# Patient Record
Sex: FEMALE | Race: WHITE | NOT HISPANIC OR LATINO | Employment: UNEMPLOYED | URBAN - METROPOLITAN AREA
[De-identification: names, ages, dates, MRNs, and addresses within clinical notes are randomized per-mention and may not be internally consistent; named-entity substitution may affect disease eponyms.]

---

## 2017-03-06 ENCOUNTER — TELEPHONE (OUTPATIENT)
Dept: NEUROLOGY | Facility: CLINIC | Age: 52
End: 2017-03-06

## 2017-03-06 DIAGNOSIS — R56.9 UNSPECIFIED CONVULSIONS (H): ICD-10-CM

## 2017-03-06 DIAGNOSIS — G40.109 LOCALIZATION-RELATED FOCAL EPILEPSY WITH SIMPLE PARTIAL SEIZURES (H): ICD-10-CM

## 2017-03-06 RX ORDER — LORAZEPAM 1 MG/1
1 TABLET ORAL 2 TIMES DAILY
Qty: 60 TABLET | Refills: 5 | Status: SHIPPED | OUTPATIENT
Start: 2017-03-06 | End: 2017-09-07

## 2017-03-06 RX ORDER — CARBAMAZEPINE 200 MG/1
CAPSULE, EXTENDED RELEASE ORAL
Qty: 240 CAPSULE | Refills: 5 | Status: SHIPPED | OUTPATIENT
Start: 2017-03-06 | End: 2017-06-07

## 2017-03-06 NOTE — TELEPHONE ENCOUNTER
Needs refills on   CARBATROL 200 MG 12 hr capsule 240 capsule 5 11/7/2016 Yes   Sig: Take 2 tabs (400mg) by mouth in the AM, 3 tabs (600mg) in the afternoon, and 3 tabs (600mg) in the PM.   Class: E-Prescribe     Disp Refills Start End PEYTON   LORazepam (ATIVAN) 1 MG tablet 60 tablet 5 11/7/2016 No   Sig: Take 1 tablet (1 mg) by mouth 2 times daily   Class: Local Print   Route: Oral   Order: [id number]     ALERT  Pt has u care insurance but [employer] drug coverage   Please send to the [facility] mail order  To see if covered   Pt is crying on the phone as she was told by the [employer] mail order she was not covered.    Could you help her with that?  Thanks.

## 2017-04-21 ENCOUNTER — OFFICE VISIT (OUTPATIENT)
Dept: NEUROLOGY | Facility: CLINIC | Age: 52
End: 2017-04-21

## 2017-04-21 VITALS
DIASTOLIC BLOOD PRESSURE: 98 MMHG | OXYGEN SATURATION: 98 % | RESPIRATION RATE: 20 BRPM | SYSTOLIC BLOOD PRESSURE: 154 MMHG | BODY MASS INDEX: 23.81 KG/M2 | HEART RATE: 96 BPM | WEIGHT: 142.9 LBS | HEIGHT: 65 IN

## 2017-04-21 DIAGNOSIS — G40.219 PARTIAL SYMPTOMATIC EPILEPSY WITH COMPLEX PARTIAL SEIZURES, INTRACTABLE, WITHOUT STATUS EPILEPTICUS (H): Primary | ICD-10-CM

## 2017-04-21 RX ORDER — DIPHENOXYLATE HYDROCHLORIDE AND ATROPINE SULFATE 2.5; .025 MG/1; MG/1
1 TABLET ORAL EVERY EVENING
COMMUNITY
Start: 2013-05-06 | End: 2017-06-30 | Stop reason: ALTCHOICE

## 2017-04-21 ASSESSMENT — PAIN SCALES - GENERAL: PAINLEVEL: NO PAIN (0)

## 2017-04-21 NOTE — MR AVS SNAPSHOT
After Visit Summary   4/21/2017    Mili Mane    MRN: [id number]           Patient Information     Date Of Birth          1965        Visit Information        Provider Department      4/21/2017 9:30 AM Barney Kendall MD [facility] Neurology        Today's Diagnoses     Partial symptomatic epilepsy with complex partial seizures, intractable, without status epilepticus (H)    -  1       Follow-ups after your visit        Additional Services     NEUROSURGERY REFERRAL       Your provider has referred you to: dr. SIERRA TO SE RE VNS BATTERY CHANGE  Please be aware that coverage of these services is subject to the terms and limitations of your health insurance plan.  Call member services at your health plan with any benefit or coverage questions.      Please bring the following with you to your appointment:    (1) Any X-Rays, CTs or MRIs which have been performed.  Contact the facility where they were done to arrange for  prior to your scheduled appointment.   (2) List of current medications  (3) This referral request   (4) Any documents/labs given to you for this referral                  Follow-up notes from your care team     Return in about 3 months (around 7/21/2017).      Who to contact     Please call your clinic at [phone] to:    Ask questions about your health    Make or cancel appointments    Discuss your medicines    Learn about your test results    Speak to your doctor   If you have compliments or concerns about an experience at your clinic, or if you wish to file a complaint, please contact [facility] Patient Relations at [phone] or email us at [email or web address]         Additional Information About Your Visit        MyChart Information     [employer] is an electronic gateway that provides easy, online access to your medical records. With [employer], you can request a clinic appointment, read your test results, renew a prescription or  "communicate with your care team.     To sign up for [employer] visit the website at [email or web address]   You will be asked to enter the access code listed below, as well as some personal information. Please follow the directions to create your username and password.     Your access code is: [email or web address]  Expires: 2017 10:38 AM     Your access code will  in 90 days. If you need help or a new code, please contact your [facility] or call [phone] for assistance.        Care EveryWhere ID     This is your Care EveryWhere ID. This could be used by other organizations to access your [facility] medical records  [id number]        Your Vitals Were     Pulse Respirations Height Pulse Oximetry Breastfeeding? BMI (Body Mass Index)    96 20 1.651 m (5' 5\") 98% No 23.78 kg/m2       Blood Pressure from Last 3 Encounters:   17 (!) 154/98   10/01/15 160/86   03/05/15 152/88    Weight from Last 3 Encounters:   17 64.8 kg (142 lb 14.4 oz)   10/01/15 66.2 kg (146 lb)   03/05/15 65.8 kg (145 lb)              We Performed the Following     NEUROSURGERY REFERRAL        Primary Care Provider Office Phone # Fax #    Prakash Rider [phone]       [facility] [address] [facility] [address]        Thank you!     Thank you for choosing [facility] NEUROLOGY  for your care. Our goal is always to provide you with excellent care. Hearing back from our patients is one way we can continue to improve our services. Please take a few minutes to complete the written survey that you may receive in the mail after your visit with us. Thank you!             Your Updated Medication List - Protect others around you: Learn how to safely use, store and throw away your medicines at www.disposemymeds.org.          This list is accurate as of: 17 10:13 AM.  Always use your most recent med list.                   Brand Name Dispense Instructions for use    " atenolol 25 MG tablet    TENORMIN    30 tablet    Take 0.5 tablets (12.5 mg) by mouth daily       CARBATROL 200 MG 12 hr capsule   Generic drug:  carBAMazepine     240 capsule    Take 2 tabs (400mg) by mouth in the AM, 3 tabs (600mg) in the afternoon, and 3 tabs (600mg) in the PM.       IBUPROFEN PO      Take 1-2 tablets by mouth as needed for moderate pain       LORazepam 1 MG tablet    ATIVAN    60 tablet    Take 1 tablet (1 mg) by mouth 2 times daily       MULTI-VITAMINS Tabs      Take 1 tablet by mouth daily

## 2017-04-21 NOTE — LETTER
2017       RE: Mili Mane  [address]  [facility] [address]     Dear Colleague,    Thank you for referring your patient, Mili Mane, to the [facility] NEUROLOGY at [facility]. Please see a copy of my visit note below.    Impedance  Ohms    2017       Prakash Rider MD    [address] [place] [address]       RE:    Mili Mane   MRN:  [id number]   :  1965      Dear Dr. Rider:      Ms. Mane is 51.  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  She has been on monotherapy with carbamazepine.  Recently you did a level for this and this was 11.  This has been similar before.  She is on the Carbatrol form at a schedule of 400 mg in the morning, 600 mg in the afternoon and 600 mg in the evening.        She is accompanied by her sister today as her mother has recently and suddenly passed away quickly from cancer.  She has been a little bit stressed out.  She has also noticed that she has had a little stronger seizures with some falling and she has had several per month.  In the past we tried multiple medications and this has been unsuccessful and she has developed a lot of side effects.  She has been seen at [facility].  She may have been on Topamax between those records.        Her sodiums have been borderline for a while, running 129 to 130, and this is undoubtedly related to the Carbatrol.  Her blood pressure is slightly elevated and we cautioned her to monitor that carefully with you.      On exam today, her pressures are 154/98, pulse is 96, respirations are 20 per minute.  Her eye movements do not show any nystagmus.  She is not dizzy.        In summary, the VNS battery is running out and is at very low levels.  She uses the magnet a fair amount of the time.  We would need to change her battery. It has been in only for 7 years, which is a little bit  unusual.  The usage of the magnet will be monitored in the future.      Her output current has been on the low side.  She has been able to tolerate more than 1.25 and she is cycling every 5 minutes, which should not burn the battery.      We need to rereview her drug, although she is not eager to do that and things may get fixed just with the VNS.  We will get [facility] records and see if she has had Topamax before.        We will follow her closely and ask her to see Neurosurgery for a battery change.      Sincerely,      Barney Kendall MD      D: 2017 10:39   T: 2017 11:12   MT: DAVIS      Name:     KELY JAMES   MRN:      [id number]        Account:      [id number]   :      1965           Service Date: 2017      Document: [id number]

## 2017-04-23 NOTE — PROGRESS NOTES
2017             Prakash Rider MD    [facility]   [address] [place] [address]       RE:    Mili Mane   MRN:  [id number]   :  1965      Dear Dr. Rider:      Ms. Mane is 51.  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  She has been on monotherapy with carbamazepine.  Recently you did a level for this and this was 11.  This has been similar before.  She is on the Carbatrol form at a schedule of 400 mg in the morning, 600 mg in the afternoon and 600 mg in the evening.        She is accompanied by her sister today as her mother has recently and suddenly passed away quickly from cancer.  She has been a little bit stressed out.  She has also noticed that she has had a little stronger seizures with some falling and she has had several per month.  In the past we tried multiple medications and this has been unsuccessful and she has developed a lot of side effects.  She has been seen at [facility].  She may have been on Topamax between those records.        Her sodiums have been borderline for a while, running 129 to 130, and this is undoubtedly related to the Carbatrol.  Her blood pressure is slightly elevated and we cautioned her to monitor that carefully with you.      On exam today, her pressures are 154/98, pulse is 96, respirations are 20 per minute.  Her eye movements do not show any nystagmus.  She is not dizzy.        In summary, the VNS battery is running out and is at very low levels.  She uses the magnet a fair amount of the time.  We would need to change her battery. It has been in only for 7 years, which is a little bit unusual.  The usage of the magnet will be monitored in the future.      Her output current has been on the low side.  She has been able to tolerate more than 1.25 and she is cycling every 5 minutes, which should not burn the battery.      We need to rereview her drug, although she is not eager to do that  and things may get fixed just with the VNS.  We will get [facility] records and see if she has had Topamax before.        We will follow her closely and ask her to see Neurosurgery for a battery change.      Sincerely,         MD ALDEN Carrera MD             D: 2017 10:39   T: 2017 11:12   MT: DAVIS      Name:     KELY JAMES   MRN:      [id number]        Account:      [id number]   :      1965           Service Date: 2017      Document: [id number]

## 2017-05-03 ENCOUNTER — TELEPHONE (OUTPATIENT)
Dept: NEUROLOGY | Facility: CLINIC | Age: 52
End: 2017-05-03

## 2017-05-03 NOTE — TELEPHONE ENCOUNTER
Spoke with patient who reports she is experiencing some mild chest and throat pain when it cycles through, does not experience pain every time is cycles but just this is occurring about once per day. The pain her throat began last night. Patient is also experiencing some nausea but unsure if this is correlated with her VNS cycling through. Per Dr. Kendall's last office visit:      In summary, the VNS battery is running out and is at very low levels. She uses the magnet a fair amount of the time. We would need to change her battery. It has been in only for 7 years, which is a little bit unusual. The usage of the magnet will be monitored in the future.       Her output current has been on the low side. She has been able to tolerate more than 1.25 and she is cycling every 5 minutes, which should not burn the battery.       We need to rereview her drug, although she is not eager to do that and things may get fixed just with the VNS. We will get [facility] records and see if she has had Topamax before.       We will follow her closely and ask her to see Neurosurgery for a battery change.       Plan:   RN explained how to turn generator off by holding magnet over it or taping it on her chest if the pain becomes hard to tolerate or worsen.   RN gave number to schedule appointment with Neurosurg to evaluate for battery change  RN explained that low battery could be cause for occasional pain, if pain worsens to call and schedule visit with RN to adjust settings or again encouraged to turn generator for periods of time if needed.     Patient verbalized agreement and understanding    No further needs at this time.

## 2017-05-03 NOTE — TELEPHONE ENCOUNTER
----- Message from Kajal Joshi LPN sent at 5/3/2017  3:59 PM CDT -----  Regarding: questions  Caller name: patient     Treating provider/specialty: Enoch    Nurse:    Best time to return call:    Message left? symptom - Vagus nerve stimulator is causing chest and throat pain 4/10, nausea since last week. Please address.    Description of issue/question:  Vagus nerve stimulator battery needs replacing-when is surgery date?

## 2017-05-12 DIAGNOSIS — Z96.89 STATUS POST VNS (VAGUS NERVE STIMULATOR) PLACEMENT: ICD-10-CM

## 2017-05-12 DIAGNOSIS — G40.219 PARTIAL SYMPTOMATIC EPILEPSY WITH COMPLEX PARTIAL SEIZURES, INTRACTABLE, WITHOUT STATUS EPILEPTICUS (H): Primary | ICD-10-CM

## 2017-05-16 ENCOUNTER — OFFICE VISIT (OUTPATIENT)
Dept: NEUROSURGERY | Facility: CLINIC | Age: 52
End: 2017-05-16

## 2017-05-16 VITALS
BODY MASS INDEX: 23.69 KG/M2 | WEIGHT: 142.2 LBS | HEIGHT: 65 IN | SYSTOLIC BLOOD PRESSURE: 162 MMHG | HEART RATE: 102 BPM | DIASTOLIC BLOOD PRESSURE: 86 MMHG

## 2017-05-16 DIAGNOSIS — Z87.898 H/O SHORTNESS OF BREATH: Primary | ICD-10-CM

## 2017-05-16 DIAGNOSIS — G40.211 PARTIAL SYMPTOMATIC EPILEPSY WITH COMPLEX PARTIAL SEIZURES, INTRACTABLE, WITH STATUS EPILEPTICUS (H): ICD-10-CM

## 2017-05-16 ASSESSMENT — PAIN SCALES - GENERAL: PAINLEVEL: NO PAIN (0)

## 2017-05-16 NOTE — LETTER
2017       RE: Mili James  [address]  [place] [address]     Dear Colleague,    Thank you for referring your patient, Mili James, to the [facility] NEUROSURGERY at [facility]. Please see a copy of my visit note below.    May 16, 2017      Barney Kendall M.D.      RE:  Mili Alhaji      Dear Barney:      I had the opportunity to see Ms. James today in my clinic.  As you know, she is a 52-year-old woman with intractable epilepsy.  She has had a VNS placed by Dr. Hdz in the early s.  She has the battery replaced by Dr. Retana here at the [place] more recently.  Since then she has been doing fairly well.  However, on interrogation, her battery is running low.  According to you, she does use her magnet a lot.      On examination, she has anterior axillary incision that appears well-healed.  She has a neck incision which is also well-healed.  The device is easily palpable and is extending anteriorly from her incision.        I spent approximately 30 minutes talking to her about the procedure.  She is quite versed on this given that she has had Dr. Retana operate on her before.  However, she does complain of some shortness of breath following her second operation.  For that reason, I think she will need to have good preoperative evaluation.  Once this is done, we will be glad to replace her VNS.  We will attempt to put in the newest battery with the most longevity for her.         IGOR SIERRA MD             D: 2017 15:17   T: 2017 07:36   MT: CARLO      Name:     MILI JAMES   MRN:      [id number]        Account:      [id number]   :      1965           Service Date: 2017      Document: [id number]

## 2017-05-16 NOTE — NURSING NOTE
Chief Complaint   Patient presents with     Consult     [facility] NEW - VNS Battery change     Marilynn Mckinley MA

## 2017-05-16 NOTE — MR AVS SNAPSHOT
After Visit Summary   5/16/2017    Mili Mane    MRN: [id number]           Patient Information     Date Of Birth          1965        Visit Information        Provider Department      5/16/2017 2:45 PM Eliseo Nolen MD [facility] Neurosurgery        Today's Diagnoses     H/O shortness of breath    -  1    Partial symptomatic epilepsy with complex partial seizures, intractable, with status epilepticus (H)           Follow-ups after your visit        Additional Services     PAC Visit Referral (For [facility] Only)       Does this visit require an Anesthesia consult?  Yes - Evaluate for medical necessity related to one of the following conditions:  H/o SOB postoperatively    H&P done by:  N/A-PAC to complete      Please be aware that coverage of these services is subject to the terms and limitations of your health insurance plan.  Call member services at your health plan with any benefit or coverage questions.      Please bring the following to your appointment:  >>   Any x-rays, CTs or MRIs which have been performed.  Contact the facility where they were done to arrange for  prior to your scheduled appointment.  Any new CT, MRI or other procedures ordered by your specialist must be performed at a [facility] facility or coordinated by your clinic's referral office.    >>   List of current medications  >>   This referral request   >>   Any documents/labs given to you for this referral                  Your next 10 appointments already scheduled     Jun 06, 2017 10:00 AM CDT   (Arrive by 9:45 AM)   PAC EVALUATION with Cinda Pac Jenny 7   [facility] ([facility] [place])    [address] [place] [address]  4th Floor  [facility] [address]   [phone]            Jun 06, 2017 11:00 AM CDT   (Arrive by 10:45 AM)   PAC RN ASSESSMENT with Cinda Pac Rn   [facility] ([facility] [place])    [address] [place] [address]  [facility] [address]   [phone]            2017 11:20 AM CDT   (Arrive by 11:05 AM)   PAC Anesthesia Consult with  Pac Anesthesiologist   [facility] ([facility])    [address] [place] [address]  4th [facility] [address] [phone]            2017   Procedure with Eliseo Nolen MD   [facility] [place] Same Day Surgery (--)    [address] [place] [address]   [phone]            2017 11:30 AM CDT   (Arrive by 11:15 AM)   Return Seizure with Barney Kendall MD   [facility] Neurology ([facility])    [address] [place] [address]  3rd [facility] [id number] [phone]              Who to contact     Please call your clinic at [phone] to:    Ask questions about your health    Make or cancel appointments    Discuss your medicines    Learn about your test results    Speak to your doctor   If you have compliments or concerns about an experience at your clinic, or if you wish to file a complaint, please contact [facility] Patient Relations at [phone] or email us at [email or web address]         Additional Information About Your Visit        [employer] Information     [employer] is an electronic gateway that provides easy, online access to your medical records. With [employer], you can request a clinic appointment, read your test results, renew a prescription or communicate with your care team.     To sign up for [employer] visit the website at [email or web address]   You will be asked to enter the access code listed below, as well as some personal information. Please follow the directions to create your username and password.     Your access code is: [email or web address]  Expires: 2017 10:38 AM     Your access code will  in 90 days. If you need help or a new code, please contact your [facility] or call  "[phone] for assistance.        Care EveryWhere ID     This is your Care EveryWhere ID. This could be used by other organizations to access your [facility] medical records  [id number]        Your Vitals Were     Pulse Height BMI (Body Mass Index)             102 1.651 m (5' 5\") 23.66 kg/m2          Blood Pressure from Last 3 Encounters:   05/16/17 162/86   04/21/17 (!) 154/98   10/01/15 160/86    Weight from Last 3 Encounters:   05/16/17 64.5 kg (142 lb 3.2 oz)   04/21/17 64.8 kg (142 lb 14.4 oz)   10/01/15 66.2 kg (146 lb)              We Performed the Following     PAC Visit Referral (For [facility] Only)     Lila-Operative Worksheet        Primary Care Provider Office Phone # Fax #    Prakash Rider [phone]       [address] [facility] [address]        Thank you!     Thank you for choosing [facility] NEUROSURGERY  for your care. Our goal is always to provide you with excellent care. Hearing back from our patients is one way we can continue to improve our services. Please take a few minutes to complete the written survey that you may receive in the mail after your visit with us. Thank you!             Your Updated Medication List - Protect others around you: Learn how to safely use, store and throw away your medicines at www.disposemymeds.org.          This list is accurate as of: 5/16/17 11:59 PM.  Always use your most recent med list.                   Brand Name Dispense Instructions for use    atenolol 25 MG tablet    TENORMIN    30 tablet    Take 0.5 tablets (12.5 mg) by mouth daily       CARBATROL 200 MG 12 hr capsule   Generic drug:  carBAMazepine     240 capsule    Take 2 tabs (400mg) by mouth in the AM, 3 tabs (600mg) in the afternoon, and 3 tabs (600mg) in the PM.       IBUPROFEN PO      Take 1-2 tablets by mouth as needed for moderate pain       LORazepam 1 MG tablet    ATIVAN    60 tablet    Take 1 tablet (1 mg) by mouth 2 times daily       " MULTI-VITAMINS Tabs      Take 1 tablet by mouth daily

## 2017-05-17 NOTE — PROGRESS NOTES
May 16, 2017      Barney Kendall M.D.      RE:  Mili James      Dear Barney:      I had the opportunity to see Ms. James today in my clinic.  As you know, she is a 52-year-old woman with intractable epilepsy.  She has had a VNS placed by Dr. Hdz in the early s.  She has the battery replaced by Dr. Retana here at the [facility] more recently.  Since then she has been doing fairly well.  However, on interrogation, her battery is running low.  According to you, she does use her magnet a lot.      On examination, she has anterior axillary incision that appears well-healed.  She has a neck incision which is also well-healed.  The device is easily palpable and is extending anteriorly from her incision.        I spent approximately 30 minutes talking to her about the procedure.  She is quite versed on this given that she has had Dr. Retana operate on her before.  However, she does complain of some shortness of breath following her second operation.  For that reason, I think she will need to have good preoperative evaluation.  Once this is done, we will be glad to replace her VNS.  We will attempt to put in the newest battery with the most longevity for her.         IGOR SIERRA MD             D: 2017 15:17   T: 2017 07:36   MT: CARLO      Name:     MILI JAMES   MRN:      [id number]        Account:      [id number]   :      1965           Service Date: 2017      Document: [id number]

## 2017-05-18 NOTE — NURSING NOTE
Pre-op Teaching                Pre-op folder with specific written instructions was provided to Mili.     Discussed pre-op routine and requirements to include:  surgical procedure, post-op recovery and expectations, need for H&P, NPO prior to OR, pre-op antibacterial showers, pain control and importance of follow-up visits.  Surgery scheduling will coordinate OR time/date and update patient as appropriate.  Pre-op will call with more instructions 24-48 hour pre-op.   Ample time was provided for questions and in-depth discussion of topics of heightened interest.  A four ounce bottle of antibacterial soap solution was given to patient as well as specific instructions for use. Mili and  verbalized understanding of instructions.

## 2017-06-06 ENCOUNTER — ALLIED HEALTH/NURSE VISIT (OUTPATIENT)
Dept: SURGERY | Facility: CLINIC | Age: 52
End: 2017-06-06

## 2017-06-06 ENCOUNTER — ANESTHESIA EVENT (OUTPATIENT)
Dept: SURGERY | Facility: CLINIC | Age: 52
End: 2017-06-06
Payer: COMMERCIAL

## 2017-06-06 ENCOUNTER — OFFICE VISIT (OUTPATIENT)
Dept: SURGERY | Facility: CLINIC | Age: 52
End: 2017-06-06

## 2017-06-06 VITALS
OXYGEN SATURATION: 100 % | DIASTOLIC BLOOD PRESSURE: 108 MMHG | RESPIRATION RATE: 16 BRPM | TEMPERATURE: 97.5 F | HEIGHT: 65 IN | SYSTOLIC BLOOD PRESSURE: 160 MMHG | BODY MASS INDEX: 23.09 KG/M2 | HEART RATE: 86 BPM | WEIGHT: 138.6 LBS

## 2017-06-06 DIAGNOSIS — Z01.818 PREOP GENERAL PHYSICAL EXAM: ICD-10-CM

## 2017-06-06 DIAGNOSIS — Z01.818 PREOP GENERAL PHYSICAL EXAM: Primary | ICD-10-CM

## 2017-06-06 LAB
ANION GAP SERPL CALCULATED.3IONS-SCNC: 6 MMOL/L (ref 3–14)
BUN SERPL-MCNC: 10 MG/DL (ref 7–30)
CALCIUM SERPL-MCNC: 8.7 MG/DL (ref 8.5–10.1)
CHLORIDE SERPL-SCNC: 99 MMOL/L (ref 94–109)
CO2 SERPL-SCNC: 27 MMOL/L (ref 20–32)
CREAT SERPL-MCNC: 0.51 MG/DL (ref 0.52–1.04)
ERYTHROCYTE [DISTWIDTH] IN BLOOD BY AUTOMATED COUNT: 12.6 % (ref 10–15)
GFR SERPL CREATININE-BSD FRML MDRD: ABNORMAL ML/MIN/1.7M2
GLUCOSE SERPL-MCNC: 99 MG/DL (ref 70–99)
HCT VFR BLD AUTO: 37.5 % (ref 35–47)
HGB BLD-MCNC: 12.5 G/DL (ref 11.7–15.7)
MCH RBC QN AUTO: 30 PG (ref 26.5–33)
MCHC RBC AUTO-ENTMCNC: 33.3 G/DL (ref 31.5–36.5)
MCV RBC AUTO: 90 FL (ref 78–100)
PLATELET # BLD AUTO: 329 10E9/L (ref 150–450)
POTASSIUM SERPL-SCNC: 4.7 MMOL/L (ref 3.4–5.3)
RBC # BLD AUTO: 4.17 10E12/L (ref 3.8–5.2)
SODIUM SERPL-SCNC: 133 MMOL/L (ref 133–144)
WBC # BLD AUTO: 4.7 10E9/L (ref 4–11)

## 2017-06-06 ASSESSMENT — ENCOUNTER SYMPTOMS: SEIZURES: 1

## 2017-06-06 NOTE — PATIENT INSTRUCTIONS
Preparing for Your Surgery      Name:  Mili Mane   MRN:  [id number]   :  1965   Today's Date:  2017     Arriving for surgery:  Surgery date:  17  Surgery time:  8:15 am  Arrival time:  6:15 am  Please come to:       [facility] [address]  [address] [place]  [address]    -   parking is available in front of the hospital from 5:15 am to 8:00 pm    -  Stop at the Information Desk in the lobby    -   Inform the information person that you are here for surgery. An escort to 3c will be provided. If you would not like an escort, please proceed to 3C on the 3rd floor. [phone]     What can I eat or drink?  -  You may have solid food or milk products until 8 hours prior to your surgery. (12 midnight)  -  You may have water, apple juice or 7up/Sprite until 2 hours prior to your surgery. (6:15 am)    Which medicines can I take?  -  Do NOT take vitamins for one week before surgery.  Hold Ibuprofen for 2-3 days before surgery.  -  Please take these medications the day of surgery: Atenolol, Carbatrol  -  OK to take Ativan if needed    How do I prepare myself?  -  Take two showers: one the night before surgery; and one the morning of surgery.         Use Scrubcare or Hibiclens to wash from neck down.  You may use your own shampoo and conditioner. No other hair products.   -  Do NOT use lotion, powder, deodorant, or antiperspirant the day of your surgery.  -  Do NOT wear any makeup, fingernail polish or jewelry.  -  Do not bring your own medications to the hospital, except for inhalers and eye drops.  -  Bring your ID and insurance card.    Questions or Concerns:  If you have questions or concerns, please call the  Preoperative Assessment Center, Monday-Friday 7AM-7PM:  [phone]            AFTER YOUR SURGERY  Breathing exercises   Breathing exercises help you recover faster. Take deep breaths and let the air out slowly. This will:     Help you wake  up after surgery.    Help prevent complications like pneumonia.  Preventing complications will help you go home sooner.   We may give you a breathing device (incentive spirometer) to encourage you to breathe deeply.   Nausea and vomiting   You may feel sick to your stomach after surgery; if so, let your nurse know.    Pain control:  After surgery, you may have pain. Our goal is to help you manage your pain. Pain medicine will help you feel comfortable enough to do activities that will help you heal.  These activities may include breathing exercises, walking and physical therapy.   To help your health care team treat your pain we will ask: 1) If you have pain  2) where it is located 3) describe your pain in your words  Methods of pain control include medications given by mouth, vein or by nerve block for some surgeries.  We may give you a pain control pump that will:  1) Deliver the medicine through a tube placed in your vein  2) Control the amount of medicine you receive  3) Allow you to push a button to deliver a dose of pain medicine  Sequential Compression Device (SCD) or Pneumo Boots:  You may need to wear SCD S on your legs or feet. These are wraps connected to a machine that pumps in air and releases it. The repeated pumping helps prevent blood clots from forming.

## 2017-06-06 NOTE — MR AVS SNAPSHOT
After Visit Summary   2017    Mili Mane    MRN: [id number]           Patient Information     Date Of Birth          1965        Visit Information        Provider Department      2017 11:00 AM Rn, [facility]        Care Instructions    Preparing for Your Surgery      Name:  Mili Mane   MRN:  [id number]   :  1965   Today's Date:  2017     Arriving for surgery:  Surgery date:  17  Surgery time:  8:15 am  Arrival time:  6:15 am  Please come to:       [facility] [address]  [address] [place]  [address]    -   parking is available in front of the hospital from 5:15 am to 8:00 pm    -  Stop at the Information Desk in the lobby    -   Inform the information person that you are here for surgery. An escort to 3c will be provided. If you would not like an escort, please proceed to 3C on the 3rd floor. [phone]     What can I eat or drink?  -  You may have solid food or milk products until 8 hours prior to your surgery. (12 midnight)  -  You may have water, apple juice or 7up/Sprite until 2 hours prior to your surgery. (6:15 am)    Which medicines can I take?  -  Do NOT take vitamins for one week before surgery.  Hold Ibuprofen for 2-3 days before surgery.  -  Please take these medications the day of surgery: Atenolol, Carbatrol  -  OK to take Ativan if needed    How do I prepare myself?  -  Take two showers: one the night before surgery; and one the morning of surgery.         Use Scrubcare or Hibiclens to wash from neck down.  You may use your own shampoo and conditioner. No other hair products.   -  Do NOT use lotion, powder, deodorant, or antiperspirant the day of your surgery.  -  Do NOT wear any makeup, fingernail polish or jewelry.  -  Do not bring your own medications to the hospital, except for inhalers and eye drops.  -  Bring your ID and insurance card.    Questions  or Concerns:  If you have questions or concerns, please call the  Preoperative Assessment Center, Monday-Friday 7AM-7PM:  [phone]            AFTER YOUR SURGERY  Breathing exercises   Breathing exercises help you recover faster. Take deep breaths and let the air out slowly. This will:     Help you wake up after surgery.    Help prevent complications like pneumonia.  Preventing complications will help you go home sooner.   We may give you a breathing device (incentive spirometer) to encourage you to breathe deeply.   Nausea and vomiting   You may feel sick to your stomach after surgery; if so, let your nurse know.    Pain control:  After surgery, you may have pain. Our goal is to help you manage your pain. Pain medicine will help you feel comfortable enough to do activities that will help you heal.  These activities may include breathing exercises, walking and physical therapy.   To help your health care team treat your pain we will ask: 1) If you have pain  2) where it is located 3) describe your pain in your words  Methods of pain control include medications given by mouth, vein or by nerve block for some surgeries.  We may give you a pain control pump that will:  1) Deliver the medicine through a tube placed in your vein  2) Control the amount of medicine you receive  3) Allow you to push a button to deliver a dose of pain medicine  Sequential Compression Device (SCD) or Pneumo Boots:  You may need to wear SCD S on your legs or feet. These are wraps connected to a machine that pumps in air and releases it. The repeated pumping helps prevent blood clots from forming.           Follow-ups after your visit        Your next 10 appointments already scheduled     Jun 06, 2017 11:00 AM CDT   (Arrive by 10:45 AM)   PAC RN ASSESSMENT with Cinda Pac Rn   [facility] ([facility])    [address]  [address] [facility] [address] [phone]            Jun  06, 2017 11:20 AM CDT   (Arrive by 11:05 AM)   PAC Anesthesia Consult with  Pac Anesthesiologist   [facility] Preoperative [facility] ([facility])    [address]  [address] [facility] [id number] [phone]            Jun 06, 2017 11:30 AM CDT   LAB with  LAB   [facility] Lab ([facility])    [address] [place] [address]  [address] [facility] [id number] [phone]           Patient must bring picture ID.  Patient should be prepared to give a urine specimen  Please do not eat 10-12 hours before your appointment if you are coming in fasting for labs on lipids, cholesterol, or glucose (sugar).  Pregnant women should follow their Care Team instructions. Water with medications is okay. Do not drink coffee or other fluids.   If you have concerns about taking  your medications, please ask at office or if scheduling via [employer], send a message by clicking on Secure Messaging, Message Your Care Team.            Jun 20, 2017   Procedure with Eliseo Nolen MD   [facility] [place], Same Day Surgery (--)    [address] [id number] [phone]            Jul 07, 2017 11:30 AM CDT   (Arrive by 11:15 AM)   Return Seizure with Barney Kendall MD   [facility] Neurology ([facility])    [address] [place] [address]  [address] [facility] [id number] [phone]              Who to contact     Please call your clinic at [phone] to:    Ask questions about your health    Make or cancel appointments    Discuss your medicines    Learn about your test results    Speak to your doctor   If you have compliments or concerns about an experience at your clinic, or if you wish to file a complaint, please contact [facility] Patient Relations at [phone] or email us at [email or web address]         Additional Information About Your Visit        nxtControlhart Information     [employer] is an electronic  gateway that provides easy, online access to your medical records. With [employer], you can request a clinic appointment, read your test results, renew a prescription or communicate with your care team.     To sign up for [employer] visit the website at [email or web address]   You will be asked to enter the access code listed below, as well as some personal information. Please follow the directions to create your username and password.     Your access code is: [email or web address]  Expires: 2017 10:38 AM     Your access code will  in 90 days. If you need help or a new code, please contact your [facility] or call [phone] for assistance.        Care EveryWhere ID     This is your Care EveryWhere ID. This could be used by other organizations to access your [facility] medical records  [id number]         Blood Pressure from Last 3 Encounters:   17 (!) 160/108   17 162/86   17 (!) 154/98    Weight from Last 3 Encounters:   17 62.9 kg (138 lb 9.6 oz)   17 64.5 kg (142 lb 3.2 oz)   17 64.8 kg (142 lb 14.4 oz)              Today, you had the following     No orders found for display         Today's Medication Changes          These changes are accurate as of: 17 10:26 AM.  If you have any questions, ask your nurse or doctor.               These medicines have changed or have updated prescriptions.        Dose/Directions    atenolol 25 MG tablet   Commonly known as:  TENORMIN   This may have changed:  when to take this   Used for:  Essential hypertension, benign, Partial symptomatic epilepsy with complex partial seizures, not intractable, without status epilepticus (H)        Dose:  12.5 mg   Take 0.5 tablets (12.5 mg) by mouth daily   Quantity:  30 tablet   Refills:  0                Primary Care Provider Office Phone # Fax #    Prakash Rider [phone]       [facility] [address] [address]         Thank you!     Thank you for choosing [facility]  for your care. Our goal is always to provide you with excellent care. Hearing back from our patients is one way we can continue to improve our services. Please take a few minutes to complete the written survey that you may receive in the mail after your visit with us. Thank you!             Your Updated Medication List - Protect others around you: Learn how to safely use, store and throw away your medicines at www.disposemymeds.org.          This list is accurate as of: 6/6/17 10:26 AM.  Always use your most recent med list.                   Brand Name Dispense Instructions for use    atenolol 25 MG tablet    TENORMIN    30 tablet    Take 0.5 tablets (12.5 mg) by mouth daily       CARBATROL 200 MG 12 hr capsule   Generic drug:  carBAMazepine     240 capsule    Take 2 tabs (400mg) by mouth in the AM, 3 tabs (600mg) in the afternoon, and 3 tabs (600mg) in the PM.       IBUPROFEN PO      Take 1-2 tablets by mouth as needed for moderate pain       LORazepam 1 MG tablet    ATIVAN    60 tablet    Take 1 tablet (1 mg) by mouth 2 times daily       MULTI-VITAMINS Tabs      Take 1 tablet by mouth every evening

## 2017-06-06 NOTE — H&P
Pre-Operative H & P     CC:  Preoperative exam to assess for increased cardiopulmonary risk while undergoing surgery and anesthesia.    Date of Encounter: 6/6/2017  Primary Care Physician:  Prakash Rider TONYA Mane is a 52 year old female who presents for pre-operative H & P in preparation for Vagus Nerve Stimulator Replacement with Dr. Nolen on 6/20/2017 at [facility].     History is obtained from the patient.     Past Medical History  Past Medical History:   Diagnosis Date     Adjustment disorder with depressed mood      Epileptic seizure (H)      HTN (hypertension)        Past Surgical History  Past Surgical History:   Procedure Laterality Date     vnp         Hx of Blood transfusions/reactions: none    Hx of abnormal bleeding or anti-platelet use: none    Menstrual history: Patient's last menstrual period was 05/06/2017 (exact date).:     Steroid use in the last year: none    Personal or FH with difficulty with Anesthesia:  None        Prior to Admission Medications  Current Outpatient Prescriptions   Medication Sig Dispense Refill     Multiple Vitamin (MULTI-VITAMINS) TABS Take 1 tablet by mouth every evening        LORazepam (ATIVAN) 1 MG tablet Take 1 tablet (1 mg) by mouth 2 times daily 60 tablet 5     atenolol (TENORMIN) 25 MG tablet Take 1 tablet (25 mg) by mouth every morning       CARBATROL 200 MG 12 hr capsule Take 2 tabs (400mg) by mouth in the AM, 3 tabs (600mg) in the afternoon, and 3 tabs (600mg) in the PM. 240 capsule 1     IBUPROFEN PO Take 1-2 tablets by mouth as needed for moderate pain         Allergies  Allergies   Allergen Reactions     Fish Allergy      Nuts      Other [Seasonal Allergies]      Seizure medication     Penicillins      Pollen Extract/Tree Extract        Social History  Social History     Social History     Marital status: Single     Spouse name: N/A     Number of children: N/A     Years of education: N/A      Occupational History     Not on file.     Social History Main Topics     Smoking status: Never Smoker     Smokeless tobacco: Never Used     Alcohol use No     Drug use: No     Sexual activity: Not on file     Other Topics Concern      Service No     Blood Transfusions No     Caffeine Concern Yes     Occupational Exposure No     Hobby Hazards No     Weight Concern No     Special Diet No     Back Care No     Exercise Yes     inconsistent.  Walks her dog occasionally     Seat Belt Yes     Self-Exams No     Social History Narrative    Lives with her parents.        Family History  Family History   Problem Relation Age of Onset     Hypertension Mother      Lipids Mother      Cardiovascular Maternal Uncle      Cardiovascular Maternal Uncle      Cardiovascular Paternal Grandfather      CANCER Maternal Grandmother      gastric     Asthma Sister              Anesthesia Evaluation     . Pt has had prior anesthetic. Type: General and MAC           ROS/MED HX    ENT/Pulmonary:  - neg pulmonary ROS     Neurologic:     (+)seizures last seizure: 6/1/2017 features: weekly seizures, mostly absence seizures,     Cardiovascular:     (+) hypertension----. : . . . :. . Previous cardiac testing Echodate:results:date: results:ECG reviewed date:6/6/2017 results: date: results:          METS/Exercise Tolerance:  3 - Able to walk 1-2 blocks without stopping   Hematologic:  - neg hematologic  ROS       Musculoskeletal:  - neg musculoskeletal ROS       GI/Hepatic:  - neg GI/hepatic ROS       Renal/Genitourinary:  - ROS Renal section negative       Endo:  - neg endo ROS       Psychiatric:     (+) psychiatric history anxiety      Infectious Disease:  - neg infectious disease ROS       Malignancy:      - no malignancy   Other:    (+) No chance of pregnancy C-spine cleared: N/A, no H/O Chronic Pain,no other significant disability   - neg other ROS           Physical Exam  Normal systems: cardiovascular, pulmonary and dental    Airway  "  Mallampati: II  Neck ROM: full    Dental   Normal    Cardiovascular   Rhythm and rate: regular and normal  Positive murmur    Pulmonary    breath sounds clear to auscultation            The complete review of systems is negative other than noted in the HPI or here.                        138 lbs 9.6 oz  5' 5\"   Body mass index is 23.06 kg/(m^2).       Physical Exam  Constitutional: Awake, alert, cooperative, no apparent distress, and appears stated age.  Eyes: Pupils equal, round and reactive to light, extra ocular muscles intact, sclera clear, conjunctiva normal.  HENT: Normocephalic, oral pharynx with moist mucus membranes, good dentition. No goiter appreciated.   Respiratory: Clear to auscultation bilaterally, no crackles or wheezing.  Cardiovascular: Regular rate and rhythm, normal S1 and S2, and no murmur noted.  Carotids +2, no bruits. No edema. Palpable pulses to radial  DP and PT arteries.   GI: Normal bowel sounds, soft, non-distended, non-tender, no masses palpated, no hepatosplenomegaly.    Lymph/Hematologic: No cervical lymphadenopathy and no supraclavicular lymphadenopathy.  Genitourinary:  deferred  Skin: Warm and dry.  No rashes at anticipated surgical site.   Musculoskeletal: Full ROM of neck. There is no redness, warmth, or swelling of the joints. Gross motor strength is normal.    Neurologic: Awake, alert, oriented to name, place and time. Cranial nerves II-XII are grossly intact. Gait is normal.   Neuropsychiatric: Calm, cooperative. Normal affect.     Labs: (personally reviewed)  Lab Results   Component Value Date    WBC 4.7 06/06/2017     Lab Results   Component Value Date    RBC 4.17 06/06/2017     Lab Results   Component Value Date    HGB 12.5 06/06/2017     Lab Results   Component Value Date    HCT 37.5 06/06/2017     No components found for: MCT  Lab Results   Component Value Date    MCV 90 06/06/2017     Lab Results   Component Value Date    MCH 30.0 06/06/2017     Lab Results   Component " Value Date    MCHC 33.3 2017     Lab Results   Component Value Date    RDW 12.6 2017     Lab Results   Component Value Date     2017       Last Basic Metabolic Panel:  Lab Results   Component Value Date     2017      Lab Results   Component Value Date    POTASSIUM 4.7 2017     Lab Results   Component Value Date    CHLORIDE 99 2017     Lab Results   Component Value Date    RANDY 8.7 2017     Lab Results   Component Value Date    CO2 27 2017     Lab Results   Component Value Date    BUN 10 2017     Lab Results   Component Value Date    CR 0.51 2017     Lab Results   Component Value Date    GLC 99 2017       EKG: Personally reviewed but formal cardiology read pendin2017 NSR, possible left atrial enlargement, left axis deviation and LBBB      Cardiac echo: 2014  Final Conclusion   Normal left ventricular size.Left ventricular ejection fraction is estimated at 40-45%.   Mildly hypokinetic anteroseptum and septum.   Normal left ventricular wall thickness.   Aortic valve sclerosis.Mild aortic regurgitation.   Mitral annular calcification.Mild-to-moderate mitral regurgitation.   Mild tricuspid regurgitation.   Estimated EF: 40-45%     FINDINGS   Left Ventricle Normal left ventricular size. Left ventricular ejection fraction is estimated   at 40-45%. Mildly hypokinetic   anteroseptum and septum. Normal left ventricular wall thickness.   LV Diastology Normal left ventricular diastolic filling pattern for age.   Right Ventricle The right ventricle is normal in size and function.   Left Atrium The left atrium is normal in size.   Right Atrium The right atrium is normal in size.   IA Septum: No left to right shunt was detected by limited color flow Doppler interrogation of   the interatrial septum.   IVC Normal inferior vena cava (IVC) size.   Aortic Valve Aortic valve sclerosis. Mild aortic regurgitation.   Mitral Valve Mitral annular  calcification. Mild-to-moderate mitral regurgitation.   Tricuspid Valve Mild tricuspid regurgitation.mild tricuspid regurgitation. Cannot estimate PA   pressures on this study.   Pulmonic Valve Structurally normal pulmonic valve without significant stenosis.  There is no   significant pulmonic regurgitation.   Aorta The sinuses of Valsalva, sinotubular junction, and ascending aorta appear normal.   Pericardium Normal pericardium without effusion.    Echocardiogram 6/12/2017    Interpretation Summary  Left ventricular size is normal.  The Ejection Fraction is estimated at 30-35%.  Right ventricular function, chamber size, wall motion, and thickness are  normal.  The inferior vena cava is normal.  No pericardial effusion is present.  _____________________________________________________________________________  __        Left Ventricle  Left ventricular size is normal. Left ventricular wall thickness is normal.  The Ejection Fraction is estimated at 30-35%. Normal left ventricular filling  for age. Septal wall akinesis is present.     Right Ventricle  Right ventricular function, chamber size, wall motion, and thickness are  normal.     Atria  Both atria appear normal. The atrial septum is intact as assessed by color  Doppler .     Mitral Valve  The mitral valve is normal. Mild mitral insufficiency is present.        Aortic Valve  The aortic valve is tricuspid. Trace aortic insufficiency is present.     Tricuspid Valve  The tricuspid valve is normal. Trace to mild tricuspid insufficiency is  present. The right ventricular systolic pressure is approximated at 22.0 mmHg  plus the right atrial pressure.     Pulmonic Valve  The pulmonic valve is normal. Trace pulmonic insufficiency is present.     Vessels  The aorta root is normal. The pulmonary artery is normal. The inferior vena  cava is normal.     Pericardium  No pericardial effusion is  present.     _____________________________________________________________________________  __  MMode/2D Measurements & Calculations  IVSd: 0.81 cm  LVIDd: 4.9 cm  LVIDs: 3.8 cm  LVPWd: 0.79 cm  FS: 23.2 %  EDV(Teich): 114.1 ml  ESV(Teich): 61.3 ml     LV mass(C)d: 131.9 grams  LV mass(C)dI: 78.1 grams/m2  Ao root diam: 2.6 cm  asc Aorta Diam: 2.6 cm  LVOT diam: 1.9 cm  LVOT area: 2.9 cm2  LA Volume (BP): 53.7 ml  LA Volume Index (BP): 31.8 ml/m2        Doppler Measurements & Calculations  MV E max helen: 103.3 cm/sec  MV A max helen: 55.7 cm/sec  MV E/A: 1.9  MV dec time: 0.17 sec     Ao V2 max: 132.9 cm/sec  Ao max P.0 mmHg  TR max helen: 234.6 cm/sec  TR max P.0 mmHg  Lateral E/e': 11.4  Medial E/e': 12.8        ASSESSMENT and PLAN  Mili Mane is a 52 year old female scheduled to undergo Vagus Nerve Stimulator Replacement. She has the following specific operative considerations:   - RCRI : Unknown cause for low EF, currently being worked up by cardiology  - Anesthesia considerations:  Refer to PAC assessment in anesthesia records  - VTE risk: low risk  - YOON # of risks  = low risk  - Post-op delirium risk: low risk  - Risk of PONV score = 2.  If > 2, anti-emetic intervention recommended.      Previous anesthesia without complications.  1) Cardiac: Echo done in  at [facility] shows EF 40-45% and mild hypokinesis, no follow-up. EKG today NSR, possible left atrial enlargement, left axis deviation and LBBB. Pt referred to cardiology and is scheduled 6/10/2017. HTN, on atenolol at 12.5 mg with elevated B/Ps. Encouraged to F/U with PCP for better control.  2) Pulmonary: Never smoked. Notes intermittent shortness of breath.  3) Neuro: seizure disorder diagnosed at 18 months. Current regimen of carbatrol and VNS. Interventions have helped, but pt still experiences seizures about once a week.      Independent with all activity, but limited to home due to seizure frequency    PT SEEN BY CARDIOLOGY, due to  "reduced EF and cardiomyopathy. Discussed with Dr. Nolen and surgery pending clearance by Dr. Calvert.    Patient was discussed with Dr Germain.    MALCOLM Diggs CNS  Preoperative Assessment Center  [facility]  [facility]  Phone: [phone]  Fax: [phone]    Mili Mane is a 52 year old female patient born on 1965.  1. Preop general physical exam      Past Medical History:   Diagnosis Date     Adjustment disorder with depressed mood      Epileptic seizure (H)      HTN (hypertension)      Allergies   Allergen Reactions     Fish Allergy      Nuts      Other [Seasonal Allergies]      Seizure medication     Penicillins      Pollen Extract/Tree Extract      Active Problems:    * No active hospital problems. *    Blood pressure (!) 160/108, pulse 86, temperature 97.5  F (36.4  C), temperature source Oral, resp. rate 16, height 1.651 m (5' 5\"), weight 62.9 kg (138 lb 9.6 oz), last menstrual period 05/06/2017, SpO2 100 %, not currently breastfeeding.    NICU Admission  Maternal Medical History  Assessment & Plan    Uriel Barron  6/19/2017    "

## 2017-06-06 NOTE — ANESTHESIA PREPROCEDURE EVALUATION
Anesthesia Evaluation     . Pt has had prior anesthetic. Type: General and MAC           ROS/MED HX    ENT/Pulmonary:  - neg pulmonary ROS     Neurologic:     (+)seizures last seizure: 6/1/2017 features: weekly seizures, mostly absence seizures,     Cardiovascular:     (+) hypertension----. : . CHF . PRINCE, . :. . Previous cardiac testing Echodate:results:EF 30-35%, MR, ARdate: results:ECG reviewed date:6/6/2017 results:NSR, LBBB, left axis deviation date: results:          METS/Exercise Tolerance:  3 - Able to walk 1-2 blocks without stopping   Hematologic:  - neg hematologic  ROS       Musculoskeletal:  - neg musculoskeletal ROS       GI/Hepatic:  - neg GI/hepatic ROS       Renal/Genitourinary:  - ROS Renal section negative       Endo:  - neg endo ROS       Psychiatric:     (+) psychiatric history anxiety      Infectious Disease:  - neg infectious disease ROS       Malignancy:      - no malignancy   Other:    (+) No chance of pregnancy C-spine cleared: N/A, no H/O Chronic Pain,no other significant disability   - neg other ROS                 Physical Exam  Normal systems: cardiovascular, pulmonary and dental    Airway   Mallampati: II  Neck ROM: full    Dental     Cardiovascular   Rhythm and rate: regular and normal  (+) murmur       Pulmonary    breath sounds clear to auscultation    Other findings:   Lab Results      Component                Value               Date                      WBC                      4.7                 06/06/2017            Lab Results      Component                Value               Date                      RBC                      4.17                06/06/2017            Lab Results      Component                Value               Date                      HGB                      12.5                06/06/2017            Lab Results      Component                Value               Date                      HCT                      37.5                06/06/2017            No  components found for: MCT  Lab Results      Component                Value               Date                      MCV                      90                  06/06/2017            Lab Results      Component                Value               Date                      MCH                      30.0                06/06/2017            Lab Results      Component                Value               Date                      MCHC                     33.3                06/06/2017            Lab Results      Component                Value               Date                      RDW                      12.6                06/06/2017            Lab Results      Component                Value               Date                      PLT                      329                 06/06/2017              Last Basic Metabolic Panel:  Lab Results      Component                Value               Date                      NA                       133                 06/06/2017             Lab Results      Component                Value               Date                      POTASSIUM                4.7                 06/06/2017            Lab Results      Component                Value               Date                      CHLORIDE                 99                  06/06/2017            Lab Results      Component                Value               Date                      RANDY                      8.7                 06/06/2017            Lab Results      Component                Value               Date                      CO2                      27                  06/06/2017            Lab Results      Component                Value               Date                      BUN                      10                  06/06/2017            Lab Results      Component                Value               Date                      CR                       0.51                06/06/2017            Lab Results      Component                 Value               Date                      GLC                      99                  06/06/2017              Interpretation Summary  Left ventricular size is normal.  The Ejection Fraction is estimated at 30-35%.  Right ventricular function, chamber size, wall motion, and thickness are  normal.  The inferior vena cava is normal.  No pericardial effusion is present.  _____________________________________________________________________________  __        Left Ventricle  Left ventricular size is normal. Left ventricular wall thickness is normal.  The Ejection Fraction is estimated at 30-35%. Normal left ventricular filling  for age. Septal wall akinesis is present.     Right Ventricle  Right ventricular function, chamber size, wall motion, and thickness are  normal.     Atria  Both atria appear normal. The atrial septum is intact as assessed by color  Doppler .     Mitral Valve  The mitral valve is normal. Mild mitral insufficiency is present.        Aortic Valve  The aortic valve is tricuspid. Trace aortic insufficiency is present.     Tricuspid Valve  The tricuspid valve is normal. Trace to mild tricuspid insufficiency is  present. The right ventricular systolic pressure is approximated at 22.0 mmHg  plus the right atrial pressure.     Pulmonic Valve  The pulmonic valve is normal. Trace pulmonic insufficiency is present.     Vessels  The aorta root is normal. The pulmonary artery is normal. The inferior vena  cava is normal.     Pericardium  No pericardial effusion is present.     _____________________________________________________________________________  __  MMode/2D Measurements & Calculations  IVSd: 0.81 cm  LVIDd: 4.9 cm  LVIDs: 3.8 cm  LVPWd: 0.79 cm  FS: 23.2 %  EDV(Teich): 114.1 ml  ESV(Teich): 61.3 ml     LV mass(C)d: 131.9 grams  LV mass(C)dI: 78.1 grams/m2  Ao root diam: 2.6 cm  asc Aorta Diam: 2.6 cm  LVOT diam: 1.9 cm  LVOT area: 2.9 cm2  LA Volume (BP): 53.7 ml  LA Volume Index (BP):  31.8 ml/m2        Doppler Measurements & Calculations  MV E max helen: 103.3 cm/sec  MV A max helen: 55.7 cm/sec  MV E/A: 1.9  MV dec time: 0.17 sec     Ao V2 max: 132.9 cm/sec  Ao max P.0 mmHg  TR max helen: 234.6 cm/sec  TR max P.0 mmHg  Lateral E/e': 11.4  Medial E/e': 12.8           PAC Discussion and Assessment    ASA Classification: 3  Case is suitable for: [place]  Anesthetic techniques and relevant risks discussed: GA  Invasive monitoring and risk discussed: Yes  Types:   Possibility and Risk of blood transfusion discussed: Yes  NPO instructions given:   Additional anesthetic preparation and risks discussed:   Needs early admission to pre-op area:   Other:     PAC Resident/NP Anesthesia Assessment:  Mili Mane is a 51 yo female scheduled for Vagus Nerve Stimulator Replacement on 2017 by Dr. Nolen. PAC referral by Dr. Nolen for assessment and optimization of anesthesia. Previous anesthesia without complications.    1) Cardiac: Echo done in  at [facility] shows EF 40-45% and mild hypokinesis, no follow-up. EKG today NSR, possible left atrial enlargement, left axis deviation and LBBB. Pt referred to cardiology and is scheduled 6/10/2017. HTN, on atenolol at 12.5 mg with elevated B/Ps. Encouraged to F/U with PCP for better control. Pt sent to cardiology due to low EF and no follow-up  2) Pulmonary: Never smoked. Notes intermittent shortness of breath.  3) Neuro: seizure disorder diagnosed at 18 months. Current regimen of carbatrol and VNS. Interventions have helped, but pt still experiences seizures about once a week.      Independent with all activity, but limited to home due to seizure frequency.     PT SEEN IN CARDIOLOGY, EF 30-35%, cath or CT angio recommended. Discussed with Dr. Nolen and surgery will be postponed until cardiac status can be clarified     Pt also evaluated by Dr. Germain. Please see recommendations below. Labs drawn today.  I spent 20 minutes face to face with pt, assessing,  examining, and educating        Reviewed and Signed by PAC Mid-Level Provider/Resident  Mid-Level Provider/Resident: Ruth Barron, MALCOLM  Date: 6/6/2017  Time: 1300    Attending Anesthesiologist Anesthesia Assessment:  Pt is a 51yo female with PMH significant for epilepsy and poorly controlled HTN presenting for preoperative assessment prior to vagal nerve stimulator replacement. Has had several procedures before related to the vagal nerve stimulator currently in place including a battery replacement in 2010 at the . Still having breakthrough seizures.    # HTN: BPs in the 160s-170s systolic and >100 diastolic with repeat BP check. On atenolol 12.5mg daily, no other medications. Noted to have a significant systolic murmur on exam and note an ECHO from 2014 on [facility] that showed EF of 40-45% with mild aortic regurg and mild-mod mitral valve regurgitation without further cardiology workup. ECG today with now significant LBBB not previously noted. Describes vasovagal type events such as dizziness while showering in hot water and subsequent bronchoconstriction-like breathing. Currently undertaking minimal exertion with activity and thus difficult to completely assess METs.  - Cardiology follow up prior to placement for optimization with concern for increased mitral valve involvement.  # Seizures: States last seizure was on Friday, most significant seizure for a while with inability to move head but aware of dog in the room and able to move eyes. Pt says she get about a 1min warning for onset of seizure.    Agree with MAREK assessment. Final anesthetic plan per staff anesthesiologist on the day of surgery.    Naveed Mcgovern MD  06/06/17    I have reviewed the chart and examined th patient.  I have discussed the case with the MAREK and Dr. Mcgovern and concur with their assessment.  The patient is scheduled for vagal nerve stimulator replacement for seizure disorder.  The patient describes worsening PRINCE and  SOB which are of unclear etiology.  There is also some component of orthopnea, though this is complicated by neck pain.  She has minimal activity, less than 2 METS.  She has had an echocardiogram in the past which was abnormal - EF 40%, moderate MR, mild AR.  Her EKG today shows LBBB.  Her BP is grossly elevated on beta blocker only.    She has no known pulmonary or renal disease.    PE:  Thin, pale female.  MPC 2, Lungs clear, CVS  RRR with 2-3/6 systolic murmur transmitted to apex.  Lab:  Na 129 (stable), Cr 0.62, GFR >60    I believe the patient needs to be reevaluated by cardiology given her abnormal studies and worsening symptoms.  A cardiology consultation has been scheduled for 6/10/2017        Reviewed and Signed by PAC Anesthesiologist  Anesthesiologist: Quinn Germain MD  Date: 06/06/2017  Time:   Pass/Fail:   Disposition:     PAC Pharmacist Assessment:        Pharmacist:   Date:   Time:      Anesthesia Plan      History & Physical Review      ASA Status:  2 .    NPO Status:  > 6 hours    Plan for General and LMA with Intravenous induction. Maintenance will be Other.    PONV prophylaxis:  Ondansetron (or other 5HT-3)       Postoperative Care  Postoperative pain management:  IV analgesics.      Consents  Anesthetic plan, risks, benefits and alternatives discussed with:  Patient..                          .

## 2017-06-07 DIAGNOSIS — R56.9 UNSPECIFIED CONVULSIONS (H): ICD-10-CM

## 2017-06-07 LAB — INTERPRETATION ECG - MUSE: NORMAL

## 2017-06-08 RX ORDER — CARBAMAZEPINE 200 MG/1
CAPSULE, EXTENDED RELEASE ORAL
Qty: 240 CAPSULE | Refills: 1 | Status: SHIPPED | OUTPATIENT
Start: 2017-06-08 | End: 2017-07-07

## 2017-06-09 ENCOUNTER — PRE VISIT (OUTPATIENT)
Dept: CARDIOLOGY | Facility: CLINIC | Age: 52
End: 2017-06-09

## 2017-06-09 DIAGNOSIS — G40.909 EPILEPSY (H): Primary | ICD-10-CM

## 2017-06-09 NOTE — TELEPHONE ENCOUNTER
Previsit nursing summary    HPI:  52 year old referred by Uriel Barron APRN CNS for pre op cardiac evaluation for replacement of VNS battery which is running out and is at very low levels.  She uses the magnet a fair amount of the time.  We would need to change her battery. It has been in only for 7 years, which is a little bit unusual.  The usage of the magnet will be monitored in the future.    Ms. Mane is 51.  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  She has been on monotherapy with carbamazepine.  Recently you did a level for this and this was 11.  This has been similar before.  She is on the Carbatrol form at a schedule of 400 mg in the morning, 600 mg in the afternoon and 600 mg in the evening.     Procedures:

## 2017-06-10 ENCOUNTER — OFFICE VISIT (OUTPATIENT)
Dept: CARDIOLOGY | Facility: CLINIC | Age: 52
End: 2017-06-10
Attending: INTERNAL MEDICINE
Payer: COMMERCIAL

## 2017-06-10 VITALS
OXYGEN SATURATION: 98 % | WEIGHT: 136.3 LBS | DIASTOLIC BLOOD PRESSURE: 84 MMHG | BODY MASS INDEX: 22.71 KG/M2 | HEIGHT: 65 IN | HEART RATE: 83 BPM | SYSTOLIC BLOOD PRESSURE: 145 MMHG

## 2017-06-10 DIAGNOSIS — I25.5 ISCHEMIC CARDIOMYOPATHY: ICD-10-CM

## 2017-06-10 DIAGNOSIS — I10 ESSENTIAL HYPERTENSION, BENIGN: ICD-10-CM

## 2017-06-10 DIAGNOSIS — I42.9 CARDIOMYOPATHY, UNSPECIFIED (H): Primary | ICD-10-CM

## 2017-06-10 DIAGNOSIS — G40.209 PARTIAL SYMPTOMATIC EPILEPSY WITH COMPLEX PARTIAL SEIZURES, NOT INTRACTABLE, WITHOUT STATUS EPILEPTICUS (H): ICD-10-CM

## 2017-06-10 PROCEDURE — 99204 OFFICE O/P NEW MOD 45 MIN: CPT | Mod: ZP | Performed by: INTERNAL MEDICINE

## 2017-06-10 PROCEDURE — 99213 OFFICE O/P EST LOW 20 MIN: CPT | Mod: ZF

## 2017-06-10 RX ORDER — ATENOLOL 25 MG/1
50 TABLET ORAL DAILY
COMMUNITY
Start: 2017-06-10 | End: 2019-10-10

## 2017-06-10 ASSESSMENT — PAIN SCALES - GENERAL: PAINLEVEL: NO PAIN (0)

## 2017-06-10 NOTE — MR AVS SNAPSHOT
After Visit Summary   6/10/2017    Mili Mane    MRN: [id number]           Patient Information     Date Of Birth          1965        Visit Information        Provider Department      6/10/2017 9:30 AM Dimitry Calvert MD [facility]        Today's Diagnoses     Cardiomyopathy, unspecified (H)    -  1    Essential hypertension, benign        Partial symptomatic epilepsy with complex partial seizures, not intractable, without status epilepticus (H)          Care Instructions      It was a pleasure to see you in the cardiology clinic today.    If you have any questions, you can reach my nurse, Zaira Alston, at [phone].  Press Option #1 for the [facility], and then press Option #3 for nursing.    Note the new medications:  I will await the results of your ECHO before starting an ACE-I or ARB.  ECHO is scheduled for 8:15 on Monday, June 12th    Stop the following medications: None    The results from today include: none    I would like you to follow up with Dr. Calvert in 1 week for results.    Sincerely,      Dimitry Calvert MD       Living with Cardiomyopathy  Your doctor will outline a treatment plan to help you live better with cardiomyopathy and stop it from getting worse. Be sure to follow your doctor s instructions. You can also make some lifestyle changes that will help your heart.     Weigh yourself daily and write down your results.   Follow your treatment plan  Be sure to visit your doctor regularly. Mention any problems you are having with your treatment plan. Be honest if you are not doing something your doctor has suggested. He or she may be able to make some changes to help your plan work better for you.  Balance activity and rest  Having cardiomyopathy may mean you get tired more quickly. But this shouldn t keep you from being active. In fact, being active may help you feel better. Talk with your doctor about how much activity is  right for you.  Take steps to help your heart    Stop smoking. Smoking damages your heart muscle and blood vessels. It reduces the oxygen in your blood. It makes your heart beat faster and work harder. And it can make a heart attack, also known as acute myocardial infarction, or AMI, more likely.    Lose any excess weight. The more extra weight you have, the harder your heart has to work to pump blood through your body.    Avoid alcohol. Drinking alcohol may make your cardiomyopathy worse.    Eat less salt. Salt is the main source of sodium in our diet. Too much sodium can make the symptoms of cardiomyopathy worse. Your doctor may tell you to limit your sodium intake to less than 1,500 mg a day. That s about half a teaspoon of salt.  Keep track of your weight  Rapid weight gain may mean that you are retaining fluid, which is one of the signs of heart failure. Keeping track of your weight helps you detect this weight gain early and prevent further damage to your heart. To keep track of your weight:    Weigh yourself at the same time each day, after you urinate. Wear the same thing each time. Write down your weight each day.    Don t stop weighing yourself. If you forget one day, weigh again the next morning.    Call your doctor if you gain more than 2 pounds in 1 day, more than 5 pounds in 1 week, or whatever weight gain you were told to report by your doctor.  Call your doctor  Contact your doctor if you have:     Faint or have dizzy spells.    Notice new symptoms from your medication.    Have a new onset of coughing.    Have trouble breathing, especially if it occurs while at rest or lying down.    Get tired faster.    Begin urinating less often.    Find that your feet or ankles swell more than usual, if you have swelling in your legs or abdomen, or if the veins in your neck stick out more than usual.     Have tightness or pain in your chest.    6724-4776 [employer]. [address] [place] [address],  [place] [address]. All rights reserved. This information is not intended as a substitute for professional medical care. Always follow your healthcare professional's instructions.                Follow-ups after your visit        Your next 10 appointments already scheduled     Jun 12, 2017  8:30 AM CDT   Ech Complete with TOR   [facility] Echo ([facility] [place])    [address] [place] [id number] [phone]           1.  Please bring or wear a comfortable two-piece outfit. 2.  You may eat, drink and take your normal medicines. 3.  For any questions that cannot be answered, please contact the ordering physician            Jun 20, 2017   Procedure with Eliseo Nolen MD   [facility] [place], Same Day Surgery (--)    [address]  [facility] [place] [id number] [phone]            Jul 07, 2017 11:30 AM CDT   (Arrive by 11:15 AM)   Return Seizure with Barney Kendall MD   [facility] Neurology ([facility])    [address] [place] [id number] [phone]              Future tests that were ordered for you today     Open Future Orders        Priority Expected Expires Ordered    Echocardiogram Complete Routine  6/10/2018 6/10/2017            Who to contact     If you have questions or need follow up information about today's clinic visit or your schedule please contact [facility] directly at [phone].  Normal or non-critical lab and imaging results will be communicated to you by MyChart, letter or phone within 4 business days after the clinic has received the results. If you do not hear from us within 7 days, please contact the clinic through MyChart or phone. If you have a critical or abnormal lab result, we will notify you by phone as soon as possible.  Submit refill requests through [employer] or call your pharmacy and they will forward the refill request to us. Please allow 3 business days for your refill to be  "completed.          Additional Information About Your Visit        [email or web address] Information     [employer] lets you send messages to your doctor, view your test results, renew your prescriptions, schedule appointments and more. To sign up, go to [email or web address] . Click on \"Log in\" on the left side of the screen, which will take you to the Welcome page. Then click on \"Sign up Now\" on the right side of the page.     You will be asked to enter the access code listed below, as well as some personal information. Please follow the directions to create your username and password.     Your access code is: [email or web address]  Expires: 2017 10:38 AM     Your access code will  in 90 days. If you need help or a new code, please call your [place] clinic or [phone].        Care EveryWhere ID     This is your [facility] EveryWhere ID. This could be used by other organizations to access your [place] medical records  [id number]        Your Vitals Were     Pulse Height Last Period Pulse Oximetry BMI (Body Mass Index)       83 1.651 m (5' 5\") 2017 (Exact Date) 98% 22.68 kg/m2        Blood Pressure from Last 3 Encounters:   06/10/17 145/84   17 (!) 160/108   17 162/86    Weight from Last 3 Encounters:   06/10/17 61.8 kg (136 lb 4.8 oz)   17 62.9 kg (138 lb 9.6 oz)   17 64.5 kg (142 lb 3.2 oz)               Primary Care Provider Office Phone # Fax #    Prakash Rider [phone]       [facility] [address] [facility] [address]        Thank you!     Thank you for choosing [facility]  for your care. Our goal is always to provide you with excellent care. Hearing back from our patients is one way we can continue to improve our services. Please take a few minutes to complete the written survey that you may receive in the mail after your visit with us. Thank you!             Your Updated Medication List - Protect others around you: Learn how to safely use, " store and throw away your medicines at www.disposemymeds.org.          This list is accurate as of: 6/10/17 10:28 AM.  Always use your most recent med list.                   Brand Name Dispense Instructions for use    atenolol 25 MG tablet    TENORMIN     Take 1 tablet (25 mg) by mouth every morning       CARBATROL 200 MG 12 hr capsule   Generic drug:  carBAMazepine     240 capsule    Take 2 tabs (400mg) by mouth in the AM, 3 tabs (600mg) in the afternoon, and 3 tabs (600mg) in the PM.       IBUPROFEN PO      Take 1-2 tablets by mouth as needed for moderate pain       LORazepam 1 MG tablet    ATIVAN    60 tablet    Take 1 tablet (1 mg) by mouth 2 times daily       MULTI-VITAMINS Tabs      Take 1 tablet by mouth every evening

## 2017-06-10 NOTE — PROGRESS NOTES
CARDIOLOGY CONSULTATION    Referring Provider:  Uriel Barron  Primary Care Provider:   Prakash Rider  Indication for Consultation:  Heart murmur, Pre Op    HPI: Mili Mane is a 52 year old female being seen today for cardiovascular evaluation prior to planned generator replacement for vagal nerve stimulator (uncontrolled seizures).   The patient's risk factor profile is: (+) HTN, (-) DM, (-) hypercholesterolemia, (-) tobacco use, (-) fam Hx premature CAD.  The patient has no history of documented CAD.  She had LBBB documented in 2014.  She had ECHO in Feb 2014 that showed LVEF 40-45%, mildly hypokinetic anteroseptum and septum, aortic valve sclerosis, mild-moderate MR, mild TR.  She believes the ECHO was obtained for evaluation of PRINCE.  The patient believes she is on Atenolol for HTN.  Her BPs have been elevated and her Atenolol was recently increased from 12.5 mg qd to 25 mg qd.  She has not been started on ACE-I or ARB, unclear reasons.  The patient has no Hx of PAD or cerebrovascular disease.   The patient has not undergone prior stress study, cardiac catheterization, or EP study.   The patient denies any recent history of chest discomfort.  She notes PRINCE with ascending 3 flights.  She can walk 40 minutes on a level surface.  She paces herself at a slow rate.  She denies PND, orthopnea, pedal edema.  She denies palpitations, lightheadedness, and syncope.    She has no history of rheumatic fever when she was younger.    PAST MEDICAL HISTORY:  Past Medical History:   Diagnosis Date     Adjustment disorder with depressed mood      Epileptic seizure (H)      HTN (hypertension)        CURRENT MEDICATIONS:  Current Outpatient Prescriptions   Medication Sig Dispense Refill     CARBATROL 200 MG 12 hr capsule Take 2 tabs (400mg) by mouth in the AM, 3 tabs (600mg) in the afternoon, and 3 tabs (600mg) in the PM. 240 capsule 1     LORazepam (ATIVAN) 1 MG tablet Take 1 tablet (1 mg) by mouth 2 times  daily 60 tablet 5     atenolol (TENORMIN) 25 MG tablet Take 0.5 tablets (12.5 mg) by mouth daily (Patient taking differently: Take 25 mg by mouth every morning ) 30 tablet 0     Multiple Vitamin (MULTI-VITAMINS) TABS Take 1 tablet by mouth every evening        IBUPROFEN PO Take 1-2 tablets by mouth as needed for moderate pain         PAST SURGICAL HISTORY:  Past Surgical History:   Procedure Laterality Date     vnp         ALLERGIES  Fish allergy; Nuts; Other [seasonal allergies]; Penicillins; and Pollen extract/tree extract    FAMILY HX:  Family History   Problem Relation Age of Onset     Hypertension Mother      Lipids Mother      Cardiovascular Maternal Uncle      Cardiovascular Maternal Uncle      Cardiovascular Paternal Grandfather      CANCER Maternal Grandmother      gastric     Asthma Sister        SOCIAL HX:  Social History     Social History     Marital status: Single     Spouse name: N/A     Number of children: N/A     Years of education: N/A     Social History Main Topics     Smoking status: Never Smoker     Smokeless tobacco: Never Used     Alcohol use No     Drug use: No     Sexual activity: Not Asked     Other Topics Concern      Service No     Blood Transfusions No     Caffeine Concern Yes     Occupational Exposure No     Hobby Hazards No     Weight Concern No     Special Diet No     Back Care No     Exercise Yes     inconsistent.  Walks her dog occasionally     Seat Belt Yes     Self-Exams No     Social History Narrative    Lives with her parents.        ROS:  Constitutional: No fever, chills, or sweats. No weight gain/loss.   HEENT: No visual disturbance, ear ache, epistaxis, sore throat.   Allergies/Immunologic: Negative.   Respiratory: No cough, hemoptysis.   Cardiovascular: As per HPI.   GI: No nausea, vomiting, hematemesis, melena, or hematochezia.   : No urinary frequency, dysuria, or hematuria.   Integument: No rash.   Psychiatric: No anxiety / depression.   Neuro: No speech  "disturbance, focal sensory or motor deficit.   Endocrinology: No polyuria / polyphagia.   Musculoskeletal: No myalgia.    VITAL SIGNS:  /84 (BP Location: Left arm, Patient Position: Chair, Cuff Size: Adult Regular)  Pulse 83  Ht 1.651 m (5' 5\")  Wt 61.8 kg (136 lb 4.8 oz)  LMP 2017 (Exact Date)  SpO2 98%  BMI 22.68 kg/m2  Body mass index is 22.68 kg/(m^2).  Wt Readings from Last 2 Encounters:   06/10/17 61.8 kg (136 lb 4.8 oz)   17 62.9 kg (138 lb 9.6 oz)       PHYSICAL EXAM  Mili Mane is a 52 year old female.in no acute distress.  HEENT: Eyes Nonicteric.  Neck: JVP normal.  Carotids +3/3 bilaterally without bruits.  Lungs: CTA.  Cor: RRR. Normal S1 and paradoxical splitting (wide) of S2.  No murmur, rub, or gallop.  PMI in Lf 5th ICS.  Abd: Soft, nontender, nondistended.  NABS.  No pulsatile mass.  Extremities: No C/C/E.  Pulses +3/3 symmetric in upper and lower extremities.  Neuro: Grossly intact.  Psych: A&O x 3.  Skin: No rash.    LABS  Recent Labs   Lab Test  17   1134  14   1425   WBC  4.7  6.5   HGB  12.5  12.3   HCT  37.5  36.4   PLT  329  319     Recent Labs   Lab Test  17   1134  14   1108   13   1239   NA  133  129*   < >  127*   POTASSIUM  4.7  4.2   < >  3.9   CHLORIDE  99  96   < >  92*   CO2  27  23   < >  23   GLC  99   --    --   104*   BUN  10   --    --   8   CR  0.51*   --    --   0.49*   RANDY  8.7   --    --   9.1    < > = values in this interval not displayed.       EK17  NSR.  LBBB (identified in 2014)    ECHO: 14  Left Ventricle Normal left ventricular size. Left ventricular ejection fraction is estimated at 40-45%.   Mildly hypokinetic anteroseptum and septum.   Normal left ventricular wall thickness.  LV Diastology Normal left ventricular diastolic filling pattern for age.  Right Ventricle The right ventricle is normal in size and function.  Left Atrium The left atrium is normal in size.  Right Atrium The right " atrium is normal in size.  IA Septum: No left to right shunt was detected by limited color flow Doppler interrogation of the interatrial septum.  IVC Normal inferior vena cava (IVC) size.  Aortic Valve Aortic valve sclerosis. Mild aortic regurgitation.  Mitral Valve Mitral annular calcification. Mild-to-moderate mitral regurgitation.  Tricuspid Valve Mild tricuspid regurgitation.mild tricuspid regurgitation. Cannot estimate PA   pressures on this study.  Pulmonic Valve Structurally normal pulmonic valve without significant stenosis.  There is no   significant pulmonic regurgitation.  Aorta The sinuses of Valsalva, sinotubular junction, and ascending aorta appear normal.  Pericardium Normal pericardium without effusion.    STRESS TEST:  None    CARDIAC CATH: None    ASSESSMENT AND PLAN:   1. Cardiomyopathy.  Ms. Mane was diagnosed with cardiomyopathy nearly three years ago by outside ECHO but I do not see any documentation that would indicate she was ever specifically treated.  She describes mild PRINCE that is Class I in nature.  She may therefore have ACC/AHA Stage B cardiomyopathy.   Her symptoms are NYHA Class I and there is no evidence of volume overload on exam and there is no reason to prescribe a diuretic at this time.  I would like to first repeat the ECHO to ensure the LVEF is reduced and not worse.  I will hold off on starting an ACE-I until we identify the LVEF.  If the LVEF is low, I would recommence an evaluation for possible CAD.  I need to see if the generator for the vagal stimulator which is in the subpectoral muscle on the left side (similar to pacemaker generator) would preclude a coronary CTA.  If this is not possible, I would recommend coronary angiography.    2. Valvular heart disease.  Check ECHO to reassess aortic valve sclerosis and mitral valve regurgitation.    3. Cardiovascular Pre Op Risk.  The patient is scheduled for a low risk surgery and I do not believe the cardiomyopathy or  valvular heart disease pose a significant risk.  One would want to be cautious about volume overload.  I would like to have the ECHO performed in the next several days to ensure that information is available for anesthesiologist.    FOLLOW UP:  ECHO    ECHO (6/12/17):  Left ventricular size is normal.  The Ejection Fraction is estimated at 30-35%.  Right ventricular function, chamber size, wall motion, and thickness are normal.  The inferior vena cava is normal.  No pericardial effusion is present.    ADDENDUM (6/12/17): Plan on coronary CTA (assuming vagal nerve stimulator does not interfere with imaging).    Chest CT:  6/15/17  Impression:  1. Two 5 mm subpleural right lower lobe pulmonary nodules. No need to follow unless high risk, where follow up is then optional per Fleischner guidelines.    CORONARY CTA (6/15/17):  IMPRESSION:    1. Total Agatston score 0, placing the patient in the 0 percentile when compared to age and gender matched control group.    2. Essentially normal coronary arteries with minimal trivial scattered soft plaque in the mid and distal left anterior descending coronary artery without detectable stenosis    ADDENDUM (6/20/17): Coronary CTA showed minimal plaque.  Recommend ASA 81 mg qd and low dose statin, Lipitor 20 mg qd.   I would not recommend any further cardiac testing before anticipated surgery.  Her cardiomyopathy remains an issue and caution should be exercised to avoid overhydration and precipitation of CHF.    Dimitry Calvert MD    Divisions of Cardiology  [place], [place]    CC  Patient Care Team:  Prakash Rider MD as PCP - General (Family Practice)  Barney Kendall MD as MD (Neurology)  TOPHER JEFFERY

## 2017-06-10 NOTE — PATIENT INSTRUCTIONS
It was a pleasure to see you in the cardiology clinic today.    If you have any questions, you can reach my nurse, Zaira Alston, at [phone].  Press Option #1 for the [facility], and then press Option #3 for nursing.    Note the new medications:  I will await the results of your ECHO before starting an ACE-I or ARB.  ECHO is scheduled for 8:15 on Monday, June 12th    Stop the following medications: None    The results from today include: none    I would like you to follow up with Dr. Calvert in 1 week for results.    Sincerely,      Dimitry Calvert MD       Living with Cardiomyopathy  Your doctor will outline a treatment plan to help you live better with cardiomyopathy and stop it from getting worse. Be sure to follow your doctor s instructions. You can also make some lifestyle changes that will help your heart.     Weigh yourself daily and write down your results.   Follow your treatment plan  Be sure to visit your doctor regularly. Mention any problems you are having with your treatment plan. Be honest if you are not doing something your doctor has suggested. He or she may be able to make some changes to help your plan work better for you.  Balance activity and rest  Having cardiomyopathy may mean you get tired more quickly. But this shouldn t keep you from being active. In fact, being active may help you feel better. Talk with your doctor about how much activity is right for you.  Take steps to help your heart    Stop smoking. Smoking damages your heart muscle and blood vessels. It reduces the oxygen in your blood. It makes your heart beat faster and work harder. And it can make a heart attack, also known as acute myocardial infarction, or AMI, more likely.    Lose any excess weight. The more extra weight you have, the harder your heart has to work to pump blood through your body.    Avoid alcohol. Drinking alcohol may make your cardiomyopathy worse.    Eat less salt. Salt is  the main source of sodium in our diet. Too much sodium can make the symptoms of cardiomyopathy worse. Your doctor may tell you to limit your sodium intake to less than 1,500 mg a day. That s about half a teaspoon of salt.  Keep track of your weight  Rapid weight gain may mean that you are retaining fluid, which is one of the signs of heart failure. Keeping track of your weight helps you detect this weight gain early and prevent further damage to your heart. To keep track of your weight:    Weigh yourself at the same time each day, after you urinate. Wear the same thing each time. Write down your weight each day.    Don t stop weighing yourself. If you forget one day, weigh again the next morning.    Call your doctor if you gain more than 2 pounds in 1 day, more than 5 pounds in 1 week, or whatever weight gain you were told to report by your doctor.  Call your doctor  Contact your doctor if you have:     Faint or have dizzy spells.    Notice new symptoms from your medication.    Have a new onset of coughing.    Have trouble breathing, especially if it occurs while at rest or lying down.    Get tired faster.    Begin urinating less often.    Find that your feet or ankles swell more than usual, if you have swelling in your legs or abdomen, or if the veins in your neck stick out more than usual.     Have tightness or pain in your chest.    2143-3513 [employer]. [address] [place] [address], [place] [address]. All rights reserved. This information is not intended as a substitute for professional medical care. Always follow your healthcare professional's instructions.

## 2017-06-10 NOTE — LETTER
6/10/2017      RE: Mili Mane  [address]  [place] [address]       Dear Colleague,    Thank you for the opportunity to participate in the care of your patient, Mili Mane, at the [facility]. Please see a copy of my visit note below.    CARDIOLOGY CONSULTATION    Referring Provider:  Uriel Barron  Primary Care Provider:   Prakash Rider  Indication for Consultation:  Heart murmur, Pre Op    HPI: Mili Mane is a 52 year old female being seen today for cardiovascular evaluation prior to planned generator replacement for vagal nerve stimulator (uncontrolled seizures).   The patient's risk factor profile is: (+) HTN, (-) DM, (-) hypercholesterolemia, (-) tobacco use, (-) fam Hx premature CAD.  The patient has no history of documented CAD.  She had LBBB documented in 2014.  She had ECHO in Feb 2014 that showed LVEF 40-45%, mildly hypokinetic anteroseptum and septum, aortic valve sclerosis, mild-moderate MR, mild TR.  She believes the ECHO was obtained for evaluation of PRINCE.  The patient believes she is on Atenolol for HTN.  Her BPs have been elevated and her Atenolol was recently increased from 12.5 mg qd to 25 mg qd.  She has not been started on ACE-I or ARB, unclear reasons.  The patient has no Hx of PAD or cerebrovascular disease.   The patient has not undergone prior stress study, cardiac catheterization, or EP study.   The patient denies any recent history of chest discomfort.  She notes PRINCE with ascending 3 flights.  She can walk 40 minutes on a level surface.  She paces herself at a slow rate.  She denies PND, orthopnea, pedal edema.  She denies palpitations, lightheadedness, and syncope.    She has no history of rheumatic fever when she was younger.    PAST MEDICAL HISTORY:  Past Medical History:   Diagnosis Date     Adjustment disorder with depressed mood      Epileptic seizure (H)      HTN (hypertension)         CURRENT MEDICATIONS:  Current Outpatient Prescriptions   Medication Sig Dispense Refill     CARBATROL 200 MG 12 hr capsule Take 2 tabs (400mg) by mouth in the AM, 3 tabs (600mg) in the afternoon, and 3 tabs (600mg) in the PM. 240 capsule 1     LORazepam (ATIVAN) 1 MG tablet Take 1 tablet (1 mg) by mouth 2 times daily 60 tablet 5     atenolol (TENORMIN) 25 MG tablet Take 0.5 tablets (12.5 mg) by mouth daily (Patient taking differently: Take 25 mg by mouth every morning ) 30 tablet 0     Multiple Vitamin (MULTI-VITAMINS) TABS Take 1 tablet by mouth every evening        IBUPROFEN PO Take 1-2 tablets by mouth as needed for moderate pain         PAST SURGICAL HISTORY:  Past Surgical History:   Procedure Laterality Date     vnp         ALLERGIES  Fish allergy; Nuts; Other [seasonal allergies]; Penicillins; and Pollen extract/tree extract    FAMILY HX:  Family History   Problem Relation Age of Onset     Hypertension Mother      Lipids Mother      Cardiovascular Maternal Uncle      Cardiovascular Maternal Uncle      Cardiovascular Paternal Grandfather      CANCER Maternal Grandmother      gastric     Asthma Sister        SOCIAL HX:  Social History     Social History     Marital status: Single     Spouse name: N/A     Number of children: N/A     Years of education: N/A     Social History Main Topics     Smoking status: Never Smoker     Smokeless tobacco: Never Used     Alcohol use No     Drug use: No     Sexual activity: Not Asked     Other Topics Concern      Service No     Blood Transfusions No     Caffeine Concern Yes     Occupational Exposure No     Hobby Hazards No     Weight Concern No     Special Diet No     Back Care No     Exercise Yes     inconsistent.  Walks her dog occasionally     Seat Belt Yes     Self-Exams No     Social History Narrative    Lives with her parents.        ROS:  Constitutional: No fever, chills, or sweats. No weight gain/loss.   HEENT: No visual disturbance, ear ache, epistaxis,  "sore throat.   Allergies/Immunologic: Negative.   Respiratory: No cough, hemoptysis.   Cardiovascular: As per HPI.   GI: No nausea, vomiting, hematemesis, melena, or hematochezia.   : No urinary frequency, dysuria, or hematuria.   Integument: No rash.   Psychiatric: No anxiety / depression.   Neuro: No speech disturbance, focal sensory or motor deficit.   Endocrinology: No polyuria / polyphagia.   Musculoskeletal: No myalgia.    VITAL SIGNS:  /84 (BP Location: Left arm, Patient Position: Chair, Cuff Size: Adult Regular)  Pulse 83  Ht 1.651 m (5' 5\")  Wt 61.8 kg (136 lb 4.8 oz)  LMP 2017 (Exact Date)  SpO2 98%  BMI 22.68 kg/m2  Body mass index is 22.68 kg/(m^2).  Wt Readings from Last 2 Encounters:   06/10/17 61.8 kg (136 lb 4.8 oz)   17 62.9 kg (138 lb 9.6 oz)       PHYSICAL EXAM  Mili Mane is a 52 year old female.in no acute distress.  HEENT: Eyes Nonicteric.  Neck: JVP normal.  Carotids +3/3 bilaterally without bruits.  Lungs: CTA.  Cor: RRR. Normal S1 and paradoxical splitting (wide) of S2.  No murmur, rub, or gallop.  PMI in Lf 5th ICS.  Abd: Soft, nontender, nondistended.  NABS.  No pulsatile mass.  Extremities: No C/C/E.  Pulses +3/3 symmetric in upper and lower extremities.  Neuro: Grossly intact.  Psych: A&O x 3.  Skin: No rash.    LABS  Recent Labs   Lab Test  17   1134  14   1425   WBC  4.7  6.5   HGB  12.5  12.3   HCT  37.5  36.4   PLT  329  319     Recent Labs   Lab Test  17   1134  14   1108   13   1239   NA  133  129*   < >  127*   POTASSIUM  4.7  4.2   < >  3.9   CHLORIDE  99  96   < >  92*   CO2  27  23   < >  23   GLC  99   --    --   104*   BUN  10   --    --   8   CR  0.51*   --    --   0.49*   RANDY  8.7   --    --   9.1    < > = values in this interval not displayed.       EK17  NSR.  LBBB (identified in 2014)    ECHO: 14  Left Ventricle Normal left ventricular size. Left ventricular ejection fraction is estimated " at 40-45%.   Mildly hypokinetic anteroseptum and septum.   Normal left ventricular wall thickness.  LV Diastology Normal left ventricular diastolic filling pattern for age.  Right Ventricle The right ventricle is normal in size and function.  Left Atrium The left atrium is normal in size.  Right Atrium The right atrium is normal in size.  IA Septum: No left to right shunt was detected by limited color flow Doppler interrogation of the interatrial septum.  IVC Normal inferior vena cava (IVC) size.  Aortic Valve Aortic valve sclerosis. Mild aortic regurgitation.  Mitral Valve Mitral annular calcification. Mild-to-moderate mitral regurgitation.  Tricuspid Valve Mild tricuspid regurgitation.mild tricuspid regurgitation. Cannot estimate PA   pressures on this study.  Pulmonic Valve Structurally normal pulmonic valve without significant stenosis.  There is no   significant pulmonic regurgitation.  Aorta The sinuses of Valsalva, sinotubular junction, and ascending aorta appear normal.  Pericardium Normal pericardium without effusion.    STRESS TEST:  None    CARDIAC CATH: None    ASSESSMENT AND PLAN:   1. Cardiomyopathy.  Ms. Mane was diagnosed with cardiomyopathy nearly three years ago by outside ECHO but I do not see any documentation that would indicate she was ever specifically treated.  She describes mild PRINCE that is Class I in nature.  She may therefore have ACC/AHA Stage B cardiomyopathy.   Her symptoms are NYHA Class I and there is no evidence of volume overload on exam and there is no reason to prescribe a diuretic at this time.  I would like to first repeat the ECHO to ensure the LVEF is reduced and not worse.  I will hold off on starting an ACE-I until we identify the LVEF.  If the LVEF is low, I would recommence an evaluation for possible CAD.  I need to see if the generator for the vagal stimulator which is in the subpectoral muscle on the left side (similar to pacemaker generator) would preclude a  coronary CTA.  If this is not possible, I would recommend coronary angiography.    2. Valvular heart disease.  Check ECHO to reassess aortic valve sclerosis and mitral valve regurgitation.    3. Cardiovascular Pre Op Risk.  The patient is scheduled for a low risk surgery and I do not believe the cardiomyopathy or valvular heart disease pose a significant risk.  One would want to be cautious about volume overload.  I would like to have the ECHO performed in the next several days to ensure that information is available for anesthesiologist.    FOLLOW UP:  ECHO      Diimtry Calvert MD    Divisions of Cardiology  [place], [place]    CC  Patient Care Team:  Prakash Rider MD as PCP - General (Family Practice)  Barney Kendall MD as MD (Neurology)  TOPHER JEFFERY

## 2017-06-12 ENCOUNTER — RADIANT APPOINTMENT (OUTPATIENT)
Dept: CARDIOLOGY | Facility: CLINIC | Age: 52
End: 2017-06-12
Attending: INTERNAL MEDICINE

## 2017-06-12 DIAGNOSIS — I42.9 CARDIOMYOPATHY, UNSPECIFIED (H): ICD-10-CM

## 2017-06-13 DIAGNOSIS — I42.9 CARDIOMYOPATHY, UNSPECIFIED (H): Primary | ICD-10-CM

## 2017-06-13 NOTE — LETTER
June 13, 2017      TO: Mili Mane  [address]  [place] [address]         Dear Mili,    Enclosed are the results of the recent ECHO. The EF (ejection fraction) is slightly lower than it has been in the past. Dr. Calvert would like you to proceed with a coronary CTA to make sure you do not have any blockages in your coronary arteries.    It was a pleasure to see you at your last clinic visit. Please do not hesitate to call me if you have any questions or concerns.    Sincerely,    Zaira Alston RN  Nurse Coordinator for Dimitry Calvert MD  [phone]                              Procedure  Echocardiogram with two-dimensional, color and spectral Doppler performed.  _____________________________________________________________________________  __        Interpretation Summary  Left ventricular size is normal.  The Ejection Fraction is estimated at 30-35%.  Right ventricular function, chamber size, wall motion, and thickness are  normal.  The inferior vena cava is normal.  No pericardial effusion is present.  _____________________________________________________________________________  __        Left Ventricle  Left ventricular size is normal. Left ventricular wall thickness is normal.  The Ejection Fraction is estimated at 30-35%. Normal left ventricular filling  for age. Septal wall akinesis is present.     Right Ventricle  Right ventricular function, chamber size, wall motion, and thickness are  normal.     Atria  Both atria appear normal. The atrial septum is intact as assessed by color  Doppler .     Mitral Valve  The mitral valve is normal. Mild mitral insufficiency is present.        Aortic Valve  The aortic valve is tricuspid. Trace aortic insufficiency is present.     Tricuspid Valve  The tricuspid valve is normal. Trace to mild tricuspid insufficiency is  present. The right ventricular systolic pressure is approximated at 22.0 mmHg  plus the right atrial pressure.     Pulmonic  Valve  The pulmonic valve is normal. Trace pulmonic insufficiency is present.     Vessels  The aorta root is normal. The pulmonary artery is normal. The inferior vena  cava is normal.     Pericardium  No pericardial effusion is present.     _____________________________________________________________________________  __  MMode/2D Measurements & Calculations  IVSd: 0.81 cm  LVIDd: 4.9 cm  LVIDs: 3.8 cm  LVPWd: 0.79 cm  FS: 23.2 %  EDV(Teich): 114.1 ml  ESV(Teich): 61.3 ml     LV mass(C)d: 131.9 grams  LV mass(C)dI: 78.1 grams/m2  Ao root diam: 2.6 cm  asc Aorta Diam: 2.6 cm  LVOT diam: 1.9 cm  LVOT area: 2.9 cm2  LA Volume (BP): 53.7 ml  LA Volume Index (BP): 31.8 ml/m2        Doppler Measurements & Calculations  MV E max helen: 103.3 cm/sec  MV A max helen: 55.7 cm/sec  MV E/A: 1.9  MV dec time: 0.17 sec     Ao V2 max: 132.9 cm/sec  Ao max P.0 mmHg  TR max helen: 234.6 cm/sec  TR max P.0 mmHg  Lateral E/e': 11.4  Medial E/e': 12.8

## 2017-06-13 NOTE — NURSING NOTE
I have ordered a coronary CTA for you. If you call [phone] they can help you schedule a day and time.  Prep for the procedure includes:    Take two pills of atenolol 25 mg the day before the  procedure. (total of 50 mg)  Take two pills of atenolol 25 mg the day the morning of the procedure.      Scheduled for Brie 15, Thursday, arrive at 12:45 at the [facility]

## 2017-06-13 NOTE — PATIENT INSTRUCTIONS
I have ordered a coronary CTA for you.   Scheduled for Brie 15, Thursday, arrive at 12:45 at the [facility] Waiting room.  Prep for the procedure includes:    Take two pills of atenolol 25 mg the day before the  procedure. (total of 50 mg)  Take two pills of atenolol 25 mg the day the morning of the procedure.      Atenolol is a beta-blocker, it slows your heart rate down and can lower your blood pressure. Be aware that you may feel dizzy or lightheaded when taking this medication. A  is not required for this procedure but you may want to bring someone with you if you are experiencing the above symptoms.    Encourage oral hydration before and day of exam a total of four to eight,   8 ounce glasses of water.   No caffeine 12 hours prior to the procedure.  You can have fluids before the exam but no solid foods 3 hours prior to the procedure.    Zaiar Alston RN  Care Coordinator for Cardiology  [phone]    Patient understands and agrees to the procedure.

## 2017-06-15 ENCOUNTER — HOSPITAL ENCOUNTER (OUTPATIENT)
Dept: CT IMAGING | Facility: CLINIC | Age: 52
Discharge: HOME | End: 2017-06-15
Attending: INTERNAL MEDICINE | Admitting: INTERNAL MEDICINE
Payer: COMMERCIAL

## 2017-06-15 VITALS
HEART RATE: 83 BPM | OXYGEN SATURATION: 100 % | DIASTOLIC BLOOD PRESSURE: 94 MMHG | RESPIRATION RATE: 16 BRPM | SYSTOLIC BLOOD PRESSURE: 134 MMHG

## 2017-06-15 DIAGNOSIS — I42.9 CARDIOMYOPATHY, UNSPECIFIED (H): ICD-10-CM

## 2017-06-15 PROCEDURE — 75574 CT ANGIO HRT W/3D IMAGE: CPT

## 2017-06-15 PROCEDURE — 25000132 ZZH RX MED GY IP 250 OP 250 PS 637: Performed by: INTERNAL MEDICINE

## 2017-06-15 PROCEDURE — 75574 CT ANGIO HRT W/3D IMAGE: CPT | Mod: 26 | Performed by: INTERNAL MEDICINE

## 2017-06-15 PROCEDURE — 25000128 H RX IP 250 OP 636: Performed by: INTERNAL MEDICINE

## 2017-06-15 PROCEDURE — 25000125 ZZHC RX 250: Performed by: INTERNAL MEDICINE

## 2017-06-15 RX ORDER — NITROGLYCERIN 0.4 MG/1
0.4 TABLET SUBLINGUAL
Status: DISCONTINUED | OUTPATIENT
Start: 2017-06-15 | End: 2017-06-16 | Stop reason: HOSPADM

## 2017-06-15 RX ORDER — IOPAMIDOL 755 MG/ML
100 INJECTION, SOLUTION INTRAVASCULAR ONCE
Status: COMPLETED | OUTPATIENT
Start: 2017-06-15 | End: 2017-06-15

## 2017-06-15 RX ORDER — ACYCLOVIR 200 MG/1
0-1 CAPSULE ORAL
Status: DISCONTINUED | OUTPATIENT
Start: 2017-06-15 | End: 2017-06-16 | Stop reason: HOSPADM

## 2017-06-15 RX ORDER — METOPROLOL TARTRATE 50 MG
50-100 TABLET ORAL
Status: COMPLETED | OUTPATIENT
Start: 2017-06-15 | End: 2017-06-15

## 2017-06-15 RX ORDER — METOPROLOL TARTRATE 1 MG/ML
5-15 INJECTION, SOLUTION INTRAVENOUS
Status: DISCONTINUED | OUTPATIENT
Start: 2017-06-15 | End: 2017-06-16 | Stop reason: HOSPADM

## 2017-06-15 RX ADMIN — METOPROLOL TARTRATE 50 MG: 50 TABLET ORAL at 12:12

## 2017-06-15 RX ADMIN — METOPROLOL TARTRATE 40 MG: 5 INJECTION INTRAVENOUS at 12:59

## 2017-06-15 RX ADMIN — NITROGLYCERIN 0.4 MG: 0.4 TABLET SUBLINGUAL at 13:27

## 2017-06-15 RX ADMIN — IOPAMIDOL 100 ML: 755 INJECTION, SOLUTION INTRAVENOUS at 13:31

## 2017-06-20 ENCOUNTER — HOSPITAL ENCOUNTER (OUTPATIENT)
Facility: CLINIC | Age: 52
Discharge: HOME | End: 2017-06-20
Attending: NEUROLOGICAL SURGERY | Admitting: NEUROLOGICAL SURGERY
Payer: COMMERCIAL

## 2017-06-20 ENCOUNTER — ANESTHESIA (OUTPATIENT)
Dept: SURGERY | Facility: CLINIC | Age: 52
End: 2017-06-20
Payer: COMMERCIAL

## 2017-06-20 VITALS
BODY MASS INDEX: 22.66 KG/M2 | TEMPERATURE: 96.4 F | RESPIRATION RATE: 16 BRPM | DIASTOLIC BLOOD PRESSURE: 76 MMHG | WEIGHT: 136.02 LBS | SYSTOLIC BLOOD PRESSURE: 138 MMHG | OXYGEN SATURATION: 100 % | HEART RATE: 71 BPM | HEIGHT: 65 IN

## 2017-06-20 LAB
ABO + RH BLD: NORMAL
ABO + RH BLD: NORMAL
APTT PPP: 38 SEC (ref 22–37)
BLD GP AB SCN SERPL QL: NORMAL
BLOOD BANK CMNT PATIENT-IMP: NORMAL
HGB BLD-MCNC: 11.2 G/DL (ref 11.7–15.7)
INR PPP: 1.12 (ref 0.86–1.14)
POTASSIUM SERPL-SCNC: 3.5 MMOL/L (ref 3.4–5.3)
SPECIMEN EXP DATE BLD: NORMAL

## 2017-06-20 PROCEDURE — 84132 ASSAY OF SERUM POTASSIUM: CPT | Performed by: ANESTHESIOLOGY

## 2017-06-20 PROCEDURE — 71000027 ZZH RECOVERY PHASE 2 EACH 15 MINS: Performed by: NEUROLOGICAL SURGERY

## 2017-06-20 PROCEDURE — 86900 BLOOD TYPING SEROLOGIC ABO: CPT | Performed by: ANESTHESIOLOGY

## 2017-06-20 PROCEDURE — 88300 SURGICAL PATH GROSS: CPT | Performed by: NEUROLOGICAL SURGERY

## 2017-06-20 PROCEDURE — C1767 GENERATOR, NEURO NON-RECHARG: HCPCS | Performed by: NEUROLOGICAL SURGERY

## 2017-06-20 PROCEDURE — 25000566 ZZH SEVOFLURANE, EA 15 MIN: Performed by: NEUROLOGICAL SURGERY

## 2017-06-20 PROCEDURE — 36000059 ZZH SURGERY LEVEL 3 EA 15 ADDTL MIN UMMC: Performed by: NEUROLOGICAL SURGERY

## 2017-06-20 PROCEDURE — 36000057 ZZH SURGERY LEVEL 3 1ST 30 MIN - UMMC: Performed by: NEUROLOGICAL SURGERY

## 2017-06-20 PROCEDURE — 86850 RBC ANTIBODY SCREEN: CPT | Performed by: ANESTHESIOLOGY

## 2017-06-20 PROCEDURE — 85730 THROMBOPLASTIN TIME PARTIAL: CPT | Performed by: ANESTHESIOLOGY

## 2017-06-20 PROCEDURE — S0077 INJECTION, CLINDAMYCIN PHOSP: HCPCS | Performed by: NEUROLOGICAL SURGERY

## 2017-06-20 PROCEDURE — 71000014 ZZH RECOVERY PHASE 1 LEVEL 2 FIRST HR: Performed by: NEUROLOGICAL SURGERY

## 2017-06-20 PROCEDURE — 86850 RBC ANTIBODY SCREEN: CPT | Performed by: NEUROLOGICAL SURGERY

## 2017-06-20 PROCEDURE — 37000009 ZZH ANESTHESIA TECHNICAL FEE, EACH ADDTL 15 MIN: Performed by: NEUROLOGICAL SURGERY

## 2017-06-20 PROCEDURE — 27210995 ZZH RX 272: Performed by: NEUROLOGICAL SURGERY

## 2017-06-20 PROCEDURE — 25000125 ZZHC RX 250: Performed by: NEUROLOGICAL SURGERY

## 2017-06-20 PROCEDURE — 86901 BLOOD TYPING SEROLOGIC RH(D): CPT | Performed by: ANESTHESIOLOGY

## 2017-06-20 PROCEDURE — 36415 COLL VENOUS BLD VENIPUNCTURE: CPT | Performed by: ANESTHESIOLOGY

## 2017-06-20 PROCEDURE — 86901 BLOOD TYPING SEROLOGIC RH(D): CPT | Performed by: NEUROLOGICAL SURGERY

## 2017-06-20 PROCEDURE — 25000128 H RX IP 250 OP 636: Performed by: ANESTHESIOLOGY

## 2017-06-20 PROCEDURE — 86900 BLOOD TYPING SEROLOGIC ABO: CPT | Performed by: NEUROLOGICAL SURGERY

## 2017-06-20 PROCEDURE — 25000128 H RX IP 250 OP 636: Performed by: NEUROLOGICAL SURGERY

## 2017-06-20 PROCEDURE — 25000125 ZZHC RX 250: Performed by: ANESTHESIOLOGY

## 2017-06-20 PROCEDURE — 85018 HEMOGLOBIN: CPT | Performed by: ANESTHESIOLOGY

## 2017-06-20 PROCEDURE — 37000008 ZZH ANESTHESIA TECHNICAL FEE, 1ST 30 MIN: Performed by: NEUROLOGICAL SURGERY

## 2017-06-20 PROCEDURE — 27210794 ZZH OR GENERAL SUPPLY STERILE: Performed by: NEUROLOGICAL SURGERY

## 2017-06-20 PROCEDURE — 40000170 ZZH STATISTIC PRE-PROCEDURE ASSESSMENT II: Performed by: NEUROLOGICAL SURGERY

## 2017-06-20 PROCEDURE — 85610 PROTHROMBIN TIME: CPT | Performed by: ANESTHESIOLOGY

## 2017-06-20 DEVICE — IMPLANTABLE DEVICE: Type: IMPLANTABLE DEVICE | Site: CHEST | Status: FUNCTIONAL

## 2017-06-20 RX ORDER — PROPOFOL 10 MG/ML
INJECTION, EMULSION INTRAVENOUS CONTINUOUS PRN
Status: DISCONTINUED | OUTPATIENT
Start: 2017-06-20 | End: 2017-06-20

## 2017-06-20 RX ORDER — ONDANSETRON 2 MG/ML
4 INJECTION INTRAMUSCULAR; INTRAVENOUS EVERY 30 MIN PRN
Status: DISCONTINUED | OUTPATIENT
Start: 2017-06-20 | End: 2017-06-20 | Stop reason: HOSPADM

## 2017-06-20 RX ORDER — FENTANYL CITRATE 50 UG/ML
INJECTION, SOLUTION INTRAMUSCULAR; INTRAVENOUS PRN
Status: DISCONTINUED | OUTPATIENT
Start: 2017-06-20 | End: 2017-06-20

## 2017-06-20 RX ORDER — SODIUM CHLORIDE, SODIUM LACTATE, POTASSIUM CHLORIDE, CALCIUM CHLORIDE 600; 310; 30; 20 MG/100ML; MG/100ML; MG/100ML; MG/100ML
INJECTION, SOLUTION INTRAVENOUS CONTINUOUS PRN
Status: DISCONTINUED | OUTPATIENT
Start: 2017-06-20 | End: 2017-06-20

## 2017-06-20 RX ORDER — LIDOCAINE HYDROCHLORIDE AND EPINEPHRINE 10; 10 MG/ML; UG/ML
INJECTION, SOLUTION INFILTRATION; PERINEURAL PRN
Status: DISCONTINUED | OUTPATIENT
Start: 2017-06-20 | End: 2017-06-20 | Stop reason: HOSPADM

## 2017-06-20 RX ORDER — DEXAMETHASONE SODIUM PHOSPHATE 4 MG/ML
INJECTION, SOLUTION INTRA-ARTICULAR; INTRALESIONAL; INTRAMUSCULAR; INTRAVENOUS; SOFT TISSUE PRN
Status: DISCONTINUED | OUTPATIENT
Start: 2017-06-20 | End: 2017-06-20

## 2017-06-20 RX ORDER — FENTANYL CITRATE 50 UG/ML
25-50 INJECTION, SOLUTION INTRAMUSCULAR; INTRAVENOUS
Status: DISCONTINUED | OUTPATIENT
Start: 2017-06-20 | End: 2017-06-20 | Stop reason: HOSPADM

## 2017-06-20 RX ORDER — LIDOCAINE HYDROCHLORIDE 20 MG/ML
INJECTION, SOLUTION INFILTRATION; PERINEURAL PRN
Status: DISCONTINUED | OUTPATIENT
Start: 2017-06-20 | End: 2017-06-20

## 2017-06-20 RX ORDER — ONDANSETRON 4 MG/1
4 TABLET, ORALLY DISINTEGRATING ORAL EVERY 30 MIN PRN
Status: DISCONTINUED | OUTPATIENT
Start: 2017-06-20 | End: 2017-06-20 | Stop reason: HOSPADM

## 2017-06-20 RX ORDER — CLINDAMYCIN PHOSPHATE 600 MG/50ML
10 INJECTION, SOLUTION INTRAVENOUS SEE ADMIN INSTRUCTIONS
Status: DISCONTINUED | OUTPATIENT
Start: 2017-06-20 | End: 2017-06-20 | Stop reason: HOSPADM

## 2017-06-20 RX ORDER — ONDANSETRON 2 MG/ML
INJECTION INTRAMUSCULAR; INTRAVENOUS PRN
Status: DISCONTINUED | OUTPATIENT
Start: 2017-06-20 | End: 2017-06-20

## 2017-06-20 RX ORDER — LIDOCAINE 40 MG/G
CREAM TOPICAL
Status: DISCONTINUED | OUTPATIENT
Start: 2017-06-20 | End: 2017-06-20 | Stop reason: HOSPADM

## 2017-06-20 RX ORDER — SODIUM CHLORIDE, SODIUM LACTATE, POTASSIUM CHLORIDE, CALCIUM CHLORIDE 600; 310; 30; 20 MG/100ML; MG/100ML; MG/100ML; MG/100ML
INJECTION, SOLUTION INTRAVENOUS CONTINUOUS
Status: DISCONTINUED | OUTPATIENT
Start: 2017-06-20 | End: 2017-06-20 | Stop reason: HOSPADM

## 2017-06-20 RX ORDER — PROPOFOL 10 MG/ML
INJECTION, EMULSION INTRAVENOUS PRN
Status: DISCONTINUED | OUTPATIENT
Start: 2017-06-20 | End: 2017-06-20

## 2017-06-20 RX ORDER — HYDROMORPHONE HYDROCHLORIDE 1 MG/ML
.3-.5 INJECTION, SOLUTION INTRAMUSCULAR; INTRAVENOUS; SUBCUTANEOUS EVERY 5 MIN PRN
Status: DISCONTINUED | OUTPATIENT
Start: 2017-06-20 | End: 2017-06-20 | Stop reason: HOSPADM

## 2017-06-20 RX ORDER — HYDRALAZINE HYDROCHLORIDE 20 MG/ML
2.5-5 INJECTION INTRAMUSCULAR; INTRAVENOUS EVERY 10 MIN PRN
Status: DISCONTINUED | OUTPATIENT
Start: 2017-06-20 | End: 2017-06-20 | Stop reason: HOSPADM

## 2017-06-20 RX ORDER — CLINDAMYCIN PHOSPHATE 600 MG/50ML
10 INJECTION, SOLUTION INTRAVENOUS
Status: COMPLETED | OUTPATIENT
Start: 2017-06-20 | End: 2017-06-20

## 2017-06-20 RX ADMIN — SODIUM CHLORIDE, POTASSIUM CHLORIDE, SODIUM LACTATE AND CALCIUM CHLORIDE: 600; 310; 30; 20 INJECTION, SOLUTION INTRAVENOUS at 08:10

## 2017-06-20 RX ADMIN — PROPOFOL 100 MCG/KG/MIN: 10 INJECTION, EMULSION INTRAVENOUS at 08:27

## 2017-06-20 RX ADMIN — PROPOFOL 30 MG: 10 INJECTION, EMULSION INTRAVENOUS at 08:49

## 2017-06-20 RX ADMIN — FENTANYL CITRATE 100 MCG: 50 INJECTION, SOLUTION INTRAMUSCULAR; INTRAVENOUS at 08:24

## 2017-06-20 RX ADMIN — CLINDAMYCIN IN 5 PERCENT DEXTROSE 600 MG: 12 INJECTION, SOLUTION INTRAVENOUS at 08:45

## 2017-06-20 RX ADMIN — DEXAMETHASONE SODIUM PHOSPHATE 4 MG: 4 INJECTION, SOLUTION INTRA-ARTICULAR; INTRALESIONAL; INTRAMUSCULAR; INTRAVENOUS; SOFT TISSUE at 08:30

## 2017-06-20 RX ADMIN — MIDAZOLAM HYDROCHLORIDE 2 MG: 1 INJECTION, SOLUTION INTRAMUSCULAR; INTRAVENOUS at 08:10

## 2017-06-20 RX ADMIN — FENTANYL CITRATE 50 MCG: 50 INJECTION, SOLUTION INTRAMUSCULAR; INTRAVENOUS at 09:16

## 2017-06-20 RX ADMIN — PHENYLEPHRINE HYDROCHLORIDE 100 MCG: 10 INJECTION, SOLUTION INTRAMUSCULAR; INTRAVENOUS; SUBCUTANEOUS at 08:24

## 2017-06-20 RX ADMIN — PROPOFOL 150 MG: 10 INJECTION, EMULSION INTRAVENOUS at 08:24

## 2017-06-20 RX ADMIN — PROPOFOL 50 MG: 10 INJECTION, EMULSION INTRAVENOUS at 08:30

## 2017-06-20 RX ADMIN — FENTANYL CITRATE 50 MCG: 50 INJECTION, SOLUTION INTRAMUSCULAR; INTRAVENOUS at 09:59

## 2017-06-20 RX ADMIN — LIDOCAINE HYDROCHLORIDE 60 MG: 20 INJECTION, SOLUTION INFILTRATION; PERINEURAL at 08:24

## 2017-06-20 RX ADMIN — ONDANSETRON 4 MG: 2 INJECTION INTRAMUSCULAR; INTRAVENOUS at 08:46

## 2017-06-20 NOTE — IP AVS SNAPSHOT
Post Anesthesia Care Unit [address] [address]    Phone:  [phone]                                       After Visit Summary   6/20/2017    Mili Mane    MRN: [id number]           After Visit Summary Signature Page     I have received my discharge instructions, and my questions have been answered. I have discussed any challenges I see with this plan with the nurse or doctor.    ..........................................................................................................................................  Patient/Patient Representative Signature      ..........................................................................................................................................  Patient Representative Print Name and Relationship to Patient    ..................................................               ................................................  Date                                            Time    ..........................................................................................................................................  Reviewed by Signature/Title    ...................................................              ..............................................  Date                                                            Time

## 2017-06-20 NOTE — OR NURSING
Sent box with new magnets home with patient. Pt eating and drinking. Neuros intact. Speaks slowly. Denies pain.

## 2017-06-20 NOTE — BRIEF OP NOTE
[facility], [place]    Brief Operative Note    Pre-operative diagnosis: Intractable Epilepsy With Complex Partial Seizures  Post-operative diagnosis Intractable Epilepsy with Complex partial seizures   Procedure: Procedure(s):  Vagus Nerve Stimulator Replacement   - Wound Class: I-Clean  Surgeon: Surgeon(s) and Role:     * Eliseo Nolen MD - Primary     * Efe Meadows MD - Resident - Assisting  Anesthesia: General   Estimated blood loss: 2 cc  Drains: None  Specimens:   ID Type Source Tests Collected by Time Destination   A : Vagus Nerve Generator Other (specify in comments) Axilla, Left SURGICAL PATHOLOGY EXAM Eliseo Nolen MD 6/20/2017  9:40 AM      Findings:   None.  Complications: None.  Implants: Stubmatic model 106, [place] [id number] implanted in the left axilla. Impedences tested and found to be functional.

## 2017-06-20 NOTE — DISCHARGE INSTRUCTIONS
[facility]  Same-Day Surgery   Adult Discharge Orders & Instructions     For 24 hours after surgery    1. Get plenty of rest.  A responsible adult must stay with you for at least 24 hours after you leave the hospital.   2. Do not drive or use heavy equipment.  If you have weakness or tingling, don't drive or use heavy equipment until this feeling goes away.  3. Do not drink alcohol.  4. Avoid strenuous or risky activities.  Ask for help when climbing stairs.   5. You may feel lightheaded.  IF so, sit for a few minutes before standing.  Have someone help you get up.   6. If you have nausea (feel sick to your stomach): Drink only clear liquids such as apple juice, ginger ale, broth or 7-Up.  Rest may also help.  Be sure to drink enough fluids.  Move to a regular diet as you feel able.  7. You may have a slight fever. Call the doctor if your fever is over 100 F (37.7 C) (taken under the tongue) or lasts longer than 24 hours.  8. You may have a dry mouth, a sore throat, muscle aches or trouble sleeping.  These should go away after 24 hours.  9. Do not make important or legal decisions.   Call your doctor for any of the followin.  Signs of infection (fever, growing tenderness at the surgery site, a large amount of drainage or bleeding, severe pain, foul-smelling drainage, redness, swelling).    2. It has been over 8 to 10 hours since surgery and you are still not able to urinate (pass water).    3.  Headache for over 24 hours.      To contact a doctor, call Dr. Nolen's office @ [phone] or Dr. Nolen's clinic @ [phone] or:    x   [phone] and ask for the resident on call for   Neurosurgery (answered 24 hours a day)  x   Emergency Department:    [facility]: [phone]       (TTY for hearing impaired: [phone])

## 2017-06-20 NOTE — ANESTHESIA CARE TRANSFER NOTE
Patient: Mili Mane    Procedure(s):  Vagus Nerve Stimulator Replacement   - Wound Class: I-Clean    Diagnosis: Intractable Epilepsy With Complex Partial Seizures  Diagnosis Additional Information: No value filed.    Anesthesia Type:   No value filed.     Note:  Airway :Nasal Cannula  Patient transferred to:PACU  Comments: LMA removed. Pt breathing spontaneously. Transferred to PACU with NC. Report to FLORIAN.     Guido Patricio  [id number]      Vitals: (Last set prior to Anesthesia Care Transfer)    CRNA VITALS  6/20/2017 0942 - 6/20/2017 1018      6/20/2017             Pulse: 79    SpO2: 94 %    Resp Rate (set): 10                Electronically Signed By: Guido Patricio MD  June 20, 2017  10:18 AM

## 2017-06-20 NOTE — ANESTHESIA POSTPROCEDURE EVALUATION
Patient: Mili Mane    Procedure(s):  Vagus Nerve Stimulator Replacement   - Wound Class: I-Clean    Diagnosis:Intractable Epilepsy With Complex Partial Seizures  Diagnosis Additional Information: No value filed.    Anesthesia Type:  General, LMA    Note:  Anesthesia Post Evaluation    Patient location during evaluation: PACU  Patient participation: Able to fully participate in evaluation  Level of consciousness: awake and alert  Pain management: adequate  Airway patency: patent  Cardiovascular status: acceptable  Respiratory status: acceptable  Hydration status: acceptable  PONV: none     Anesthetic complications: None          Last vitals:  Vitals:    06/20/17 1045 06/20/17 1053 06/20/17 1100   BP: 154/84 148/79 147/82   Resp: 14 15    Temp: 36.1  C (97  F) 36.2  C (97.1  F)    SpO2: 100% 100% 99%         Electronically Signed By: Wei Roper MD  June 20, 2017  11:07 AM

## 2017-06-20 NOTE — IP AVS SNAPSHOT
MRN:[id number]                      After Visit Summary   2017    Mili Mane    MRN: [id number]           Thank you!     Thank you for choosing [facility] for your care. Our goal is always to provide you with excellent care. Hearing back from our patients is one way we can continue to improve our services. Please take a few minutes to complete the written survey that you may receive in the mail after you visit with us. Thank you!        Patient Information     Date Of Birth          1965        About your hospital stay     You were admitted on:  2017 You last received care in the:  Post Anesthesia Care Unit [facility]    You were discharged on:  2017        Reason for your hospital stay       You were hospitalized for replacement and upgrade of your vagal nerve stimulator.                  Who to Call     For medical emergencies, please call [phone].  For non-urgent questions about your medical care, please call your primary care provider or clinic, [phone]  For questions related to your surgery, please call your surgery clinic        Attending Provider     Provider Specialty    Eliseo Nolen MD Neurosurgery       Primary Care Provider Office Phone # Fax #    Prakash Rider [phone]       When to contact your care team       Call if you have any of the followin. Temperature greater than 101.5 F.   2. Any redness, swelling or discharge from the wound.   3. Any new weakness, numbness or altered mental status.  4. Worsening pain that is not improving with the pain medications you were prescribed.     Call [phone] or after 5:00 pm or on weekends call [phone] and ask for the neurosurgery resident on call. Thank You.                  After Care Instructions     Activity       Your activity upon discharge: activity as tolerated and no driving for today            Diet       Follow this diet upon discharge: Regular             Wound care and dressings       Instructions to care for your wound at home: Wound care  - The bandage can be removed on post-operative day # 2 (Thursday 6/22/2017).  - You are ok to shower, but do not soak your incisions. Pat them dry if they get damp.   - No baths, hot tubs or pools for 4-6 weeks after surgery.   - You have steri-strips on the incision. They are ok to get wet and will dry up and flake off after several days.                  Follow-up Appointments     Adult [facility] Follow-up and recommended labs and tests       You underwent surgery place a replace a vagal nerve stimulator generator/battery  by Eliseo Nolen MD      - Your sutures are absorbable.   - You will have follow up scheduled with the physician assistants and/or nurse practitioners in our clinic 2 weeks after your surgery.     - If you have not heard from our clinic about your follow up visit by 3-4 days following your discharge, please call our clinic at [phone] to schedule an appointment with the Neurosurgery teams.     After discharge, your activity restrictions are:   - We encourage short frequent walks, increasing as tolerated.  - If you have had a seizure, you may not drive for at least 3 months according to [place] law.    - No strenuous activity.  - No lifting more than 10 pounds until you are seen in clinic (a gallon of milk weighs approximately 8 pounds)                  Your next 10 appointments already scheduled     Jul 07, 2017 11:30 AM CDT   (Arrive by 11:15 AM)   Return Seizure with Barney Kendall MD   [facility] ([facility])    [address] [place] [id number] [phone]              Further instructions from your care team       [facility]  Same-Day Surgery   Adult Discharge Orders & Instructions     For 24 hours after surgery    1. Get plenty of rest.  A responsible adult must stay with you for at least 24  hours after you leave the hospital.   2. Do not drive or use heavy equipment.  If you have weakness or tingling, don't drive or use heavy equipment until this feeling goes away.  3. Do not drink alcohol.  4. Avoid strenuous or risky activities.  Ask for help when climbing stairs.   5. You may feel lightheaded.  IF so, sit for a few minutes before standing.  Have someone help you get up.   6. If you have nausea (feel sick to your stomach): Drink only clear liquids such as apple juice, ginger ale, broth or 7-Up.  Rest may also help.  Be sure to drink enough fluids.  Move to a regular diet as you feel able.  7. You may have a slight fever. Call the doctor if your fever is over 100 F (37.7 C) (taken under the tongue) or lasts longer than 24 hours.  8. You may have a dry mouth, a sore throat, muscle aches or trouble sleeping.  These should go away after 24 hours.  9. Do not make important or legal decisions.   Call your doctor for any of the followin.  Signs of infection (fever, growing tenderness at the surgery site, a large amount of drainage or bleeding, severe pain, foul-smelling drainage, redness, swelling).    2. It has been over 8 to 10 hours since surgery and you are still not able to urinate (pass water).    3.  Headache for over 24 hours.      To contact a doctor, call Dr. Nolen's office @ [phone] or Dr. Nolen's clinic @ [phone] or:    x   [phone] and ask for the resident on call for   Neurosurgery (answered 24 hours a day)  x   Emergency Department:    [facility]: [phone]       (TTY for hearing impaired: [phone])              Pending Results     Date and Time Order Name Status Description    2017 0947 Surgical pathology exam In process             Admission Information     Date & Time Provider Department Dept. Phone    2017 Eliseo Nolen MD Post Anesthesia Care Unit [facility] [phone]      Your Vitals Were     Blood Pressure Temperature  "Respirations Height Weight Last Period    147 97.1  F (36.2  C) (Temporal) 15 1.651 m (5' 5\") 61.7 kg (136 lb 0.4 oz) 2017 (Exact Date)    Pulse Oximetry BMI (Body Mass Index)                99% 22.64 kg/m2          [employer] Information     [employer] lets you send messages to your doctor, view your test results, renew your prescriptions, schedule appointments and more. To sign up, go to [email or web address] . Click on \"Log in\" on the left side of the screen, which will take you to the Welcome page. Then click on \"Sign up Now\" on the right side of the page.     You will be asked to enter the access code listed below, as well as some personal information. Please follow the directions to create your username and password.     Your access code is: [email or web address]  Expires: 2017 10:38 AM     Your access code will  in 90 days. If you need help or a new code, please call your [facility] clinic or [phone].        Care EveryWhere ID     This is your Care EveryWhere ID. This could be used by other organizations to access your [facility] medical records  [id number]           Review of your medicines      CONTINUE these medicines which have NOT CHANGED        Dose / Directions    atenolol 25 MG tablet   Commonly known as:  TENORMIN   Used for:  Essential hypertension, benign, Partial symptomatic epilepsy with complex partial seizures, not intractable, without status epilepticus (H)        Dose:  25 mg   Take 1 tablet (25 mg) by mouth every morning   Refills:  0       CARBATROL 200 MG 12 hr capsule   Used for:  Unspecified convulsions (H)   Generic drug:  carBAMazepine        Take 2 tabs (400mg) by mouth in the AM, 3 tabs (600mg) in the afternoon, and 3 tabs (600mg) in the PM.   Quantity:  240 capsule   Refills:  1       LORazepam 1 MG tablet   Commonly known as:  ATIVAN   Used for:  Localization-related focal epilepsy with simple partial seizures (H)        Dose:  1 mg   Take 1 tablet (1 mg) by mouth 2 times " daily   Quantity:  60 tablet   Refills:  5       MULTI-VITAMINS Tabs        Dose:  1 tablet   Take 1 tablet by mouth every evening   Refills:  0         STOP taking     IBUPROFEN PO                    Protect others around you: Learn how to safely use, store and throw away your medicines at www.disposemymeds.org.             Medication List: This is a list of all your medications and when to take them. Check marks below indicate your daily home schedule. Keep this list as a reference.      Medications           Morning Afternoon Evening Bedtime As Needed    atenolol 25 MG tablet   Commonly known as:  TENORMIN   Take 1 tablet (25 mg) by mouth every morning                                CARBATROL 200 MG 12 hr capsule   Take 2 tabs (400mg) by mouth in the AM, 3 tabs (600mg) in the afternoon, and 3 tabs (600mg) in the PM.   Generic drug:  carBAMazepine                                LORazepam 1 MG tablet   Commonly known as:  ATIVAN   Take 1 tablet (1 mg) by mouth 2 times daily                                MULTI-VITAMINS Tabs   Take 1 tablet by mouth every evening

## 2017-06-21 LAB — COPATH REPORT: NORMAL

## 2017-06-22 NOTE — OP NOTE
DATE OF PROCEDURE:  6/20/2017      PREOPERATIVE DIAGNOSIS:  Intractable epilepsy with complex partial seizures status post vagal nerve stimulator placement.      POSTOPERATIVE DIAGNOSIS:  Intractable epilepsy with complex partial seizures status post vagal nerve stimulator placement.      PROCEDURE:  Vagus nerve stimulator generator replacement over the left lateral chest wall.      ATTENDING SURGEON:  Eliseo Nolen MD      RESIDENT SURGEON:  Efe Meadows MD, PhD      ANESTHESIA:  General.      ESTIMATED BLOOD LOSS:  2 mL.      COMPLICATIONS:  Nil.      INDICATIONS:  Ms. Mili Mane is a 52-year-old right-handed female with underlying epilepsy.  She has had a vagal nerve stimulator that was placed here at the [facility] by Dr. Kd Hdz in 2002 with subsequent replacement of her generator in 2010 by Dr. Retana.  Since that time, she has been doing well.  She follows with Dr. Kendall in the Department of Neurology here at the [facility] for epilepsy and vagal nerve stimulator programing.  It was recently noted that her generator was nearing the end of its life.  She saw Dr. Nolen in clinic and he has scheduled her for surgery.      DESCRIPTION OF PROCEDURE:  After obtaining informed consent, the patient was brought to the operating room and placed supine on the operating table.  Clindamycin was administered for surgical prophylaxis given the patient's penicillin allergy.  General anesthesia was induced and the patient was intubated and the previous incision over left lateral chest wall, axilla border, was identified.  The device was interrogated by the [employer] representative and the baseline settings were recorded at that time.  This area was marked and then prepped and draped in the standard sterile fashion.  The incision was infiltrated with 1% lidocaine with epinephrine 1:100,000 of 9.5 mL.  The timeout was then performed  identifying the patient, site and side of surgery, and procedure to be performed.  Following the timeout, scalpel was used to incise through the previous incision.  Monopolar cautery was used until the capsule surrounding the existing generator was found.  Then, using a combination of sharp and blunt dissection the capsule was opened and the previous generator was removed and inspected.  The subcutaneous pocket was then inspected for hemostasis.  A screwdriver was used to release the previous generator from the electrode.  The new generator was then brought onto the field.  It was connected via tightening the stay screws with the torque wrench to the existing electrode.  The patient was changed from a model 103 to model 106, which is slightly larger, so and using some blunt dissection, the pocket was increased in size a nominal amount, particularly in the medial aspect.  The distal loops of the redundant wiring were placed on the inferior and posterior aspect of the generator.  The wound was then closed in layers utilizing 3-0 inverted undyed Vicryl sutures for the reapproximation, then 4-0 monocryl was used for the subdermal closure and then benzoin and Steri-Strips were used on the skin, and a sterile Primapore dressing was placed over the incision.  All sponge and needle counts were correct.  The patient was extubated and awakened without incident and returned to the recovery room.      Dr. Nolen was present and scrubbed for the entirety of the procedure.         IGOR NOLEN MD       As dictated by JORDI FRANKEL MD            D: 2017 05:56   T: 2017 02:37   MT: lg      Name:     KELY JAMES   MRN:      [id number]        Account:        [id number]   :      1965           Procedure Date: 2017      Document: [id number]

## 2017-06-26 ENCOUNTER — TELEPHONE (OUTPATIENT)
Dept: NEUROSURGERY | Facility: CLINIC | Age: 52
End: 2017-06-26

## 2017-06-26 NOTE — TELEPHONE ENCOUNTER
"Post op VNS generator replacement.  Mili reports she is doing \"wonderfully\".  She reports she does not have pain.  Her incision still has steri-strips on that have not fallen off.  Her sutures are dissolvable.  She does not have any redness, swelling or drainage that she can see.  Her incision site is not overtly painful to touch nor fowl smelling.  She had not had any fevers.  She does not have any concerns at this time.  Confirmed access to neurosurgery contact numbers.  Scheduled for post op wound check with Sarah Thompson DNP.   "

## 2017-06-30 ENCOUNTER — OFFICE VISIT (OUTPATIENT)
Dept: NEUROSURGERY | Facility: CLINIC | Age: 52
End: 2017-06-30

## 2017-06-30 VITALS
BODY MASS INDEX: 22.66 KG/M2 | HEART RATE: 79 BPM | RESPIRATION RATE: 20 BRPM | DIASTOLIC BLOOD PRESSURE: 87 MMHG | SYSTOLIC BLOOD PRESSURE: 153 MMHG | HEIGHT: 65 IN | OXYGEN SATURATION: 97 % | TEMPERATURE: 97.1 F | WEIGHT: 136 LBS

## 2017-06-30 DIAGNOSIS — G40.211 PARTIAL SYMPTOMATIC EPILEPSY WITH COMPLEX PARTIAL SEIZURES, INTRACTABLE, WITH STATUS EPILEPTICUS (H): Primary | ICD-10-CM

## 2017-06-30 RX ORDER — MULTIVIT WITH MINERALS/LUTEIN
1 TABLET ORAL DAILY
Status: ON HOLD | COMMUNITY

## 2017-06-30 ASSESSMENT — PAIN SCALES - GENERAL: PAINLEVEL: NO PAIN (1)

## 2017-06-30 NOTE — LETTER
6/30/2017       RE: Mili Mane  [address]  [place] [address]     Dear Colleague,    Thank you for referring your patient, Mili Mane, to the [facility] NEUROSURGERY at [facility]. Please see a copy of my visit note below.    NEUROSURGERY POSTOP WOUND CHECK    Date of visit: 6/30/2017    Date of procedure: 6/20/2017  Pre-op diagnosis: Intractable epilepsy with complex partial seizures s/p VNS placement with failing generator  Procedure:  Vagus nerve stimulator (VNS) generator replacement over the left lateral chest wall  Surgeon: Eliseo Nolen MD     HPI:   Mili Mane is a pleasant 52 year old female now 10 days status post the above procedure for intractable epilepsy with complex partial seizures. The procedure itself was without incident. She recovered well and was discharged home in good condition.  Since then she presents for routine wound check. She has an appointment with her neurologist Dr. Kendall to follow up on her seizures and re-check the generator. The patient states that she has had 3 small seizures, her baseline, since the procedure. She is already on Carbaril. She denies fever, chills, redness, swelling, and drainage from the incision.       STATUS REPORT  Patient Supplied Answers To the UC Pain Questionnaire  UC Pain -  Patient Entered Questionnaire/Answers 6/30/2017   What number best describes your pain right now:  0 = No pain  to  10 = Worst pain imaginable 2   How would you describe the pain? dull, aching   Which of the following worsen your pain? nothing worsens the pain   Which of the following improve or reduce your pain?  walking, relaxation   What number best describes your average pain for the past week:  0 = No pain  to  10 = Worst pain imaginable 1   What number best describes your LOWEST pain in past 24 hours:  0 = No pain  to  10 = Worst pain imaginable 1   What number best describes your WORST pain in past 24 hours:   "0 = No pain  to  10 = Worst pain imaginable 1   When is your pain worst? AM   What non-medicine treatments have you already had for your pain? exercise   Have you tried treating your pain with medication?  No   Are you currently taking medications for your pain? No     Current Medications:     Current Outpatient Prescriptions:      Multiple Vitamins-Minerals (CENTRUM SILVER) per tablet, Take 1 tablet by mouth daily, Disp: , Rfl:      atenolol (TENORMIN) 25 MG tablet, Take 25 mg by mouth daily , Disp: , Rfl:      CARBATROL 200 MG 12 hr capsule, Take 2 tabs (400mg) by mouth in the AM, 3 tabs (600mg) in the afternoon, and 3 tabs (600mg) in the PM., Disp: 240 capsule, Rfl: 1     LORazepam (ATIVAN) 1 MG tablet, Take 1 tablet (1 mg) by mouth 2 times daily, Disp: 60 tablet, Rfl: 5    Allergies:  Allergies   Allergen Reactions     Fish Allergy      Nuts      Other [Seasonal Allergies]      Seizure medication     Penicillins      Pollen Extract/Tree Extract        PMH, SOC Hx, FAM Hx, PROBLEM LIST:  All reviewed in EPIC.    Focus Examination:  /87 (BP Location: Right arm, Patient Position: Sitting, Cuff Size: Adult Regular)  Pulse 79  Temp 97.1  F (36.2  C)  Resp 20  Ht 1.651 m (5' 5\")  Wt 61.7 kg (136 lb)  LMP 05/06/2017 (Exact Date)  SpO2 97%  Breastfeeding? No  BMI 22.63 kg/m2  Well developed well nourished female found seated comfortably in exam chair.  No apparent distress. She is accompanied.  A&O X3.  Mood and affect WNL. Language and fund of knowledge intact.  Is able to sit and rise independently.   She has a nicely healed incision in the left lateral chest wall.     Assessment:  1. Partial symptomatic epilepsy with complex partial seizures, intractable, with status epilepticus (H)    2.      S/p VNS generator replacement.  No evidence of wound infection.    Plan:  We discussed wound care.  *  Keep it open to air.   *  May shower  including the incision. No rubbing or scrubbing.   *  May swim or bathe " when all scabbing is gone from the incision.  We discussed medication.    *  Patient to discuss anticonvulsants and VNS programming with her neurologist, Dr. Kendall.  We discussed follow up  *  She does not have a specific return appointment with Dr. Nolen. Patient was instructed to call Dr. Nolen's RN should the need arise.  *  The patient has our contact information and is aware that she should call if she has questions comments or concerns.     We appreciate the opportunity to be of service in the care of this pleasant patient.  Please do call if there is anything more we can do.    Again, thank you for allowing me to participate in the care of your patient.      Sincerely,    MALCOLM Ortiz CNP

## 2017-06-30 NOTE — PROGRESS NOTES
NEUROSURGERY POSTOP WOUND CHECK    Date of visit: 6/30/2017    Date of procedure: 6/20/2017  Pre-op diagnosis: Intractable epilepsy with complex partial seizures s/p VNS placement with failing generator  Procedure:  Vagus nerve stimulator (VNS) generator replacement over the left lateral chest wall  Surgeon: Eliseo Nolen MD     HPI:   Mili Mane is a pleasant 52 year old female now 10 days status post the above procedure for intractable epilepsy with complex partial seizures. The procedure itself was without incident. She recovered well and was discharged home in good condition.  Since then she presents for routine wound check. She has an appointment with her neurologist Dr. Kendall to follow up on her seizures and re-check the generator. The patient states that she has had 3 small seizures, her baseline, since the procedure. She is already on Carbaril. She denies fever, chills, redness, swelling, and drainage from the incision.       STATUS REPORT  Patient Supplied Answers To the UC Pain Questionnaire  UC Pain -  Patient Entered Questionnaire/Answers 6/30/2017   What number best describes your pain right now:  0 = No pain  to  10 = Worst pain imaginable 2   How would you describe the pain? dull, aching   Which of the following worsen your pain? nothing worsens the pain   Which of the following improve or reduce your pain?  walking, relaxation   What number best describes your average pain for the past week:  0 = No pain  to  10 = Worst pain imaginable 1   What number best describes your LOWEST pain in past 24 hours:  0 = No pain  to  10 = Worst pain imaginable 1   What number best describes your WORST pain in past 24 hours:  0 = No pain  to  10 = Worst pain imaginable 1   When is your pain worst? AM   What non-medicine treatments have you already had for your pain? exercise   Have you tried treating your pain with medication?  No   Are you currently taking medications for your pain? No     Current  "Medications:     Current Outpatient Prescriptions:      Multiple Vitamins-Minerals (CENTRUM SILVER) per tablet, Take 1 tablet by mouth daily, Disp: , Rfl:      atenolol (TENORMIN) 25 MG tablet, Take 25 mg by mouth daily , Disp: , Rfl:      CARBATROL 200 MG 12 hr capsule, Take 2 tabs (400mg) by mouth in the AM, 3 tabs (600mg) in the afternoon, and 3 tabs (600mg) in the PM., Disp: 240 capsule, Rfl: 1     LORazepam (ATIVAN) 1 MG tablet, Take 1 tablet (1 mg) by mouth 2 times daily, Disp: 60 tablet, Rfl: 5    Allergies:  Allergies   Allergen Reactions     Fish Allergy      Nuts      Other [Seasonal Allergies]      Seizure medication     Penicillins      Pollen Extract/Tree Extract        PMH, SOC Hx, FAM Hx, PROBLEM LIST:  All reviewed in EPIC.    Focus Examination:  /87 (BP Location: Right arm, Patient Position: Sitting, Cuff Size: Adult Regular)  Pulse 79  Temp 97.1  F (36.2  C)  Resp 20  Ht 1.651 m (5' 5\")  Wt 61.7 kg (136 lb)  LMP 05/06/2017 (Exact Date)  SpO2 97%  Breastfeeding? No  BMI 22.63 kg/m2  Well developed well nourished female found seated comfortably in exam chair.  No apparent distress. She is accompanied.  A&O X3.  Mood and affect WNL. Language and fund of knowledge intact.  Is able to sit and rise independently.   She has a nicely healed incision in the left lateral chest wall.     Assessment:  1. Partial symptomatic epilepsy with complex partial seizures, intractable, with status epilepticus (H)    2.      S/p VNS generator replacement.  No evidence of wound infection.    Plan:  We discussed wound care.  *  Keep it open to air.   *  May shower  including the incision. No rubbing or scrubbing.   *  May swim or bathe when all scabbing is gone from the incision.  We discussed medication.    *  Patient to discuss anticonvulsants and VNS programming with her neurologist, Dr. Kendall.  We discussed follow up  *  She does not have a specific return appointment with Dr. Nolen. Patient was instructed " to call Dr. Nolen's RN should the need arise.  *  The patient has our contact information and is aware that she should call if she has questions comments or concerns.     We appreciate the opportunity to be of service in the care of this pleasant patient.  Please do call if there is anything more we can do.      Sarah Thompson, GÓMEZ, APRN, FNP-[facility]  Department of Neurosurgery  Phone: [phone]  Fax: [phone]

## 2017-06-30 NOTE — MR AVS SNAPSHOT
After Visit Summary   2017    Mili Mane    MRN: [id number]           Patient Information     Date Of Birth          1965        Visit Information        Provider Department      2017 10:30 AM Sarah Thompson APRN [facility] Neurosurgery        Today's Diagnoses     Partial symptomatic epilepsy with complex partial seizures, intractable, with status epilepticus (H)    -  1       Follow-ups after your visit        Your next 10 appointments already scheduled     2017 11:30 AM CDT   (Arrive by 11:15 AM)   Return Seizure with Barney Kendall MD   [facility] Neurology ([facility])    [address] [place] [address] [phone]              Who to contact     Please call your clinic at [phone] to:    Ask questions about your health    Make or cancel appointments    Discuss your medicines    Learn about your test results    Speak to your doctor   If you have compliments or concerns about an experience at your clinic, or if you wish to file a complaint, please contact [facility] Patient Relations at [phone] or email us at [email or web address]         Additional Information About Your Visit        MyChart Information     ClaimItt is an electronic gateway that provides easy, online access to your medical records. With InsightSquared, you can request a clinic appointment, read your test results, renew a prescription or communicate with your care team.     To sign up for ClaimItt visit the website at [email or web address]   You will be asked to enter the access code listed below, as well as some personal information. Please follow the directions to create your username and password.     Your access code is: [email or web address]  Expires: 2017 12:54 PM     Your access code will  in 90 days. If you need help or a new code, please contact your [facility]  "Clinic or call [phone] for assistance.        Care EveryWhere ID     This is your Care EveryWhere ID. This could be used by other organizations to access your [place] medical records  [id number]        Your Vitals Were     Pulse Temperature Respirations Height Last Period Pulse Oximetry    79 97.1  F (36.2  C) 20 1.651 m (5' 5\") 05/06/2017 (Exact Date) 97%    Breastfeeding? BMI (Body Mass Index)                No 22.63 kg/m2           Blood Pressure from Last 3 Encounters:   06/30/17 153/87   06/20/17 138/76   06/15/17 (!) 134/94    Weight from Last 3 Encounters:   06/30/17 61.7 kg (136 lb)   06/20/17 61.7 kg (136 lb 0.4 oz)   06/10/17 61.8 kg (136 lb 4.8 oz)              Today, you had the following     No orders found for display         Today's Medication Changes          These changes are accurate as of: 6/30/17 12:54 PM.  If you have any questions, ask your nurse or doctor.               Stop taking these medicines if you haven't already. Please contact your care team if you have questions.     MULTI-VITAMINS Tabs   Stopped by:  Sarah Thompson APRN CNP                    Primary Care Provider Office Phone # Fax #    Prakash Rider [phone]       [facility] [address] [address]        Equal Access to Services     [facility] Center[facility]: Hadii edith ku hadasho Somireyaali, waaxda luqadaha, qaybta kaalmada davonteyada, remy unger . So [facility] [phone].    ATENCIÓN: Si habla español, tiene a silva disposición servicios gratuitos de asistencia lingüística. Alfred al [phone].    We comply with applicable federal civil rights laws and [place] laws. We do not discriminate on the basis of race, color, national origin, age, disability sex, sexual orientation or gender identity.            Thank you!     Thank you for choosing [facility]  for your care. Our goal is always to provide you with excellent care. Hearing back from " our patients is one way we can continue to improve our services. Please take a few minutes to complete the written survey that you may receive in the mail after your visit with us. Thank you!             Your Updated Medication List - Protect others around you: Learn how to safely use, store and throw away your medicines at www.disposemymeds.org.          This list is accurate as of: 6/30/17 12:54 PM.  Always use your most recent med list.                   Brand Name Dispense Instructions for use Diagnosis    atenolol 25 MG tablet    TENORMIN     Take 25 mg by mouth daily    Essential hypertension, benign, Partial symptomatic epilepsy with complex partial seizures, not intractable, without status epilepticus (H)       CARBATROL 200 MG 12 hr capsule   Generic drug:  carBAMazepine     240 capsule    Take 2 tabs (400mg) by mouth in the AM, 3 tabs (600mg) in the afternoon, and 3 tabs (600mg) in the PM.    Unspecified convulsions (H)       CENTRUM SILVER per tablet      Take 1 tablet by mouth daily        LORazepam 1 MG tablet    ATIVAN    60 tablet    Take 1 tablet (1 mg) by mouth 2 times daily    Localization-related focal epilepsy with simple partial seizures (H)

## 2017-06-30 NOTE — NURSING NOTE
Chief Complaint   Patient presents with     RECHECK     UMP- VNS POST-OP, WOUND CHECK     Torres Bailey, CMA

## 2017-07-07 ENCOUNTER — OFFICE VISIT (OUTPATIENT)
Dept: NEUROLOGY | Facility: CLINIC | Age: 52
End: 2017-07-07

## 2017-07-07 ENCOUNTER — TELEPHONE (OUTPATIENT)
Dept: NEUROLOGY | Facility: CLINIC | Age: 52
End: 2017-07-07

## 2017-07-07 VITALS
HEART RATE: 79 BPM | HEIGHT: 65 IN | BODY MASS INDEX: 22.66 KG/M2 | SYSTOLIC BLOOD PRESSURE: 152 MMHG | DIASTOLIC BLOOD PRESSURE: 84 MMHG | WEIGHT: 136.02 LBS

## 2017-07-07 DIAGNOSIS — R56.9 UNSPECIFIED CONVULSIONS (H): ICD-10-CM

## 2017-07-07 DIAGNOSIS — R56.9 UNSPECIFIED CONVULSIONS (H): Primary | ICD-10-CM

## 2017-07-07 LAB — CARBAMAZEPINE SERPL-MCNC: 13.6 MG/L (ref 4–12)

## 2017-07-07 RX ORDER — CARBAMAZEPINE 200 MG/1
CAPSULE, EXTENDED RELEASE ORAL
Qty: 240 CAPSULE | Refills: 11 | Status: SHIPPED | OUTPATIENT
Start: 2017-07-07 | End: 2017-08-29

## 2017-07-07 ASSESSMENT — PAIN SCALES - GENERAL: PAINLEVEL: NO PAIN (0)

## 2017-07-07 NOTE — MR AVS SNAPSHOT
After Visit Summary   7/7/2017    Mili Mane    MRN: [id number]           Patient Information     Date Of Birth          1965        Visit Information        Provider Department      7/7/2017 11:30 AM Barney Kendall MD [facility] Neurology        Today's Diagnoses     Unspecified convulsions (H)           Follow-ups after your visit        Follow-up notes from your care team     Return in about 4 weeks (around 8/4/2017).      Your next 10 appointments already scheduled     Jul 07, 2017 12:30 PM CDT   LAB with  LAB   [facility] Lab ([facility])    [address] [place] [address] [phone]           Patient must bring picture ID.  Patient should be prepared to give a urine specimen  Please do not eat 10-12 hours before your appointment if you are coming in fasting for labs on lipids, cholesterol, or glucose (sugar).  Pregnant women should follow their Care Team instructions. Water with medications is okay. Do not drink coffee or other fluids.   If you have concerns about taking  your medications, please ask at office or if scheduling via [email or web address], send a message by clicking on Secure Messaging, Message Your Care Team.            Aug 29, 2017 10:00 AM CDT   (Arrive by 9:45 AM)   Return Seizure with Barney Kendall MD   [facility] Neurology ([facility])    [address] [place] [address] [phone]              Future tests that were ordered for you today     Open Future Orders        Priority Expected Expires Ordered    Carbamazepine and 10 11 epoxide Routine  7/7/2018 7/7/2017    Carbamazepine and epoxide free and total Routine  7/7/2018 7/7/2017    Carbamazepine total Routine  7/7/2018 7/7/2017    Carbamazepine total Routine  7/7/2018 7/7/2017            Who to contact     Please call your clinic at [phone] to:    Ask questions about your health    Make or cancel  "appointments    Discuss your medicines    Learn about your test results    Speak to your doctor   If you have compliments or concerns about an experience at your clinic, or if you wish to file a complaint, please contact [facility] Patient Relations at [phone] or email us at [email or web address]         Additional Information About Your Visit        SafeTacMaghart Information     DirectPhotonics Industriest is an electronic gateway that provides easy, online access to your medical records. With Andean Designs, you can request a clinic appointment, read your test results, renew a prescription or communicate with your care team.     To sign up for Andean Designs visit the website at [email or web address]   You will be asked to enter the access code listed below, as well as some personal information. Please follow the directions to create your username and password.     Your access code is: [email or web address]  Expires: 2017 12:54 PM     Your access code will  in 90 days. If you need help or a new code, please contact your [facility] Clinic or call [phone] for assistance.        Care EveryWhere ID     This is your Care EveryWhere ID. This could be used by other organizations to access your [place] medical records  [id number]        Your Vitals Were     Pulse Height BMI (Body Mass Index)             79 1.651 m (5' 5\") 22.64 kg/m2          Blood Pressure from Last 3 Encounters:   17 152/84   17 153/87   17 138/76    Weight from Last 3 Encounters:   17 61.7 kg (136 lb 0.4 oz)   17 61.7 kg (136 lb)   17 61.7 kg (136 lb 0.4 oz)                 Where to get your medicines      These medications were sent to [facility] [address] [place] [address]     Phone:  [phone]     CARBATROL 200 MG 12 hr capsule          Primary Care Provider Office Phone # Fax #    Prakash Rider MD " [phone]       [facility] [address] [address]        Equal Access to Services     STACY WHELAN : Hadmckenzie aad ku hadinocente Dixon, leigh noriisidra, godfrey kaeliseo everett, remy grahamgay melissa. So [facility] [phone].    ATENCIÓN: Si habla español, tiene a silva disposición servicios gratuitos de asistencia lingüística. Llame al [phone].    We comply with applicable federal civil rights laws and [place] laws. We do not discriminate on the basis of race, color, national origin, age, disability sex, sexual orientation or gender identity.            Thank you!     Thank you for choosing [facility] NEUROLOGY  for your care. Our goal is always to provide you with excellent care. Hearing back from our patients is one way we can continue to improve our services. Please take a few minutes to complete the written survey that you may receive in the mail after your visit with us. Thank you!             Your Updated Medication List - Protect others around you: Learn how to safely use, store and throw away your medicines at www.disposemymeds.org.          This list is accurate as of: 7/7/17 12:16 PM.  Always use your most recent med list.                   Brand Name Dispense Instructions for use Diagnosis    atenolol 25 MG tablet    TENORMIN     Take 25 mg by mouth daily    Essential hypertension, benign, Partial symptomatic epilepsy with complex partial seizures, not intractable, without status epilepticus (H)       CARBATROL 200 MG 12 hr capsule   Generic drug:  carBAMazepine     240 capsule    Take 2 tabs (400mg) by mouth in the AM, 3 tabs (600mg) in the afternoon, and 3 tabs (600mg) in the PM.    Unspecified convulsions (H)       CENTRUM SILVER per tablet      Take 1 tablet by mouth daily        LORazepam 1 MG tablet    ATIVAN    60 tablet    Take 1 tablet (1 mg) by mouth 2 times daily    Localization-related focal epilepsy with simple partial seizures  (H)

## 2017-07-07 NOTE — PROGRESS NOTES
52 yrs ol w fem with intractable sp sz and cp sz with recent thomas of vns generator and newer model on 6/20.  Sz freq about the same. Sister with her. Cardiac issues on work up and echo suggested chf but normal coronary on ct angio MRI requsted recently did not got done cu..  Interrogated vns today and set parameters as previous. New magnet given. See flow sheet for parameters  With new vns will be more able to adjust on next visit and max sz control which has been suboptimal and patients refuses new meds. Level done and refill fo cbz done (carbatrol)  Exam--unchanged. vns set as before.  Will see in 4 weeks an adjust further.  Fiol

## 2017-07-07 NOTE — NURSING NOTE
Chief Complaint   Patient presents with     RECHECK     UMP RETURN - SEIZURE     Marilynn Mckinley MA

## 2017-07-07 NOTE — LETTER
7/7/2017       RE: Mili Mane  [address]  [place] [address]     Dear Colleague,    Thank you for referring your patient, Mili Mane, to the [facility] NEUROLOGY at [facility]. Please see a copy of my visit note below.    52 yrs ol w fem with intractable sp sz and cp sz with recent thomas of vns generator and newer model on 6/20.  Sz freq about the same. Sister with her. Cardiac issues on work up and echo suggested chf but normal coronary on ct angio MRI requsted recently did not got done cu..  Interrogated vns today and set parameters as previous. New magnet given. See flow sheet for parameters  With new vns will be more able to adjust on next visit and max sz control which has been suboptimal and patients refuses new meds. Level done and refill fo cbz done (carbatrol)  Exam--unchanged. vns set as before.  Will see in 4 weeks an adjust further.  Enoch    Again, thank you for allowing me to participate in the care of your patient.      Sincerely,    Barney Kendall MD

## 2017-07-08 LAB
CARBAMAZEPINE EP FREE SERPL-MCNC: 1.1 UG/ML
CARBAMAZEPINE EP SERPL-MCNC: 2.6 UG/ML
CARBAMAZEPINE EP SERPL-MCNC: 2.9 UG/ML
CARBAMAZEPINE FREE SERPL-MCNC: 2.3 UG/ML
CARBAMAZEPINE SERPL-MCNC: 11 UG/ML
CARBAMAZEPINE SERPL-MCNC: 12.5 UG/ML

## 2017-07-10 ENCOUNTER — TELEPHONE (OUTPATIENT)
Dept: NEUROLOGY | Facility: CLINIC | Age: 52
End: 2017-07-10

## 2017-07-11 NOTE — TELEPHONE ENCOUNTER
Prior Authorization Approval    Authorization Effective Date: 6/11/2017  Authorization Expiration Date: 7/11/2018  Medication: CARBATROL 200 MG 12 hr capsule - APPROVED  Approved Dose/Quantity: 240 PER 30 DAYS  Reference #:     Insurance Company: [employer] - Phone [phone] Fax [phone]  Expected CoPay: $3.70     CoPay Card Available:      Foundation Assistance Needed:    Which Pharmacy is filling the prescription (Not needed for infusion/clinic administered): [facility] [address]  Pharmacy Notified: Yes  Patient Notified: Yes

## 2017-07-11 NOTE — TELEPHONE ENCOUNTER
[facility] Prior Authorization Team   Phone: [phone]  Fax: [phone]      PA Initiation    Medication: CARBATROL 200 MG 12 hr capsule  Insurance Company: [employer] - Phone [phone] Fax [phone]  Pharmacy Filling the Rx: [facility] [address]  Filling Pharmacy Phone: [phone]  Filling Pharmacy Fax:    Start Date: 7/11/2017

## 2017-08-29 ENCOUNTER — OFFICE VISIT (OUTPATIENT)
Dept: NEUROLOGY | Facility: CLINIC | Age: 52
End: 2017-08-29

## 2017-08-29 VITALS
HEIGHT: 65 IN | TEMPERATURE: 98.1 F | SYSTOLIC BLOOD PRESSURE: 139 MMHG | RESPIRATION RATE: 16 BRPM | OXYGEN SATURATION: 98 % | HEART RATE: 83 BPM | WEIGHT: 137 LBS | BODY MASS INDEX: 22.82 KG/M2 | DIASTOLIC BLOOD PRESSURE: 82 MMHG

## 2017-08-29 DIAGNOSIS — R56.9 UNSPECIFIED CONVULSIONS (H): ICD-10-CM

## 2017-08-29 LAB — CARBAMAZEPINE SERPL-MCNC: 16.5 MG/L (ref 4–12)

## 2017-08-29 RX ORDER — CARBAMAZEPINE 200 MG/1
CAPSULE, EXTENDED RELEASE ORAL
Qty: 240 CAPSULE | Refills: 11 | Status: SHIPPED | OUTPATIENT
Start: 2017-08-29 | End: 2017-08-31

## 2017-08-29 ASSESSMENT — PAIN SCALES - GENERAL: PAINLEVEL: NO PAIN (0)

## 2017-08-29 NOTE — MR AVS SNAPSHOT
After Visit Summary   8/29/2017    Mili Mane    MRN: [id number]           Patient Information     Date Of Birth          1965        Visit Information        Provider Department      8/29/2017 10:00 AM Barney Kendall MD [facility] Neurology        Today's Diagnoses     Unspecified convulsions (H)           Follow-ups after your visit        Follow-up notes from your care team     Return in about 3 months (around 11/29/2017).      Your next 10 appointments already scheduled     Aug 29, 2017 10:45 AM CDT   LAB with  LAB   [facility] Lab ([facility])    [address] [place] [address] [phone]           Patient must bring picture ID. Patient should be prepared to give a urine specimen  Please do not eat 10-12 hours before your appointment if you are coming in fasting for labs on lipids, cholesterol, or glucose (sugar). Pregnant women should follow their Care Team instructions. Water with medications is okay. Do not drink coffee or other fluids. If you have concerns about taking  your medications, please ask at office or if scheduling via HeartThis, send a message by clicking on Secure Messaging, Message Your Care Team.            Dec 05, 2017 10:00 AM CST   (Arrive by 9:45 AM)   Return Seizure with Barney Kendall MD   [facility] Neurology ([facility])    [address] [place] [address] [phone]              Future tests that were ordered for you today     Open Future Orders        Priority Expected Expires Ordered    Carbamazepine total Routine  8/29/2018 8/29/2017            Who to contact     Please call your clinic at [phone] to:    Ask questions about your health    Make or cancel appointments    Discuss your medicines    Learn about your test results    Speak to your doctor   If you have compliments or concerns about an experience at your clinic, or if you wish to file a  "complaint, please contact [facility] Patient Relations at [phone] or email us at [email or web address]         Additional Information About Your Visit        WillKinn Media Information     WillKinn Media is an electronic gateway that provides easy, online access to your medical records. With WillKinn [employer], you can request a clinic appointment, read your test results, renew a prescription or communicate with your care team.     To sign up for WillKinn [employer] visit the website at [email or web address]   You will be asked to enter the access code listed below, as well as some personal information. Please follow the directions to create your username and password.     Your access code is: [email or web address]  Expires: 2017 12:54 PM     Your access code will  in 90 days. If you need help or a new code, please contact your [facility] or call [phone] for assistance.        Care EveryWhere ID     This is your Care EveryWhere ID. This could be used by other organizations to access your [place] medical records  [id number]        Your Vitals Were     Pulse Temperature Respirations Height Pulse Oximetry Breastfeeding?    83 98.1  F (36.7  C) 16 1.651 m (5' 5\") 98% No    BMI (Body Mass Index)                   22.8 kg/m2            Blood Pressure from Last 3 Encounters:   17 139/82   17 152/84   17 153/87    Weight from Last 3 Encounters:   17 62.1 kg (137 lb)   17 61.7 kg (136 lb 0.4 oz)   17 61.7 kg (136 lb)                 Where to get your medicines      These medications were sent to [facility] [address] [facility] [address] [facility]  [address] [facility] [address]     Phone:  [phone]     CARBATROL 200 MG 12 hr capsule          Primary Care Provider Office Phone # Fax #    Prakash Rider [phone]       [place] [address] [facility] [address]        Equal " Access to Services     [facility]: Hadii aad ku hadjessiedereck Mosesali, waleigh annda luqisidra, qakatie claritzajustinremy deleon. So [facility] [phone].    ATENCIÓN: Si habla sybilañol, tiene a silva disposición servicios gratuitos de asistencia lingüística. Llame al [phone].    We comply with applicable federal civil rights laws and [place] laws. We do not discriminate on the basis of race, color, national origin, age, disability sex, sexual orientation or gender identity.            Thank you!     Thank you for choosing [facility] NEUROLOGY  for your care. Our goal is always to provide you with excellent care. Hearing back from our patients is one way we can continue to improve our services. Please take a few minutes to complete the written survey that you may receive in the mail after your visit with us. Thank you!             Your Updated Medication List - Protect others around you: Learn how to safely use, store and throw away your medicines at www.disposemymeds.org.          This list is accurate as of: 8/29/17 10:33 AM.  Always use your most recent med list.                   Brand Name Dispense Instructions for use Diagnosis    atenolol 25 MG tablet    TENORMIN     Take 25 mg by mouth daily    Essential hypertension, benign, Partial symptomatic epilepsy with complex partial seizures, not intractable, without status epilepticus (H)       CARBATROL 200 MG 12 hr capsule   Generic drug:  carBAMazepine     240 capsule    Take 2 tabs (400mg) by mouth in the AM, 3 tabs (600mg) in the afternoon, and 3 tabs (600mg) in the PM.    Unspecified convulsions (H)       CENTRUM SILVER per tablet      Take 1 tablet by mouth daily        LORazepam 1 MG tablet    ATIVAN    60 tablet    Take 1 tablet (1 mg) by mouth 2 times daily    Localization-related focal epilepsy with simple partial seizures (H)

## 2017-08-29 NOTE — PROGRESS NOTES
2017      Prakash Rider MD   [facility]    [address] [place] [address]      RE: Mili Mane   MRN: [id number]   : 1965      Dear Dr. Rider:      Ms. 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      The mother had  recently and her sister has been coming regularly to her  appointments.      In her last visit we ramped up her VNS and now she was interrogated again with a new battery on and it is doing very well with an output current 1.25 milliamps and cycling every 5 minutes.  She has not been tolerant to more current output in the past.      She is still having the same amount of simple partial seizures as previous visits.  Her medication consists of Carbatrol 400 in the morning, 600 in the afternoon and 600 in the evening with her last concentration on her previous visit in July was 12.5.  Her concentrations have been ranging around that time, but her epoxide site level the last time was only 2.6.      She returns with his sister today, reports the same amount of seizures.  She uses the magnet frequently.      PHYSICAL EXAMINATION:   GENERAL:  She is a pleasant woman.     VITAL SIGNS:   The patient seems to have lost some weight and today she weighed 62.1 kilo; previously, she had really weighed the same 61.7.  Pulse is 83.  Blood pressure 139/82.     NEUROLOGIC:   Eye movements show no significant nystagmus.      The VNS I said was interrogated and the output is 1.25 and she and I discussed options.  Medical marijuana remains an option for her and we explained the indications and so forth and usage.  The other option would be Briviact and this information was given also to her.  They are going to explore medical cannabis program.      The VNS changes do not seem to be want to be paid by the insurance and will be looking into that as well.  We will see her in 3 months and continue to pursue a  more aggressive program of no anticonvulsant.      Sincerely,       MD ALDEN Carrera MD             D: 2017 10:43   T: 2017 11:22   MT: ROSA MARIA      Name:     KELY JAMES   MRN:      [id number]        Account:      [id number]   :      1965           Service Date: 2017      Document: [id number]

## 2017-08-29 NOTE — NURSING NOTE
Chief Complaint   Patient presents with     RECHECK     UMP- SEIZURE DISORDER F/U     Torres Bailey, CMA

## 2017-08-29 NOTE — LETTER
2017     RE: Mili Mane  [address]  [place] [address]     Dear Colleague,    Thank you for referring your patient, Mili Mane, to the [facility] NEUROLOGY at [facility] acute [facility]. Please see a copy of my visit note below.    2017      Prakash Rider MD   [facility]    [address] [place] [address]      RE: Mili Mane   MRN: [id number]   : 1965      Dear Dr. Rider:      Ms. 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      The mother had  recently and her sister has been coming regularly to her  appointments.      In her last visit we ramped up her VNS and now she was interrogated again with a new battery on and it is doing very well with an output current 1.25 milliamps and cycling every 5 minutes.  She has not been tolerant to more current output in the past.      She is still having the same amount of simple partial seizures as previous visits.  Her medication consists of Carbatrol 400 in the morning, 600 in the afternoon and 600 in the evening with her last concentration on her previous visit in July was 12.5.  Her concentrations have been ranging around that time, but her epoxide site level the last time was only 2.6.      She returns with his sister today, reports the same amount of seizures.  She uses the magnet frequently.      PHYSICAL EXAMINATION:   GENERAL:  She is a pleasant woman.     VITAL SIGNS:   The patient seems to have lost some weight and today she weighed 62.1 kilo; previously, she had really weighed the same 61.7.  Pulse is 83.  Blood pressure 139/82.     NEUROLOGIC:   Eye movements show no significant nystagmus.      The VNS I said was interrogated and the output is 1.25 and she and I discussed options.  Medical marijuana remains an option for her and we explained the indications and so forth and usage.  The other option  would be Briviact and this information was given also to her.  They are going to explore medical cannabis program.      The VNS changes do not seem to be want to be paid by the insurance and will be looking into that as well.  We will see her in 3 months and continue to pursue a more aggressive program of no anticonvulsant.      Sincerely,       Barney Kendall MD       D: 2017 10:43   T: 2017 11:22   MT: ROSA MARIA    Name:     KELY JAMES   MRN:      [id number]        Account:      [id number]   :      1965           Service Date: 2017    Document: [id number]

## 2017-08-31 ENCOUNTER — TELEPHONE (OUTPATIENT)
Dept: NEUROLOGY | Facility: CLINIC | Age: 52
End: 2017-08-31

## 2017-08-31 DIAGNOSIS — R56.9 UNSPECIFIED CONVULSIONS (H): ICD-10-CM

## 2017-08-31 RX ORDER — CARBAMAZEPINE 200 MG/1
CAPSULE, EXTENDED RELEASE ORAL
Qty: 180 CAPSULE | Refills: 11 | Status: SHIPPED | OUTPATIENT
Start: 2017-08-31 | End: 2018-08-21

## 2017-08-31 NOTE — TELEPHONE ENCOUNTER
Received written directions from Dr. Kendall Patient decrease Carbatrol to 400 mg t.i.d.  Nurse called patient and informed her of this change, and she is agreeable.  Nurse in order to pharmacy.

## 2017-09-01 ENCOUNTER — TELEPHONE (OUTPATIENT)
Dept: NEUROLOGY | Facility: CLINIC | Age: 52
End: 2017-09-01

## 2017-09-01 NOTE — TELEPHONE ENCOUNTER
We reduced her CBZ dose from 1600 to 1200 mg per day due to high level. She had some cardiac issues before AND I AM CONCERNED THIS HIGH DOSE MAY AFFECT HEART CONDUCTION. A NEW vVNS IS IN PLACE. WE OFFER HER BRIVIIACT AND SHE IS CONSIDERING OR  PROGRAM. THE REDUCTION SHOULD BRING LEVEL DOWN TO 12 OR SO.HALEY

## 2017-09-07 ENCOUNTER — TELEPHONE (OUTPATIENT)
Dept: NEUROLOGY | Facility: CLINIC | Age: 52
End: 2017-09-07

## 2017-09-07 DIAGNOSIS — G40.109 LOCALIZATION-RELATED FOCAL EPILEPSY WITH SIMPLE PARTIAL SEIZURES (H): ICD-10-CM

## 2017-09-07 RX ORDER — LORAZEPAM 1 MG/1
1 TABLET ORAL 2 TIMES DAILY
Qty: 60 TABLET | Refills: 5 | Status: SHIPPED | OUTPATIENT
Start: 2017-09-07 | End: 2018-03-09

## 2017-10-25 ENCOUNTER — DOCUMENTATION ONLY (OUTPATIENT)
Dept: OTHER | Facility: CLINIC | Age: 52
End: 2017-10-25

## 2017-10-25 PROBLEM — Z71.89 ADVANCED DIRECTIVES, COUNSELING/DISCUSSION: Chronic | Status: ACTIVE | Noted: 2017-10-25

## 2017-12-07 ENCOUNTER — OFFICE VISIT (OUTPATIENT)
Dept: NEUROLOGY | Facility: CLINIC | Age: 52
End: 2017-12-07

## 2017-12-07 VITALS — HEART RATE: 83 BPM | SYSTOLIC BLOOD PRESSURE: 148 MMHG | HEIGHT: 65 IN | DIASTOLIC BLOOD PRESSURE: 73 MMHG

## 2017-12-07 DIAGNOSIS — G40.209 PARTIAL SYMPTOMATIC EPILEPSY WITH COMPLEX PARTIAL SEIZURES, NOT INTRACTABLE, WITHOUT STATUS EPILEPTICUS (H): Primary | ICD-10-CM

## 2017-12-07 DIAGNOSIS — G40.209 PARTIAL SYMPTOMATIC EPILEPSY WITH COMPLEX PARTIAL SEIZURES, NOT INTRACTABLE, WITHOUT STATUS EPILEPTICUS (H): ICD-10-CM

## 2017-12-07 LAB — CARBAMAZEPINE SERPL-MCNC: 11 MG/L (ref 4–12)

## 2017-12-07 ASSESSMENT — ENCOUNTER SYMPTOMS
HYPERTENSION: 1
NAUSEA: 0
DECREASED LIBIDO: 0
STIFFNESS: 1
LIGHT-HEADEDNESS: 0
PARALYSIS: 0
HOARSE VOICE: 1
DEPRESSION: 0
ARTHRALGIAS: 1
BOWEL INCONTINENCE: 0
SINUS CONGESTION: 0
NUMBNESS: 0
BRUISES/BLEEDS EASILY: 1
WHEEZING: 0
PANIC: 0
TINGLING: 1
COUGH: 0
EXERCISE INTOLERANCE: 0
HOT FLASHES: 0
DYSPNEA ON EXERTION: 0
BACK PAIN: 1
ABDOMINAL PAIN: 0
HEADACHES: 1
POSTURAL DYSPNEA: 0
MEMORY LOSS: 0
CONSTIPATION: 0
HEMOPTYSIS: 0
TREMORS: 1
SEIZURES: 1
SLEEP DISTURBANCES DUE TO BREATHING: 0
NECK MASS: 0
ORTHOPNEA: 0
NECK PAIN: 1
SINUS PAIN: 0
LOSS OF CONSCIOUSNESS: 0
WEAKNESS: 1
BREAST MASS: 0
BLOOD IN STOOL: 0
JAUNDICE: 0
JOINT SWELLING: 0
TROUBLE SWALLOWING: 0
VOMITING: 0
HEARTBURN: 1
DIARRHEA: 0
DIZZINESS: 0
HYPOTENSION: 0
SYNCOPE: 0
TASTE DISTURBANCE: 0
MUSCLE CRAMPS: 0
SPEECH CHANGE: 0
SPUTUM PRODUCTION: 0
SWOLLEN GLANDS: 0
INSOMNIA: 0
SNORES LOUDLY: 0
SHORTNESS OF BREATH: 1
BREAST PAIN: 0
SORE THROAT: 0
NERVOUS/ANXIOUS: 1
LEG PAIN: 0
DECREASED CONCENTRATION: 1
SMELL DISTURBANCE: 0
COUGH DISTURBING SLEEP: 0
MYALGIAS: 1
BLOATING: 0
MUSCLE WEAKNESS: 0
RECTAL PAIN: 0

## 2017-12-07 ASSESSMENT — PAIN SCALES - GENERAL: PAINLEVEL: NO PAIN (0)

## 2017-12-07 NOTE — LETTER
12/7/2017       RE: Mili Mane  [address]  [place] [address]     Dear Colleague,    Thank you for referring your patient, Mili Mane, to the [facility] NEUROLOGY at [facility]. Please see a copy of my visit note below.      Again, thank you for allowing me to participate in the care of your patient.      Sincerely,    Barney Kendall MD

## 2017-12-07 NOTE — PROGRESS NOTES
Answers for HPI/ROS submitted by the patient on 12/7/2017   General Symptoms: No  Skin Symptoms: No  HENT Symptoms: Yes  EYE SYMPTOMS: No  HEART SYMPTOMS: Yes  LUNG SYMPTOMS: Yes  INTESTINAL SYMPTOMS: Yes  URINARY SYMPTOMS: No  GYNECOLOGIC SYMPTOMS: Yes  BREAST SYMPTOMS: Yes  SKELETAL SYMPTOMS: Yes  BLOOD SYMPTOMS: Yes  NERVOUS SYSTEM SYMPTOMS: Yes  MENTAL HEALTH SYMPTOMS: Yes  Ear pain: Yes  Ear discharge: No  Hearing loss: No  Tinnitus: No  Nosebleeds: Yes  Congestion: No  Sinus pain: No  Trouble swallowing: No   Voice hoarseness: Yes  Mouth sores: No  Sore throat: No  Tooth pain: Yes  Gum tenderness: Yes  Bleeding gums: Yes  Change in taste: No  Change in sense of smell: No  Dry mouth: No  Hearing aid used: No  Neck lump: No  Cough: No  Sputum or phlegm: No  Coughing up blood: No  Difficulty breating or shortness of breath: Yes  Snoring: No  Wheezing: No  Difficulty breathing on exertion: No  Nighttime Cough: No  Difficulty breathing when lying flat: No  Chest pain or pressure: No  Pain in legs with walking: No  Trouble breathing while lying down: No  Fingers or toes appear blue: No  High blood pressure: Yes  Low blood pressure: No  Fainting: No  Murmurs: No  Pacemaker: No  Varicose veins: No  Edema or swelling: No  Wake up at night with shortness of breath: No  Light-headedness: No  Exercise intolerance: No  Heart burn or indigestion: Yes  Nausea: No  Vomiting: No  Abdominal pain: No  Bloating: No  Constipation: No  Diarrhea: No  Blood in stool: No  Black stools: No  Rectal or Anal pain: No  Fecal incontinence: No  Yellowing of skin or eyes: No  Vomit with blood: No  Change in stools: No  Back pain: Yes  Muscle aches: Yes  Neck pain: Yes  Swollen joints: No  Joint pain: Yes  Bone pain: Yes  Muscle cramps: No  Muscle weakness: No  Joint stiffness: Yes  Bone fracture: No  Anemia: No  Swollen glands: No  Easy bleeding or bruising: Yes  Dizziness or trouble with balance: No  Fainting or black-out spells:  No  Memory loss: No  Headache: Yes  Seizures: Yes  Speech problems: No  Tingling: Yes  Tremor: Yes  Weakness: Yes  Difficulty walking: No  Paralysis: No  Numbness: No  Bleeding or spotting between periods: No  Heavy or painful periods: No  Irregular periods: No  Vaginal discharge: Yes  Hot flashes: No  Vaginal dryness: No  Genital ulcers: No  Reduced libido: No  Painful intercourse: No  Difficulty with sexual arousal: No  Post-menopausal bleeding: No  Discharge: No  Lumps: No  Pain: No  Nipple retraction: No  Nervous or Anxious: Yes  Depression: No  Trouble sleeping: No  Trouble thinking or concentrating: Yes  Mood changes: No  Panic attacks: No

## 2017-12-07 NOTE — MR AVS SNAPSHOT
After Visit Summary   12/7/2017    Mili Mane    MRN: [id number]           Patient Information     Date Of Birth          1965        Visit Information        Provider Department      12/7/2017 2:00 PM Barney Kendall MD [facility] Neurology        Today's Diagnoses     Partial symptomatic epilepsy with complex partial seizures, not intractable, without status epilepticus (H)    -  1       Follow-ups after your visit        Follow-up notes from your care team     Return in about 6 months (around 6/7/2018).      Your next 10 appointments already scheduled     Dec 07, 2017  3:15 PM CST   LAB with  LAB   [facility] ([facility])    [address] [place] [id number] [phone]           Please do not eat 10-12 hours before your appointment if you are coming in fasting for labs on lipids, cholesterol, or glucose (sugar). This does not apply to pregnant women. Water, hot tea and black coffee (with nothing added) are okay. Do not drink other fluids, diet soda or chew gum.            Dec 07, 2017  4:40 PM CST   CT HEAD W/O CONTRAST with UCCT1   [facility] ([facility])    [address] [place] [id number] [phone]           Please bring any scans or X-rays taken at other hospitals, if similar tests were done. Also bring a list of your medicines, including vitamins, minerals and over-the-counter drugs. It is safest to leave personal items at home.  Be sure to tell your doctor:   If you have any allergies.   If there s any chance you are pregnant.   If you are breastfeeding.   If you have any special needs.  You do not need to do anything special to prepare.  Please wear loose clothing, such as a sweat suit or jogging clothes. Avoid snaps, zippers and other metal. We may ask you to undress and put on a hospital gown.            Jun 07, 2018  1:00 PM CDT   (Arrive by 12:45  "PM)   Return Seizure with Barney Kendall MD   [facility] ([facility])    [address] [address] [phone]              Future tests that were ordered for you today     Open Future Orders        Priority Expected Expires Ordered    Carbamazepine total Routine  2018    CT Head w/o contrast Routine  2018            Who to contact     Please call your clinic at [phone] to:    Ask questions about your health    Make or cancel appointments    Discuss your medicines    Learn about your test results    Speak to your doctor   If you have compliments or concerns about an experience at your clinic, or if you wish to file a complaint, please contact [facility] Patient Relations at [phone] or email us at [email or web address]         Additional Information About Your Visit        [employer] Information     [employer] is an electronic gateway that provides easy, online access to your medical records. With [employer] you can request a clinic appointment, read your test results, renew a prescription or communicate with your care team.     To sign up for [employer] visit the website at [email or web address]   You will be asked to enter the access code listed below, as well as some personal information. Please follow the directions to create your username and password.     Your access code is: [email or web address]  Expires: 2018  6:30 AM     Your access code will  in 90 days. If you need help or a new code, please contact your [facility] or call [phone] for assistance.        Care EveryWhere ID     This is your Care EveryWhere ID. This could be used by other organizations to access your [facility] medical records  [id number]        Your Vitals Were     Pulse Height                83 1.651 m (5' 5\")           Blood Pressure from Last 3 Encounters: "   12/07/17 148/73   08/29/17 139/82   07/07/17 152/84    Weight from Last 3 Encounters:   08/29/17 62.1 kg (137 lb)   07/07/17 61.7 kg (136 lb 0.4 oz)   06/30/17 61.7 kg (136 lb)               Primary Care Provider Office Phone # Fax #    Prakash Rider [phone]       [facility] [address] [address]        Equal Access to Services     STACY WHELAN : Hadii aad ku hadasho Soomaali, waaxda luqadaha, qaybta kaalmada adeegyada, waxkathi allen haylisen davonte unger . So [facility] [phone].    ATENCIÓN: Si habla español, tiene a silva disposición servicios gratuitos de asistencia lingüística. [facility] [phone].    We comply with applicable federal civil rights laws and [place] laws. We do not discriminate on the basis of race, color, national origin, age, disability, sex, sexual orientation, or gender identity.            Thank you!     Thank you for choosing [facility] NEUROLOGY  for your care. Our goal is always to provide you with excellent care. Hearing back from our patients is one way we can continue to improve our services. Please take a few minutes to complete the written survey that you may receive in the mail after your visit with us. Thank you!             Your Updated Medication List - Protect others around you: Learn how to safely use, store and throw away your medicines at www.disposemymeds.org.          This list is accurate as of: 12/7/17  3:07 PM.  Always use your most recent med list.                   Brand Name Dispense Instructions for use Diagnosis    atenolol 25 MG tablet    TENORMIN     Take 50 mg by mouth daily    Essential hypertension, benign, Partial symptomatic epilepsy with complex partial seizures, not intractable, without status epilepticus (H)       CARBATROL 200 MG 12 hr capsule   Generic drug:  carBAMazepine     180 capsule    Take 2 tabs (400mg) by mouth in the AM, 2 tabs (400mg) in the afternoon, and 2 tabs (400mg) in the PM.     Unspecified convulsions (H)       CENTRUM SILVER per tablet      Take 1 tablet by mouth daily        LORazepam 1 MG tablet    ATIVAN    60 tablet    Take 1 tablet (1 mg) by mouth 2 times daily    Localization-related focal epilepsy with simple partial seizures (H)

## 2017-12-11 NOTE — PROGRESS NOTES
2017             [place] [address]      RE: Mili James   MRN: [id number]   : 1965      Dear Dr. Rider:       Ms. James is a 52-year-old with intractable complex partial seizures.  She is having them recently at a frequency of one every couple of weeks.  Her sister comes with her, as their mother has passed away.      Since the VNS was changed, she says she has had improvement, and she feels it because her (?) boyfriend (?) is off.      She has had no other health issues.  She follows with you for the hypertension, and this has not been very well controlled.      We were talking about drug options, and she is interested now in medical marijuana, and I will make arrangements.      She has had levels of carbamazepine on the high side, but has really not been symptomatic and has had a good pulse and no double vision, no blurred vision.  She says that we did reduce the medication, but I do not see that documented.  We will recheck her concentration today.      PHYSICAL EXAMINATION:  Her vital signs shows her blood pressure is slightly elevated, and she is at 148/70.  Pulse is 83.  Her VNS is interrogated, and it is working fine and is back to baseline where she was before.      She has no nystagmus on lateral gaze.      In summary, she is doing better due to the VNS.  We will go ahead and enroll her on the medical marijuana program, and we will see her in followup in 6 months.  We will check her concentration today.      Sincerely,      MD ALDEN Carrera MD             D: 2017 15:51   T: 2017 07:23   MT: mi      Name:     MILI JAMES   MRN:      [id number]        Account:      [id number]   :      1965           Service Date: 2017      Document: [id number]

## 2018-03-09 DIAGNOSIS — G40.109 LOCALIZATION-RELATED FOCAL EPILEPSY WITH SIMPLE PARTIAL SEIZURES (H): ICD-10-CM

## 2018-03-16 RX ORDER — LORAZEPAM 1 MG/1
TABLET ORAL
Qty: 60 TABLET | Refills: 3 | Status: SHIPPED | OUTPATIENT
Start: 2018-03-16 | End: 2018-07-09

## 2018-05-24 ENCOUNTER — RADIANT APPOINTMENT (OUTPATIENT)
Dept: CT IMAGING | Facility: CLINIC | Age: 53
End: 2018-05-24
Attending: PSYCHIATRY & NEUROLOGY
Payer: COMMERCIAL

## 2018-06-07 ENCOUNTER — OFFICE VISIT (OUTPATIENT)
Dept: NEUROLOGY | Facility: CLINIC | Age: 53
End: 2018-06-07
Payer: COMMERCIAL

## 2018-06-07 VITALS
HEIGHT: 65 IN | BODY MASS INDEX: 23.89 KG/M2 | DIASTOLIC BLOOD PRESSURE: 72 MMHG | HEART RATE: 81 BPM | SYSTOLIC BLOOD PRESSURE: 139 MMHG | WEIGHT: 143.4 LBS

## 2018-06-07 DIAGNOSIS — G40.209 PARTIAL SYMPTOMATIC EPILEPSY WITH COMPLEX PARTIAL SEIZURES, NOT INTRACTABLE, WITHOUT STATUS EPILEPTICUS (H): Primary | ICD-10-CM

## 2018-06-07 DIAGNOSIS — G40.209 PARTIAL SYMPTOMATIC EPILEPSY WITH COMPLEX PARTIAL SEIZURES, NOT INTRACTABLE, WITHOUT STATUS EPILEPTICUS (H): ICD-10-CM

## 2018-06-07 ASSESSMENT — PAIN SCALES - GENERAL: PAINLEVEL: NO PAIN (0)

## 2018-06-07 NOTE — LETTER
Date:January 30, 2018      Patient was self referred, no letter generated. Do not send.        [facility] Health Information

## 2018-06-07 NOTE — Clinical Note
6/7/2018       RE: Mili Mane  [address]  [place] [address]     Dear Colleague,    Thank you for referring your patient, Mili Mane, to the [facility] NEUROLOGY at [facility]. Please see a copy of my visit note below.    No notes on file    Again, thank you for allowing me to participate in the care of your patient.      Sincerely,    Barney Kendall MD

## 2018-06-07 NOTE — MR AVS SNAPSHOT
"              After Visit Summary   2018    Mili Mane    MRN: [id number]           Patient Information     Date Of Birth          1965        Visit Information        Provider Department      2018 1:00 PM Barney Kendall MD [facility] Neurology        Today's Diagnoses     Partial symptomatic epilepsy with complex partial seizures, not intractable, without status epilepticus (H)    -  1       Follow-ups after your visit        Follow-up notes from your care team     Return in about 6 months (around 2018).      Who to contact     Please call your clinic at [phone] to:    Ask questions about your health    Make or cancel appointments    Discuss your medicines    Learn about your test results    Speak to your doctor            Additional Information About Your Visit        MyChart Information     [employer] is an electronic gateway that provides easy, online access to your medical records. With [employer], you can request a clinic appointment, read your test results, renew a prescription or communicate with your care team.     To sign up for [employer] visit the website at [email or web address]   You will be asked to enter the access code listed below, as well as some personal information. Please follow the directions to create your username and password.     Your access code is: [email or web address]  Expires: 2018  6:30 AM     Your access code will  in 90 days. If you need help or a new code, please contact your [facility] or call [phone] for assistance.        Care EveryWhere ID     This is your Care EveryWhere ID. This could be used by other organizations to access your [facility] medical records  [id number]        Your Vitals Were     Pulse Height BMI (Body Mass Index)             81 1.651 m (5' 5\") 23.86 kg/m2          Blood Pressure from Last 3 Encounters:   18 139/72   17 148/73   17 139/82    Weight from Last 3 Encounters: "   06/07/18 65 kg (143 lb 6.4 oz)   08/29/17 62.1 kg (137 lb)   07/07/17 61.7 kg (136 lb 0.4 oz)               Primary Care Provider Office Phone # Fax #    Parkash Rider [phone]       [facility] [address] [facility] [address]        Equal Access to Services     DAVID WHELAN : Hadii aad ku hadasho Soomaali, waaxda luqadaha, qaybta kaalmada adeegyada, waxay idiin hayaan adeeg khmyriamsh laDelaneyaan ah. So [facility] [phone].    ATENCIÓN: Si alexa barraza, tiene a silva disposición servicios gratuitos de asistencia lingüística. Nehmeiahame al [phone].    We comply with applicable federal civil rights laws and [place] laws. We do not discriminate on the basis of race, color, national origin, age, disability, sex, sexual orientation, or gender identity.            Thank you!     Thank you for choosing [facility] NEUROLOGY  for your care. Our goal is always to provide you with excellent care. Hearing back from our patients is one way we can continue to improve our services. Please take a few minutes to complete the written survey that you may receive in the mail after your visit with us. Thank you!             Your Updated Medication List - Protect others around you: Learn how to safely use, store and throw away your medicines at www.disposemymeds.org.          This list is accurate as of 6/7/18 11:59 PM.  Always use your most recent med list.                   Brand Name Dispense Instructions for use Diagnosis    atenolol 25 MG tablet    TENORMIN     Take 50 mg by mouth daily    Essential hypertension, benign, Partial symptomatic epilepsy with complex partial seizures, not intractable, without status epilepticus (H)       CARBATROL 200 MG 12 hr capsule   Generic drug:  carBAMazepine     180 capsule    Take 2 tabs (400mg) by mouth in the AM, 2 tabs (400mg) in the afternoon, and 2 tabs (400mg) in the PM.    Unspecified convulsions (H)       CENTRUM SILVER per tablet      Take 1 tablet by  mouth daily        LORazepam 1 MG tablet    ATIVAN    60 tablet    TAKE ONE TABLET BY MOUTH TWICE DAILY    Localization-related focal epilepsy with simple partial seizures (H)

## 2018-06-08 NOTE — PROGRESS NOTES
Service Date: 2018      Prakash Rider MD   [address] [address]      RE: Mili James   MRN: [id number]   : 1965      Dear Dr. Rider:       Ms. James is 53, and she has intractable simple and complex partial seizures that have been evaluated in the past.  She is very sensitive to medication.  She has also been reluctant to try new medication.  She comes in accompanied by her sister.  She continues to have simple partial or as best I can determine some complex partial seizures.  She has been managed on monotherapy with carbamazepine, and she is happy with that.  She is on Carbatrol 400 in the morning, 400 in the afternoon and 400 in the evening.  She also takes atenolol for hypertension, multivitamins and lorazepam as rescue medication for her seizure.  She has a VNS, and a year ago we changed the battery.      She had been marginally interested in medical cannabis and now comes in today and seems to be more inclined to it and wants to explore it further.      PHYSICAL EXAMINATION:  Her blood pressure is good at 139/78.  Pulse is 78.  There are no signs of toxicity.  Her eye movements are full without nystagmus.      In summary, continued seizures.  We will refer her to the medical cannabis program, and they will be sending me the e-mail to take it further.  Blood levels were done today.  In all other respects, she is stable.      Sincerely,      MD ALDEN Carrera MD             D: 2018   T: 2018   MT: mi      Name:     MILI JAMES   MRN:      [id number]        Account:      [id number]   :      1965           Service Date: 2018      Document: [id number]

## 2018-06-09 LAB
CARBAMAZEPINE EP SERPL-MCNC: 2.2 UG/ML
CARBAMAZEPINE SERPL-MCNC: 11.4 UG/ML

## 2018-07-09 DIAGNOSIS — G40.109 LOCALIZATION-RELATED FOCAL EPILEPSY WITH SIMPLE PARTIAL SEIZURES (H): ICD-10-CM

## 2018-07-11 RX ORDER — LORAZEPAM 1 MG/1
TABLET ORAL
Qty: 60 TABLET | Refills: 2 | Status: SHIPPED | OUTPATIENT
Start: 2018-07-11 | End: 2018-10-15

## 2018-07-12 DIAGNOSIS — G40.109 LOCALIZATION-RELATED FOCAL EPILEPSY WITH SIMPLE PARTIAL SEIZURES (H): ICD-10-CM

## 2018-07-16 ENCOUNTER — TELEPHONE (OUTPATIENT)
Dept: NEUROLOGY | Facility: CLINIC | Age: 53
End: 2018-07-16

## 2018-07-16 NOTE — TELEPHONE ENCOUNTER
Called in medication to [facility] pharmacy on file for Lorazepam per Dr. Burgess/order.

## 2018-08-14 RX ORDER — LORAZEPAM 1 MG/1
TABLET ORAL
Qty: 60 TABLET | Refills: 2 | OUTPATIENT
Start: 2018-08-14

## 2018-08-21 DIAGNOSIS — R56.9 UNSPECIFIED CONVULSIONS (H): ICD-10-CM

## 2018-08-24 ENCOUNTER — TELEPHONE (OUTPATIENT)
Dept: NEUROLOGY | Facility: CLINIC | Age: 53
End: 2018-08-24

## 2018-08-24 NOTE — TELEPHONE ENCOUNTER
CENTRAL PRIOR AUTHORIZATION  [phone]    [place] Initiation    Medication: Carbatrol ER 12hr 200 mg cap  Insurance Company: [employer] - Phone [phone] Fax [phone]  Pharmacy Filling the Rx: [facility] [address]  Filling Pharmacy Phone: [phone]  Filling Pharmacy Fax: [phone]  Start Date: 8/24/2018

## 2018-08-24 NOTE — TELEPHONE ENCOUNTER
Prior Authorization Retail Medication Request    Medication/Dose: Carbatrol ER 12hr 200 mg cap  ICD code (if different than what is on RX):  Unspecified convulsions (H) [R56.9]   Previously Tried and Failed:  Vimpat, Briviact, Keppra, others failed but not sure which  Rationale:      Insurance Name:  [employer]   Insurance ID:  [id number]       Pharmacy Information (if different than what is on RX)  Name:  [facility] [address] [place] [address]  Phone:  [phone]

## 2018-08-24 NOTE — TELEPHONE ENCOUNTER
Prior Authorization Approval    Authorization Effective Date: 7/25/2018  Authorization Expiration Date: 8/24/2019  Medication: Carbatrol ER 12hr 200 mg cap-APPROVED   Approved Dose/Quantity:   Reference #: [id number]   Insurance Company: [employer] - Phone [phone] Fax [phone]  Expected CoPay:       CoPay Card Available:      Foundation Assistance Needed:    Which Pharmacy is filling the prescription (Not needed for infusion/clinic administered): [facility] [address]  Pharmacy Notified: Yes  Patient Notified: Yes

## 2018-08-24 NOTE — TELEPHONE ENCOUNTER
Social Work Intervention  [facility]    Data/Intervention:    Patient Name:  Mili Mane  /Age:  1965 (53 year old)    Visit Type: telephone  Referral Source: Jan Cui RN   Reason for Referral:  Pt confused about her insurance    Collaborated With:    -Mili    Patient Concerns/Issues:   Pt indicated that she's worried about her insurance and doesn't understand the letter she received from [facility]. She read some of it to me. It indicates that on  the QMB program stopped because she is over the QMB limit. It said she could keep the card in case she can use it in the future. There was information about a medical spend down but it was unclear. I looked up the info online with [place] the [place] insurance system which indicates she has Medicare, [employer] SNBC/MA. There is no information about a spend down. She states that she has been afraid to use her ucare. She hasn't had any problems refilling her meds.    Intervention/Education/Resources Provided:  Discussed the letter and what I see online with her insurance. Made her aware that she can use her [employer] card and that it won't work if she is dis-enrolled. She was afraid that if she used it and she wasn't supposed to that she would get in trouble with [facility]. She reported that she is getting the same amount from [employer] deposited into her checking acct so it doesn't appear they are taking out more to pay her Part B coverage.   She inquired about whether medical cannabis is covered by her insurance and I indicated that she should contact her Part D plan and [facility] about that.   Discussed the transportation coverage she has with [facility] to get to app and how to use that. Also discussed the CADI waiver should she need that in the future.    Assessment/Plan:  Confusing letter from  but it appears there has been no change in her coverage at this time. She is aware she can contact me as needed in the  future.    Provided patient/family with contact information and availability.    EKTA Marie, [facility]    [facility]  [phone]

## 2018-09-07 RX ORDER — CARBAMAZEPINE 200 MG/1
CAPSULE, EXTENDED RELEASE ORAL
Qty: 180 CAPSULE | Refills: 11 | Status: SHIPPED | OUTPATIENT
Start: 2018-09-07 | End: 2019-07-29

## 2018-10-15 ENCOUNTER — TELEPHONE (OUTPATIENT)
Dept: NEUROLOGY | Facility: CLINIC | Age: 53
End: 2018-10-15

## 2018-10-15 DIAGNOSIS — G40.109 LOCALIZATION-RELATED FOCAL EPILEPSY WITH SIMPLE PARTIAL SEIZURES (H): ICD-10-CM

## 2018-10-15 RX ORDER — LORAZEPAM 1 MG/1
1 TABLET ORAL 2 TIMES DAILY
Qty: 60 TABLET | Refills: 3 | Status: SHIPPED | OUTPATIENT
Start: 2018-10-15 | End: 2019-01-31

## 2018-10-15 NOTE — TELEPHONE ENCOUNTER
Patient called clinic concerned that he TEGRETOL was ordered incorrectly;  ( 2 tabs three times daily ).   Change was sent to [facility] pharmacy 8/31/17 with a note of dose adjustment; however, this adjustment was not made and patient continued to get previous script until it's expiration date arrived earlier this year and patient now got first supply with dose change.   Nurse will discuss with Dr. Kendall.

## 2019-01-28 DIAGNOSIS — G40.109 LOCALIZATION-RELATED FOCAL EPILEPSY WITH SIMPLE PARTIAL SEIZURES (H): ICD-10-CM

## 2019-01-31 RX ORDER — LORAZEPAM 1 MG/1
1 TABLET ORAL 2 TIMES DAILY
Qty: 60 TABLET | Refills: 3 | Status: SHIPPED | OUTPATIENT
Start: 2019-01-31 | End: 2019-06-04

## 2019-06-04 ENCOUNTER — TELEPHONE (OUTPATIENT)
Dept: NEUROLOGY | Facility: CLINIC | Age: 54
End: 2019-06-04

## 2019-06-04 DIAGNOSIS — G40.109 LOCALIZATION-RELATED FOCAL EPILEPSY WITH SIMPLE PARTIAL SEIZURES (H): ICD-10-CM

## 2019-06-04 RX ORDER — LORAZEPAM 1 MG/1
1 TABLET ORAL 2 TIMES DAILY
Qty: 60 TABLET | Refills: 3 | Status: SHIPPED | OUTPATIENT
Start: 2019-06-04 | End: 2019-09-30

## 2019-07-29 DIAGNOSIS — R56.9 UNSPECIFIED CONVULSIONS (H): Primary | ICD-10-CM

## 2019-07-31 RX ORDER — CARBAMAZEPINE 200 MG/1
CAPSULE, EXTENDED RELEASE ORAL
Qty: 180 CAPSULE | Refills: 9 | Status: SHIPPED | OUTPATIENT
Start: 2019-07-31 | End: 2019-10-01

## 2019-08-13 ENCOUNTER — TELEPHONE (OUTPATIENT)
Dept: NEUROLOGY | Facility: CLINIC | Age: 54
End: 2019-08-13

## 2019-08-13 NOTE — TELEPHONE ENCOUNTER
Spoke with Mili, confirmed that [facility] is requiring re-certification, and told her that I would let Dr. Kendall know and he should be able to complete that today.  Patient also notes that she has been doing well with the cannabis; last month she had 23 days seizure free with the cannabis.

## 2019-08-13 NOTE — TELEPHONE ENCOUNTER
[facility] Call Center    Phone Message    May a detailed message be left on voicemail: yes    Reason for Call: Other: pt is in the cannabis program, pt needs this program to be renewed, she is unable to get any more medication until it's renewed, please call to discuss     Action Taken: Message routed to:  [facility] ([facility] Neurology

## 2019-08-15 NOTE — TELEPHONE ENCOUNTER
[facility] Call Center    Phone Message    May a detailed message be left on voicemail: yes    Reason for Call: Other: Mili tried calling the Dept of Health and they told her that she needs to see Dr. Kendall prior to being recertified into the program.  She is highly confused on what she needs to do.  Please call her back to discuss     Action Taken: Message routed to:  [facility] ([facility]):  Neurology

## 2019-08-16 NOTE — TELEPHONE ENCOUNTER
Nurse checked with Dr. Kendall, who indicates he will complete certification today.  Patient has follow up appointment 9/5/19.

## 2019-09-27 ENCOUNTER — OFFICE VISIT (OUTPATIENT)
Dept: NEUROLOGY | Facility: CLINIC | Age: 54
End: 2019-09-27
Payer: COMMERCIAL

## 2019-09-27 VITALS
SYSTOLIC BLOOD PRESSURE: 162 MMHG | OXYGEN SATURATION: 100 % | WEIGHT: 145 LBS | RESPIRATION RATE: 16 BRPM | HEART RATE: 91 BPM | BODY MASS INDEX: 24.13 KG/M2 | DIASTOLIC BLOOD PRESSURE: 95 MMHG

## 2019-09-27 DIAGNOSIS — G40.209 PARTIAL SYMPTOMATIC EPILEPSY WITH COMPLEX PARTIAL SEIZURES, NOT INTRACTABLE, WITHOUT STATUS EPILEPTICUS (H): ICD-10-CM

## 2019-09-27 DIAGNOSIS — G40.209 PARTIAL SYMPTOMATIC EPILEPSY WITH COMPLEX PARTIAL SEIZURES, NOT INTRACTABLE, WITHOUT STATUS EPILEPTICUS (H): Primary | ICD-10-CM

## 2019-09-27 LAB
ANION GAP SERPL CALCULATED.3IONS-SCNC: 6 MMOL/L (ref 3–14)
BUN SERPL-MCNC: 7 MG/DL (ref 7–30)
CALCIUM SERPL-MCNC: 9 MG/DL (ref 8.5–10.1)
CARBAMAZEPINE SERPL-MCNC: 11.4 MG/L (ref 4–12)
CHLORIDE SERPL-SCNC: 93 MMOL/L (ref 94–109)
CO2 SERPL-SCNC: 28 MMOL/L (ref 20–32)
CREAT SERPL-MCNC: 0.52 MG/DL (ref 0.52–1.04)
GFR SERPL CREATININE-BSD FRML MDRD: >90 ML/MIN/{1.73_M2}
GLUCOSE SERPL-MCNC: 102 MG/DL (ref 70–99)
POTASSIUM SERPL-SCNC: 4.1 MMOL/L (ref 3.4–5.3)
SODIUM SERPL-SCNC: 126 MMOL/L (ref 133–144)

## 2019-09-27 ASSESSMENT — PAIN SCALES - GENERAL: PAINLEVEL: MODERATE PAIN (5)

## 2019-09-27 NOTE — LETTER
2019       RE: Mili Mane  [address]  [place] [address]     Dear Colleague,    Thank you for referring your patient, Mili Mane, to the [facility]UR[facility] at [facility]. Please see a copy of my visit note below.    Service Date: 2019      Leanna Floyd MD   [place] [id number]       RE: Mili Mane   MRN: [id number]   : 1965      Dear Dr. Floyd:      I have seen once more Mili Mane, a 54-year-old woman with a history intractable complex partial seizure.  She has had difficulty with medication in the past, and the only thing that she was treated it for a long period of time is the carbamazepine, which she takes 400 twice a day.  She comes for regular followup.  Her last visit with me, her carbamazepine total was 11.4 and the epoxide was 2.2.  She has been getting [employer]eline medical marijuana, and she feels it has been very successful.  She has been on 3 mL twice a day, and she has also been in the past on Tangerine, but this has been discontinued.  She is paying like $500 a month.  She has a VNS which is working.  Since her last visit, she had a total of 23 seizures, only 5 of which were large, and this to her is considerably better that it has been before, and believes this medical marijuana has helped her with her pains in her legs.  She does have hypertension.  This has been an issue in the past.  The question is whether these seizure medications will be affected by antihypertensive medications, and it is a negative.  Also, she can have the flu shot and the shingles shot.      Her sodium has been marginal at 131, and that does need to be followed.  She may not be able to stay on that drug after a while.  We gave her a 6-month followup.  Her VNS seems to be working well.  We also gave her Ativan prescription which she uses for  anxiety.  She is accompanied by his sister.  We will see her in 6 months, and please monitor the sodium together.   Will review her meds by pharmacy and see if she can be tried on different med as the sodium problem is to get worst likely.   is helping her.     Sincerely,     Barney Kendall MD         D: 2019   T: 2019   MT: NATHAN      Name:     KELY JAMES   MRN:      [id number]        Account:      [id number]   :      1965           Service Date: 2019      Document: [id number]

## 2019-09-27 NOTE — PROGRESS NOTES
Service Date: 2019      [place] [address]       RE: Mili Mane   MRN: [id number]   : 1965      Dear Dr. Floyd:      I have seen once more Mili Mane, a 54-year-old woman with a history intractable complex partial seizure.  She has had difficulty with medication in the past, and the only thing that she was treated it for a long period of time is the carbamazepine, which she takes 400 twice a day.  She comes for regular followup.  Her last visit with me, her carbamazepine total was 11.4 and the epoxide was 2.2.  She has been getting [employer] medical marijuana, and she feels it has been very successful.  She has been on 3 mL twice a day, and she has also been in the past on Tangerine, but this has been discontinued.  She is paying like $500 a month.  She has a VNS which is working.  Since her last visit, she had a total of 23 seizures, only 5 of which were large, and this to her is considerably better that it has been before, and believes this medical marijuana has helped her with her pains in her legs.  She does have hypertension.  This has been an issue in the past.  The question is whether these seizure medications will be affected by antihypertensive medications, and it is a negative.  Also, she can have the flu shot and the shingles shot.      Her sodium has been marginal at 131, and that does need to be followed.  She may not be able to stay on that drug after a while.  We gave her a 6-month followup.  Her VNS seems to be working well.  We also gave her Ativan prescription which she uses for anxiety.  She is accompanied by his sister.  We will see her in 6 months, and please monitor the sodium together.   Will review her meds by pharmacy and see if she can be tried on different med as the sodium problem is to get worst likely.  ABBY is helping her.     Sincerely,             MD ALDEN Carrera MD             D: 2019   T: 2019   MT: NATHAN      Name:     KELY JAMES   MRN:      [id number]        Account:      [id number]   :      1965           Service Date: 2019      Document: [id number]

## 2019-09-30 DIAGNOSIS — G40.109 LOCALIZATION-RELATED FOCAL EPILEPSY WITH SIMPLE PARTIAL SEIZURES (H): ICD-10-CM

## 2019-09-30 RX ORDER — LORAZEPAM 1 MG/1
1 TABLET ORAL 2 TIMES DAILY
Qty: 60 TABLET | Refills: 3 | Status: SHIPPED | OUTPATIENT
Start: 2019-09-30 | End: 2019-10-03

## 2019-10-01 ENCOUNTER — TELEPHONE (OUTPATIENT)
Dept: NEUROLOGY | Facility: CLINIC | Age: 54
End: 2019-10-01

## 2019-10-01 DIAGNOSIS — R56.9 UNSPECIFIED CONVULSIONS (H): ICD-10-CM

## 2019-10-01 RX ORDER — CARBAMAZEPINE 200 MG/1
CAPSULE, EXTENDED RELEASE ORAL
Qty: 124 CAPSULE | Refills: 9 | Status: SHIPPED | OUTPATIENT
Start: 2019-10-01 | End: 2020-05-26

## 2019-10-01 NOTE — TELEPHONE ENCOUNTER
Nurse received message from Dr. Kendall:    Barney Kendall MD sent to Jan Cui, RN             Call to reduce CARBATROL FROM 200 MG AT 2 TID TO 2 CAPS BID.   TO CHECK WITH HER PRIVATE MD ABOUT RECHECKING NA IN 2 WEEKS.TALK WITH HER SISTER.   LEVEL TO HIGH AN D NA TOO LOW   HALEY      Placed call to sister and requested a call back.  Received a call back from number listed as patient's sister indicating that there is no Mili there.    Called patient with instructions to decrease Carbatrol to 2 tabs (400 mg) twice daily - no longer three times per day, and to get re-check of sodium in 2 weeks.  Medication record changed to reflect this change and order placed for Sodium blood draw.

## 2019-10-02 DIAGNOSIS — G40.109 LOCALIZATION-RELATED FOCAL EPILEPSY WITH SIMPLE PARTIAL SEIZURES (H): ICD-10-CM

## 2019-10-02 NOTE — TELEPHONE ENCOUNTER
Lorazepam shows it was approved but locally printed. Please either fax us the hardcopy or resend via eprescribe. Thank you!

## 2019-10-03 RX ORDER — LORAZEPAM 1 MG/1
1 TABLET ORAL 2 TIMES DAILY
Qty: 60 TABLET | Refills: 3 | Status: SHIPPED | OUTPATIENT
Start: 2019-10-03 | End: 2019-10-03

## 2019-10-03 RX ORDER — LORAZEPAM 1 MG/1
1 TABLET ORAL 2 TIMES DAILY
Qty: 60 TABLET | Refills: 3 | Status: SHIPPED | OUTPATIENT
Start: 2019-10-03 | End: 2020-02-04

## 2019-10-07 ENCOUNTER — TELEPHONE (OUTPATIENT)
Dept: NEUROLOGY | Facility: CLINIC | Age: 54
End: 2019-10-07

## 2019-10-07 NOTE — TELEPHONE ENCOUNTER
Followed up today with patient who indicated she needed to be recertified for the Medical Cannabis program.  Inquired with her as to if she was able to get it yet as Dr. Kendall had recertified her.  Patient indicated she is not getting it again as of yet nd when asked about last call to the program she indicated she has not spoken with them since about August.  Nurse indicated that she would likely need to place a call to them, and asked her to call back if there is continued difficulty.

## 2019-10-10 ENCOUNTER — OFFICE VISIT (OUTPATIENT)
Dept: PHARMACY | Facility: CLINIC | Age: 54
End: 2019-10-10

## 2019-10-10 VITALS — OXYGEN SATURATION: 97 % | HEART RATE: 82 BPM | SYSTOLIC BLOOD PRESSURE: 175 MMHG | DIASTOLIC BLOOD PRESSURE: 98 MMHG

## 2019-10-10 DIAGNOSIS — Z78.9 TAKES DIETARY SUPPLEMENTS: ICD-10-CM

## 2019-10-10 DIAGNOSIS — G40.209 PARTIAL SYMPTOMATIC EPILEPSY WITH COMPLEX PARTIAL SEIZURES, NOT INTRACTABLE, WITHOUT STATUS EPILEPTICUS (H): Primary | ICD-10-CM

## 2019-10-10 DIAGNOSIS — R56.9 UNSPECIFIED CONVULSIONS (H): ICD-10-CM

## 2019-10-10 DIAGNOSIS — I10 BENIGN ESSENTIAL HYPERTENSION: ICD-10-CM

## 2019-10-10 LAB — SODIUM BLD-SCNC: 136 MMOL/L (ref 133–144)

## 2019-10-10 PROCEDURE — 99607 MTMS BY PHARM ADDL 15 MIN: CPT | Performed by: PHARMACIST

## 2019-10-10 PROCEDURE — 99605 MTMS BY PHARM NP 15 MIN: CPT | Performed by: PHARMACIST

## 2019-10-10 RX ORDER — ATENOLOL 50 MG/1
50 TABLET ORAL EVERY MORNING
Status: ON HOLD | COMMUNITY

## 2019-10-10 ASSESSMENT — PAIN SCALES - GENERAL: PAINLEVEL: NO PAIN (0)

## 2019-10-10 NOTE — PATIENT INSTRUCTIONS
Recommendations from today's MTM visit:                                                      1. Check your spam folder for a message from the Office of Medical Cannabis and/or call them at [phone].  2. Check sodium level downstairs today.   3. I will call you at 1:30 pm this afternoon.     It was great to speak with you today.  I value your experience and would be very thankful for your time with providing feedback on our clinic survey. You may receive a survey via email or text message in the next few days.     Next MTM visit: TBD    To schedule another MTM appointment, please call the clinic directly or you may call the MTM scheduling line at [phone] or toll-free at [phone].     My Clinical Pharmacist's contact information:                                                      It was a pleasure talking with you today!  Please feel free to contact me with any questions or concerns you have.      Kailey Gutierrez, Pharm.D.  Medication Therapy Management Pharmacist  Phone: [phone]

## 2019-10-10 NOTE — PROGRESS NOTES
"SUBJECTIVE/OBJECTIVE:                           Mili Mane is a 54 year old female coming in for an initial visit for Medication Therapy Management.  She was referred to me from Dr. Kendall. Sister, Patricia, also present for the visit today.     Chief Complaint: Initial MTM visit    Allergies/ADRs: Reviewed in Epic  Tobacco: No tobacco use  Alcohol: not currently using  Caffeine: not discussed  Activity: not discussed  PMH: Reviewed in Epic    Medication Adherence/Access: no issues reported    Complex partial seizures: Currently taking Carbatrol 400 mg twice daily and lorazepam 1 mg twice daily. The patient states she has been taking Carbatol for many years. Lorazepam was added during a hospitalization and she thinks it's for seizures. She was previously taking medical cannabis for seizures as well, but has been off of it since September 15 because she needed to be re-certified. The patient states that the cannabis decreased her seizures by half, which she is very pleased with. She was taking Cobalt 2 mg twice daily (with plans to increase to 3 times daily) and Tangerine as needed (first and 3rd weeks of the month due to hormones). She has had more seizures since going off cannabis and since Carbatrol was decreased by Dr. Kendall a couple weeks ago. She has had more \"bad\" seizures that have resulted in falls/bruising. Patient states that weather affects her seizures and she is concerned about her seizures continuing to worsen (last seizure was this morning). She is hoping to be back on cannabis and she is reluctant to go off Carbatrol despite hyponatremia (which she reports has been present for the past few years). The patient states that she is very concerned about trying new seizure medications because she was in the hospital with side effects of medications in the past. She has a vagal nerve stimulator and states she has been able to stop all of her her seizures recently. Patient denies depression or anxiety, " but does admit to feeling down because of her chronic disease and she feels anxious at times because she lives with her 91 year old father.             Patient does not currently have access to records prior to 2000s. She was seen by Dr. Marroquin at [facility]. Previous trials of antiepileptic medications per patient history:      Dilantin - early 70s? Titration was reportedly too fast so she had a rash and made her gums puffy     Celontin (Methsuximide) - allergic reaction, throat closed and severe rash (was given steroids)    Mysoline - early 70s? maybe gave her a rash    Phenobarbital - early 70s? Thinks she had a rash and then stayed on it     Levetiracetam - thinks she experienced GI upset on it (18 years ago) and would be willing to try again    Topiramate - nausea    Depakene (severe nausea) but tolerated Depakote for 9 years and then started experiencing nausea     Lacosamide - she doesn't remember this one    Neurontin - nausea    Klonopin with Depakote and it made her shaky but no rash     Lamictal - may have tried at [facility]?    Hypertension: Current medications include atenolol 50 mg daily.  Patient does self-monitor BP. Home BP monitoring in range of 130-140's systolic. Patient reports no current medication side effects.    Vitamins: Taking daily MVI and reports no concerns.     BP Readings from Last 3 Encounters:   10/10/19 (!) 175/98   09/27/19 (!) 162/95   06/07/18 139/72     Today's Vitals: BP (!) 175/98 (Patient Position: Sitting)   Pulse 82   SpO2 97%     ASSESSMENT:                            Medication Adherence: excellent, no issues identified    Complex partial seizures: Needs improvement. Patient would benefit from repeat sodium level now that she has been on a lower dose of CBZ for 2 weeks. Sodium level ordered by Dr. Kendall and came back WNL at 136. Patient would benefit from following up with Office of Medical Cannabis to restart therapy. We also discussed that she could call the Medical Records  department at [facility] to get historical records of AEDs. It appears she has tried most of the older AEDs but she could consider a trial of a newer therapy like brivaracetam, which would likely be better tolerated. The patient is most fearful of side effects like nausea and severe rashes.     Hypertension: Needs improvement. BP has been elevated for the last few clinic visits. Patient would benefit from follow up with her PCP regarding BP management. It appears her PCP was seeking advice on which antihypertensives would be safe given her seizure risk. I would recommend an ACE inhibitor or ARB, which should not lower seizure threshold or interact with current medications.     Vitamins: Stable.     PLAN:                            1. Patient to recheck sodium level today at lab.  2. Patient to call Office of Medical Cannabis at [phone] to complete re-certification process. - this is now in process  3. Patient to call MRO (Medical Records) at [phone] for history of anti-epileptic medications she has been on in the past. - this is now in process  4. BP follow up with PCP; consider addition of an ACE inhibitor (ie: lisinopril) or ARB (ie: losartan).   Addendum 10/11/19: Left voicemail with patient's PCP Dr. Leanna Floyd's clinic recommending one of the above medications.     Per discussion with Dr. Kendall, we will consider a trial of brivaracetam after she is back on medical cannabis. Dr. Kendall also requested that we check a CBZ level and this was ordered.      I spent 30 minutes with this patient today. I offer these suggestions for consideration by Dr. Kendall. A copy of the visit note was provided to the patient's referring provider.    Will follow up after medical records are back from [facility].    The patient was given a summary of these recommendations as an after visit summary.     Kailey Gutierrez, Pharm.D.  Medication Therapy Management Pharmacist  Phone: [phone]

## 2019-10-11 DIAGNOSIS — G40.109 LOCALIZATION-RELATED FOCAL EPILEPSY WITH SIMPLE PARTIAL SEIZURES (H): Primary | ICD-10-CM

## 2019-10-11 NOTE — PROGRESS NOTES
Ordered carbamazepine level per verbal order from Dr. Kendall.     Routing to Jan Cui, RN to inform patient to have this lab done.      Kailey Gutierrez, Pharm.D.  Medication Therapy Management Pharmacist  Phone: [phone]

## 2019-10-11 NOTE — Clinical Note
Hi Jan,Dr. Kendall asked me to order this lab test for the patient (I hope I did it correctly). Can you call and inform her to have it done as soon as possible? Either here or a local clinic closer to her?Thanks!Kailey Gutierrez, Pharm.D.Medication Therapy Management PharmacistPhone: [phone]

## 2019-10-14 ENCOUNTER — TELEPHONE (OUTPATIENT)
Dept: NEUROLOGY | Facility: CLINIC | Age: 54
End: 2019-10-14

## 2019-10-14 DIAGNOSIS — G40.109 LOCALIZATION-RELATED FOCAL EPILEPSY WITH SIMPLE PARTIAL SEIZURES (H): Primary | ICD-10-CM

## 2019-10-16 ENCOUNTER — TELEPHONE (OUTPATIENT)
Dept: PHARMACY | Facility: CLINIC | Age: 54
End: 2019-10-16

## 2019-10-16 NOTE — TELEPHONE ENCOUNTER
Received voicemail from FLORIAN Bonner from Dr. Leanna Floyd's office. She received my voicemail last week regarding the patient's elevated BP. Kailey states that they will reach out to the patient and offer her an appointment in their clinic.     Kailey Gutierrez, Pharm.D.  Medication Therapy Management Pharmacist  Phone: [phone]

## 2019-10-22 DIAGNOSIS — G40.109 LOCALIZATION-RELATED FOCAL EPILEPSY WITH SIMPLE PARTIAL SEIZURES (H): ICD-10-CM

## 2019-10-25 LAB
10OH-CARBAZEPINE SERPL-MCNC: <2 UG/ML (ref 10–35)
CARBAMAZEPINE EP FREE SERPL-MCNC: 0.6 UG/ML (ref 0.2–2)
CARBAMAZEPINE EP SERPL-MCNC: 1.4 UG/ML (ref 0.4–4)
CARBAMAZEPINE FREE SERPL-MCNC: 1.9 UG/ML (ref 0.6–4.2)
CARBAMAZEPINE SERPL-MCNC: 9.6 UG/ML (ref 4–12)

## 2019-10-28 ENCOUNTER — TELEPHONE (OUTPATIENT)
Dept: NEUROLOGY | Facility: CLINIC | Age: 54
End: 2019-10-28

## 2019-10-28 NOTE — TELEPHONE ENCOUNTER
Per Dr. Kendall's request, placed phone call to patient to confirm her sister's phone number ( sister helps with clinic visits and meds).    Patient indicates phone number is : [phone] which is in chart incorrectly.    Patient gain affirmed correct carbamazepine doses.

## 2020-01-14 ENCOUNTER — TELEPHONE (OUTPATIENT)
Dept: PHARMACY | Facility: CLINIC | Age: 55
End: 2020-01-14

## 2020-01-14 DIAGNOSIS — G40.209 PARTIAL SYMPTOMATIC EPILEPSY WITH COMPLEX PARTIAL SEIZURES, NOT INTRACTABLE, WITHOUT STATUS EPILEPTICUS (H): Primary | ICD-10-CM

## 2020-01-14 RX ORDER — LEVETIRACETAM 500 MG/1
TABLET ORAL
Qty: 120 TABLET | Refills: 5 | Status: SHIPPED | OUTPATIENT
Start: 2020-01-14 | End: 2020-05-26

## 2020-01-14 NOTE — LETTER
January 15, 2020      Mili Mane  [address] [place] [address]      Dear Mili,    As we discussed over the phone, we are going to start levetiracetam (Keppra) for your seizures. You will continue your other medications as-is. Here is how you will increase the levetiracetam over the next few months:     Week 1-2: Take 1 tablet (500 mg) once daily  Week 3-4: Take 1 tablet (500 mg) twice daily  Week 5-6: Take 1 tablet (500 mg) in the morning and 2 tablets (1000 mg) in the evening  Week 7-8: Take 2 tablets (1000 mg) twice daily    Please contact the neurology clinic at [phone] if you have any questions or concerns.         Yan duncan,  Kailye Gutierrez, Pharm.D.  Medication Therapy Management Pharmacist  Phone: [phone]

## 2020-01-14 NOTE — TELEPHONE ENCOUNTER
Dr. Kendall requested that I order levetiracetam for this patient. We will initiate levetiracetam with the following schedule:   Week 1-2: 500 mg once daily  Week 3-4: 500 mg twice daily  Week 5-6: 500 mg in the morning, 1000 mg in the evening  Week 7-8: 1000 mg twice daily    Symptoms of carbamazepine toxicity has occurred with coadministration of carbamazepine and levetiracetam. This can include symptoms of unsteadiness of gait, nystagmus, double vision, dizziness, nausea, and vomiting. If these symptoms occur, we may need to reduce the dose of carbamazepine.     Called and informed patient of the plan and answered her questions. She will monitor for side effects. I will call her to follow up on 2/13/20.     Kailey Gutierrez, Pharm.D.  Medication Therapy Management Pharmacist  Phone: [phone]

## 2020-02-04 DIAGNOSIS — G40.109 LOCALIZATION-RELATED FOCAL EPILEPSY WITH SIMPLE PARTIAL SEIZURES (H): ICD-10-CM

## 2020-02-04 RX ORDER — LORAZEPAM 1 MG/1
1 TABLET ORAL 2 TIMES DAILY
Qty: 60 TABLET | Refills: 3 | Status: SHIPPED | OUTPATIENT
Start: 2020-02-04 | End: 2020-06-02

## 2020-02-04 NOTE — TELEPHONE ENCOUNTER
[facility] Call Center    Phone Message    May a detailed message be left on voicemail: yes     Reason for Call: Medication Refill Request    Has the patient contacted the pharmacy for the refill? Yes   Name of medication being requested: LORazepam (ATIVAN) 1 MG tablet  Provider who prescribed the medication: Dr. Kendall  [facility] [place] [place] [facility] [address] [facility]  Date medication is needed: 2/4/20         Action Taken: Message routed to:  [facility] Neuro    Travel Screening: Not Applicable

## 2020-02-13 ENCOUNTER — ALLIED HEALTH/NURSE VISIT (OUTPATIENT)
Dept: PHARMACY | Facility: CLINIC | Age: 55
End: 2020-02-13
Payer: COMMERCIAL

## 2020-02-13 DIAGNOSIS — G40.209 PARTIAL SYMPTOMATIC EPILEPSY WITH COMPLEX PARTIAL SEIZURES, NOT INTRACTABLE, WITHOUT STATUS EPILEPTICUS (H): Primary | ICD-10-CM

## 2020-02-13 PROCEDURE — 99207 ZZC NO CHARGE LOS: CPT | Performed by: PHARMACIST

## 2020-02-13 NOTE — PROGRESS NOTES
"Clinical Consult                                                    Mili Mane is a 54 year old female called for a therapy management visit.  She was referred to me from Dr. Kendall.     Reason for Consult: follow up on levetiracetam    Discussion: Currently taking Carbatrol 400 mg twice daily and lorazepam 1 mg twice daily. She is also now taking levetiracetam 500 mg twice daily and titrating up to 1000 mg twice daily. She still takes medical cannabis as well. Since starting levetiracetam she endorses slight \"queasiness.\" Yesterday she had some nausea with the decrease in temperature. She is a little groggy in the mornings but this is tolerable (also avoiding coffee). Otherwise she is tolerating the medication well. No change in mood, though she does admit she is dealing with some stress with her father.  Patient states that she is concerned that with upcoming allergy season and the increase in pollen she might have more trouble with her stomach and headaches so she will be monitoring for these symptoms in the coming months.    Plan:  1. Continue titration of levetiracetam: Week 5-6 500 mg in the morning/1000 mg in the evening, Week 7-8 1000 mg twice daily.   2. Will call for follow up on 3/12/20 at 9:30 am   3. Encouarged patient to schedule follow up with Dr. Kendall in April 2020.    Kailey Gutierrez, Pharm.D.  Medication Therapy Management Pharmacist  Phone: [phone]  "

## 2020-03-12 ENCOUNTER — ALLIED HEALTH/NURSE VISIT (OUTPATIENT)
Dept: PHARMACY | Facility: CLINIC | Age: 55
End: 2020-03-12
Payer: COMMERCIAL

## 2020-03-12 DIAGNOSIS — G40.209 PARTIAL SYMPTOMATIC EPILEPSY WITH COMPLEX PARTIAL SEIZURES, NOT INTRACTABLE, WITHOUT STATUS EPILEPTICUS (H): Primary | ICD-10-CM

## 2020-03-12 PROCEDURE — 99207 ZZC NO CHARGE LOS: CPT | Performed by: PHARMACIST

## 2020-03-12 NOTE — PROGRESS NOTES
"Clinical Pharmacy Consult:                                                    Mili Mane is a 54 year old female called for a clinical pharmacist consult.  She was referred to me from Dr. Kendall.     Reason for Consult: follow up on levetriacetam    Discussion: Patient is currently taking Carbatrol 200 mg 2 tablets twice daily, levetiracetam 1000 mg twice daily, lorazepam 1 mg twice daily, and medical cannabis. She states that since our last call one month ago she has had 5 small seizures and 3 medium seizures, which is good for her. She is pleased with the effect of levetiracetam on this higher dose. She feels groggy in the mornings which makes it difficult to wake up but thinks this may be improving. She had a difficult week last week with it being the 1 year anniversary of her mother passing away but she states she \"made it through okay.\"    Of note, she has been trying to obtain paper records from [facility] since November 2019 and has so far been unsuccessfully in obtaining them.     Plan:  1. Will have patient schedule follow up with Dr. Kendall as soon as possible as it has been 6 months since she saw him last in clinic.     Kailey Gutierrez, Pharm.D.  Medication Therapy Management Pharmacist  Phone: [phone]    "

## 2020-03-30 ENCOUNTER — DOCUMENTATION ONLY (OUTPATIENT)
Dept: CARE COORDINATION | Facility: CLINIC | Age: 55
End: 2020-03-30

## 2020-05-26 ENCOUNTER — VIRTUAL VISIT (OUTPATIENT)
Dept: NEUROLOGY | Facility: CLINIC | Age: 55
End: 2020-05-26
Payer: COMMERCIAL

## 2020-05-26 DIAGNOSIS — G40.219 PARTIAL SYMPTOMATIC EPILEPSY WITH COMPLEX PARTIAL SEIZURES, INTRACTABLE, WITHOUT STATUS EPILEPTICUS (H): ICD-10-CM

## 2020-05-26 DIAGNOSIS — G40.109 LOCALIZATION-RELATED FOCAL EPILEPSY WITH SIMPLE PARTIAL SEIZURES (H): Primary | ICD-10-CM

## 2020-05-26 DIAGNOSIS — G40.209 PARTIAL SYMPTOMATIC EPILEPSY WITH COMPLEX PARTIAL SEIZURES, NOT INTRACTABLE, WITHOUT STATUS EPILEPTICUS (H): ICD-10-CM

## 2020-05-26 RX ORDER — CARBAMAZEPINE 200 MG/1
CAPSULE, EXTENDED RELEASE ORAL
Qty: 124 CAPSULE | Refills: 11 | Status: SHIPPED | OUTPATIENT
Start: 2020-05-26 | End: 2021-04-12

## 2020-05-26 RX ORDER — LEVETIRACETAM 500 MG/1
TABLET ORAL
Qty: 120 TABLET | Refills: 11 | Status: SHIPPED | OUTPATIENT
Start: 2020-05-26 | End: 2021-04-12

## 2020-05-26 NOTE — LETTER
"5/26/2020     RE: Mili Mane  [address]  [place] [address]     Dear Colleague,    Thank you for referring your patient, Mili Mane, to the [facility] NEUROLOGY at [facility]. Please see a copy of my visit note below.    Mili Mane is a 55 year old female who is being evaluated via a billable telephone visit.      The patient has been notified of following:     \"This telephone visit will be conducted via a call between you and your physician/provider. We have found that certain health care needs can be provided without the need for a physical exam.  This service lets us provide the care you need with a short phone conversation.  If a prescription is necessary we can send it directly to your pharmacy.  If lab work is needed we can place an order for that and you can then stop by our lab to have the test done at a later time.    Telephone visits are billed at different rates depending on your insurance coverage. During this emergency period, for some insurers they may be billed the same as an in-person visit.  Please reach out to your insurance provider with any questions.    If during the course of the call the physician/provider feels a telephone visit is not appropriate, you will not be charged for this service.\"    Patient has given verbal consent for Telephone visit?  YES    What phone number would you like to be contacted at? [phone]    TELEPHONE X 40 MIN    How would you like to obtain your AVS? Mail    BALAJI Tubbs          Service Date: 05/26/2020      TELEPHONE NOTE        Telephone visit for 35 minutes.        We had a telephone visit with Ms. 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  Her seizure control has actually been better since she was started on marijuana administered by the [facility].  She has been receiving this in the form of Cobalt oral suspension at 3 " mL twice a day in the morning and evening.  She says that on that, she did not have any seizures, which is very good for her.  She has been started on carbamazepine because she did drop her sodium on oxcarbazepine and now she is on Carbatrol, which she is on 2 capsules of 200 mg twice a day in the form of Carbatrol.  She is also on levetiracetam, which she is taking 1000 mg twice a day.        The last laboratories we have on her are from October, which was a carbamazepine level, which actually she has been on for a long time, and was 9.6.  The patient is listed as receiving oxcarbazepine, less than 2, but that may not correct as she did not receive that.        Her sodium was last obtained was on 10/10 and it was 136.  Prior to that, it was 01/26 and my last note on her is from that time in October and we had a visit actually on 09/19.  She has been on the same carbamazepine dose for a long time and her blood levels have been very good.  She has been getting [employer] medical marijuana and has been very successful.  She is on 3 mL twice a day, which she reports she is paying $400 a month.  She had a VNS, when she was working back then.      She had a series of seizures then, but she said that most recently this has been better.  She has a history of hypertension.  Her sodium has been marginal at 131 and that has been followed.  As mentioned, the last one was okay.  She has not experienced any signs of hyponatremia.        Now, she is experiencing some significant anxiety, both she and her sister, as their and father, who is 92 is showing signs of cognitive decline as well as health.  They have been working with him at home to keep him safe.  They are quite concerned.  We had a long talk about that.  She is not depressed or has any substantial problem at this point.  She had been seen by our pharmacy and we had put her on levetiracetam.  We have now seen her in followup.        She needs blood levels of the  medications done of the carbamazepine and levetiracetam.  These were renewed.  She needs a sodium to be sure it stayed well and she needs a lot of support for dealing with her father's illness.        His seizure control seems to be pretty good.  Perhaps the medical marijuana by [employer] is helping and we will have to recertify her for that.        We will keep in touch.         ALDEN DE LEON MD      cc:   Leanna Floyd MD

## 2020-05-26 NOTE — PROGRESS NOTES
"Mili Mane is a 55 year old female who is being evaluated via a billable telephone visit.      The patient has been notified of following:     \"This telephone visit will be conducted via a call between you and your physician/provider. We have found that certain health care needs can be provided without the need for a physical exam.  This service lets us provide the care you need with a short phone conversation.  If a prescription is necessary we can send it directly to your pharmacy.  If lab work is needed we can place an order for that and you can then stop by our lab to have the test done at a later time.    Telephone visits are billed at different rates depending on your insurance coverage. During this emergency period, for some insurers they may be billed the same as an in-person visit.  Please reach out to your insurance provider with any questions.    If during the course of the call the physician/provider feels a telephone visit is not appropriate, you will not be charged for this service.\"    Patient has given verbal consent for Telephone visit?  YES    What phone number would you like to be contacted at? [phone]    TELEPHONE X 40 MIN    How would you like to obtain your AVS? Mail    BALAJI Tubbs        "

## 2020-05-27 NOTE — PROGRESS NOTES
Service Date: 05/26/2020      TELEPHONE NOTE        Telephone visit for 35 minutes.        We had a telephone visit with Ms. 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  Her seizure control has actually been better since she was started on marijuana administered by the [facility].  She has been receiving this in the form of Cobalt oral suspension at 3 mL twice a day in the morning and evening.  She says that on that, she did not have any seizures, which is very good for her.  She has been started on carbamazepine because she did drop her sodium on oxcarbazepine and now she is on Carbatrol, which she is on 2 capsules of 200 mg twice a day in the form of Carbatrol.  She is also on levetiracetam, which she is taking 1000 mg twice a day.        The last laboratories we have on her are from October, which was a carbamazepine level, which actually she has been on for a long time, and was 9.6.  The patient is listed as receiving oxcarbazepine, less than 2, but that may not correct as she did not receive that.        Her sodium was last obtained was on 10/10 and it was 136.  Prior to that, it was 01/26 and my last note on her is from that time in October and we had a visit actually on 09/19.  She has been on the same carbamazepine dose for a long time and her blood levels have been very good.  She has been getting [employer] medical marijuana and has been very successful.  She is on 3 mL twice a day, which she reports she is paying $400 a month.  She had a VNS, when she was working back then.      She had a series of seizures then, but she said that most recently this has been better.  She has a history of hypertension.  Her sodium has been marginal at 131 and that has been followed.  As mentioned, the last one was okay.  She has not experienced any signs of hyponatremia.        Now, she is experiencing some significant anxiety, both she and her sister, as their  and father, who is 92 is showing signs of cognitive decline as well as health.  They have been working with him at home to keep him safe.  They are quite concerned.  We had a long talk about that.  She is not depressed or has any substantial problem at this point.  She had been seen by our pharmacy and we had put her on levetiracetam.  We have now seen her in followup.        She needs blood levels of the medications done of the carbamazepine and levetiracetam.  These were renewed.  She needs a sodium to be sure it stayed well and she needs a lot of support for dealing with her father's illness.        His seizure control seems to be pretty good.  Perhaps the medical marijuana by [employer] is helping and we will have to recertify her for that.        We will keep in touch.      cc:   MD ALDEN Stuart MD             D: 2020   T: 2020   MT: DAVIS      Name:     KELY JAMES   MRN:      [id number]        Account:      [id number]   :      1965           Service Date: 2020      Document: [id number]

## 2020-06-02 DIAGNOSIS — G40.109 LOCALIZATION-RELATED FOCAL EPILEPSY WITH SIMPLE PARTIAL SEIZURES (H): ICD-10-CM

## 2020-06-02 RX ORDER — LORAZEPAM 1 MG/1
1 TABLET ORAL 2 TIMES DAILY
Qty: 60 TABLET | Refills: 3 | Status: SHIPPED | OUTPATIENT
Start: 2020-06-02 | End: 2021-02-11

## 2020-06-02 NOTE — TELEPHONE ENCOUNTER
Rx Authorization:    Requested Medication/ Dose:1mg    Date last refill ordered: 2/4/20    Quantity ordered: 60tabs    # refills: 3    Date of last clinic visit with ordering provider: 9/27/19    Date of next clinic visit with ordering provider: f/u 6 months    All pertinent protocol data (lab date/result):     Include pertinent information from patients message:

## 2020-06-19 ENCOUNTER — TRANSFERRED RECORDS (OUTPATIENT)
Dept: HEALTH INFORMATION MANAGEMENT | Facility: CLINIC | Age: 55
End: 2020-06-19

## 2020-07-02 ENCOUNTER — TELEPHONE (OUTPATIENT)
Dept: NEUROLOGY | Facility: CLINIC | Age: 55
End: 2020-07-02

## 2021-02-11 DIAGNOSIS — G40.109 LOCALIZATION-RELATED FOCAL EPILEPSY WITH SIMPLE PARTIAL SEIZURES (H): ICD-10-CM

## 2021-02-11 RX ORDER — LORAZEPAM 1 MG/1
1 TABLET ORAL 2 TIMES DAILY
Qty: 60 TABLET | Refills: 5 | Status: SHIPPED | OUTPATIENT
Start: 2021-02-11 | End: 2021-08-31

## 2021-02-11 NOTE — TELEPHONE ENCOUNTER
LORazepam (ATIVAN) 1 MG tablet  Last Written Prescription Date:  6/2/2020  Last Fill Quantity: 60,   # refills: 3  Last Office Visit : 5/26/2020  Future Office visit:  None    Routing refill request to provider for review/approval because:  Drug not on the [facility], P or [facility] refill protocol or controlled substance      Olga Sanchez RN  Central Triage Red Flags/Med Refills

## 2021-02-19 ENCOUNTER — TELEPHONE (OUTPATIENT)
Dept: NEUROLOGY | Facility: CLINIC | Age: 56
End: 2021-02-19

## 2021-02-19 NOTE — TELEPHONE ENCOUNTER
Prior Authorization Retail Medication Request    Medication/Dose: Carbatrol 200 mg 12hr   ICD code (if different than what is on RX):  Partial symptomatic epilepsy with complex partial seizures, not intractable, without status epilepticus (H) [G40.209]     Previously Tried and Failed:   Rationale:     Insurance Name:  [employer]  Insurance ID:  [id number]       Pharmacy Information (if different than what is on RX)  Name:  [facility]   Phone:  [phone]

## 2021-02-23 ENCOUNTER — TELEPHONE (OUTPATIENT)
Dept: NEUROLOGY | Facility: CLINIC | Age: 56
End: 2021-02-23

## 2021-02-23 NOTE — TELEPHONE ENCOUNTER
Prior Authorization Retail Medication Request     Medication/Dose: Carbatrol 200 mg 12hr   ICD code (if different than what is on RX):  Partial symptomatic epilepsy with complex partial seizures, not intractable, without status epilepticus (H) [G40.209]      Previously Tried and Failed:   Rationale:      Insurance Name:  [employer]  Insurance ID:  [id number]         Pharmacy Information (if different than what is on RX)  Name:  [facility]   Phone:  [phone]

## 2021-02-25 NOTE — TELEPHONE ENCOUNTER
Prior Authorization Approval    Authorization Effective Date: 1/26/2021  Authorization Expiration Date: 2/25/2022  Medication: CARBATROL 200 MG 12 hr capsule--APPROVED  Approved Dose/Quantity:   Reference #:     Insurance Company: [employer] - Phone [phone] Fax [phone]  Expected CoPay:       CoPay Card Available:      Foundation Assistance Needed:    Which Pharmacy is filling the prescription (Not needed for infusion/clinic administered): [facility] - [place], [place] - [address]  Pharmacy Notified: Yes  Patient Notified: Yes **Instructed pharmacy to notify patient when script is ready to /ship.**

## 2021-02-25 NOTE — TELEPHONE ENCOUNTER
PA Initiation    Medication: CARBATROL 200 MG 12 hr capsule  Insurance Company: [employer] - Phone [phone] Fax [phone]  Pharmacy Filling the Rx: [facility] - [place], [place] - [facility] [address]  Filling Pharmacy Phone: [phone]  Filling Pharmacy Fax: [phone]  Start Date: 2/24/2021

## 2021-04-08 DIAGNOSIS — G40.209 PARTIAL SYMPTOMATIC EPILEPSY WITH COMPLEX PARTIAL SEIZURES, NOT INTRACTABLE, WITHOUT STATUS EPILEPTICUS (H): Primary | ICD-10-CM

## 2021-04-12 RX ORDER — CARBAMAZEPINE 200 MG/1
CAPSULE, EXTENDED RELEASE ORAL
Qty: 124 CAPSULE | Refills: 0 | Status: SHIPPED | OUTPATIENT
Start: 2021-04-12 | End: 2021-05-28

## 2021-04-12 RX ORDER — LEVETIRACETAM 500 MG/1
TABLET ORAL
Qty: 120 TABLET | Refills: 0 | Status: SHIPPED | OUTPATIENT
Start: 2021-04-12 | End: 2021-05-28

## 2021-05-28 ENCOUNTER — TELEPHONE (OUTPATIENT)
Dept: NEUROLOGY | Facility: CLINIC | Age: 56
End: 2021-05-28

## 2021-05-28 DIAGNOSIS — G40.209 PARTIAL SYMPTOMATIC EPILEPSY WITH COMPLEX PARTIAL SEIZURES, NOT INTRACTABLE, WITHOUT STATUS EPILEPTICUS (H): ICD-10-CM

## 2021-05-28 RX ORDER — LEVETIRACETAM 500 MG/1
TABLET ORAL
Qty: 120 TABLET | Refills: 11 | Status: SHIPPED | OUTPATIENT
Start: 2021-05-28 | End: 2021-08-31

## 2021-05-28 RX ORDER — CARBAMAZEPINE 200 MG/1
CAPSULE, EXTENDED RELEASE ORAL
Qty: 124 CAPSULE | Refills: 0 | Status: SHIPPED | OUTPATIENT
Start: 2021-05-28 | End: 2021-07-21

## 2021-05-28 RX ORDER — CARBAMAZEPINE 200 MG/1
CAPSULE, EXTENDED RELEASE ORAL
Qty: 124 CAPSULE | Refills: 4 | Status: CANCELLED | OUTPATIENT
Start: 2021-05-28

## 2021-07-19 DIAGNOSIS — G40.209 PARTIAL SYMPTOMATIC EPILEPSY WITH COMPLEX PARTIAL SEIZURES, NOT INTRACTABLE, WITHOUT STATUS EPILEPTICUS (H): ICD-10-CM

## 2021-07-21 NOTE — TELEPHONE ENCOUNTER
CARBATROL 200 MG 12 hr capsule  TAKE TWO CAPSULES BY MOUTH EVERY MORNING AND TAKE TWO CAPSULES BY MOUTH EVERY EVENING Last Written Prescription Date:  5/28/21  Last Fill Quantity: 124,   # refills: 0  Last Office Visit : 5/26/20  Future Office visit:  None    26 months: AED Level, Expoxide (PRN), CBC   18 months: Sodium     Routing refill request to provider for review/approval because:    Overdue visit and labs: outside lab - CBC  11/26/18 ; Na+ 10/10/19     10/22/19  Carbamazepine Total Level 4.0 - 12.0 ug/mL 9.6      10, 11 Epoxide Level 0.4 - 4.0 ug/mL 1.4     Pended. Routed to Provider.     Scheduling has been notified to contact the pt for appointment.

## 2021-07-22 RX ORDER — CARBAMAZEPINE 200 MG/1
CAPSULE, EXTENDED RELEASE ORAL
Qty: 124 CAPSULE | Refills: 11 | Status: SHIPPED | OUTPATIENT
Start: 2021-07-22 | End: 2021-08-31

## 2021-08-31 ENCOUNTER — VIRTUAL VISIT (OUTPATIENT)
Dept: NEUROLOGY | Facility: CLINIC | Age: 56
End: 2021-08-31
Payer: COMMERCIAL

## 2021-08-31 DIAGNOSIS — G40.109 LOCALIZATION-RELATED FOCAL EPILEPSY WITH SIMPLE PARTIAL SEIZURES (H): ICD-10-CM

## 2021-08-31 DIAGNOSIS — G40.209 PARTIAL SYMPTOMATIC EPILEPSY WITH COMPLEX PARTIAL SEIZURES, NOT INTRACTABLE, WITHOUT STATUS EPILEPTICUS (H): ICD-10-CM

## 2021-08-31 PROCEDURE — 99213 OFFICE O/P EST LOW 20 MIN: CPT | Mod: 95 | Performed by: PSYCHIATRY & NEUROLOGY

## 2021-08-31 RX ORDER — CARBAMAZEPINE 200 MG/1
CAPSULE, EXTENDED RELEASE ORAL
Qty: 124 CAPSULE | Refills: 11 | Status: SHIPPED | OUTPATIENT
Start: 2021-08-31 | End: 2021-12-30

## 2021-08-31 RX ORDER — LEVETIRACETAM 500 MG/1
TABLET ORAL
Qty: 120 TABLET | Refills: 11 | Status: SHIPPED | OUTPATIENT
Start: 2021-08-31 | End: 2022-01-26

## 2021-08-31 RX ORDER — LORAZEPAM 1 MG/1
1 TABLET ORAL 2 TIMES DAILY
Qty: 60 TABLET | Refills: 5 | Status: SHIPPED | OUTPATIENT
Start: 2021-08-31 | End: 2022-01-26

## 2021-08-31 NOTE — LETTER
8/31/2021       RE: Mili Mane  [address]  [place] [address]     Dear Colleague,    Thank you for referring your patient, Mili Mane, to the [facility] NEUROLOGY CLINIC [facility]. Please see a copy of my visit note below.    Mili is a 56 year old who is being evaluated via a billable telephone visit.      What phone number would you like to be contacted at? [phone]  How would you like to obtain your AVS? Kelseyhart  Phone call duration: 25 minutes        Again, thank you for allowing me to participate in the care of your patient.      Sincerely,    Barney Kendall MD

## 2021-08-31 NOTE — PROGRESS NOTES
Service Date: 2021    Leanna Floyd MD        RE:       Mili Mane    MRN:   [id number]    :    1965       Dear Dr. Floyd:      Once again, we had the privilege of doing a virtual visit through the telephone for 25 minutes on Ms. Mili Mane, a 56-year-old, with intractable and partial epilepsy under my care for a number of years.      Actually, Ms. Mane is doing better than she had in a while only having 10 episodes in the last month, 2 of which were hard, but the rest were mild.  We attributed this to the polytherapy she is on.  This polytherapy includes a VNS which she is not sure, but thinks may not be working right as it has been 4 years since she was last changed, but I pointed out that it should intend to work for 10 years.  We will check it, but because of the pandemic we have had limited office visits, but will plan to put her on the list.    She is otherwise well.  Her blood pressure has always been an issue with her, and she is working to get that better.  Her last reading was 175/98.  She is caring for her ailing father is 93 who is a very nice man that I met on multiple locations.    She has had no other medical issues and control is relatively good, all things considered.  She has not listed any new medication.  She is on Keppra 1000 twice a day, Carbatrol 200 mg with 400 in the morning and 400 in the evening and lorazepam 1 mg a day.  She is also on medical cannabis.  She gets that through one of the local outfits and says that has helped her immensely.      On exam over the phone, she seems fine.  She is her usual self.  No cognitive issues.  Blood pressure is elevated at 175/98.    In summary, she is doing very well.  We will keep her on the same medications.  She will have blood levels at your office and we have renewed her medications appropriately.    Sincerely,     Barney Kenadll MD        D: 2021   T: 2021   MT: jan/TAYLOR    Name:     ERIKA  KELY CARUSO  MRN:      [id number]        Account:      [id number]   :      1965           Service Date: 2021       Document: [id number]

## 2021-08-31 NOTE — LETTER
2021       RE: Mili Mane  [address]  [place] [address]     Dear Colleague,    Thank you for referring your patient, Mili Mane, to the [facility] NEUROLOGY CLINIC [facility]. Please see a copy of my visit note below.      Service Date: 2021    Leanna Floyd MD      RE:       Mili Mane    MRN:   [id number]    :    1965       Dear Dr. Floyd:      Once again, we had the privilege of doing a virtual visit through the telephone for 25 minutes on Ms. Mili Mane, a 56-year-old, with intractable and partial epilepsy under my care for a number of years.      Actually, Ms. Mane is doing better than she had in a while only having 10 episodes in the last month, 2 of which were hard, but the rest were mild.  We attributed this to the polytherapy she is on.  This polytherapy includes a VNS which she is not sure, but thinks may not be working right as it has been 4 years since she was last changed, but I pointed out that it should intend to work for 10 years.  We will check it, but because of the pandemic we have had limited office visits, but will plan to put her on the list.    She is otherwise well.  Her blood pressure has always been an issue with her, and she is working to get that better.  Her last reading was 175/98.  She is caring for her ailing father is 93 who is a very nice man that I met on multiple locations.    She has had no other medical issues and control is relatively good, all things considered.  She has not listed any new medication.  She is on Keppra 1000 twice a day, Carbatrol 200 mg with 400 in the morning and 400 in the evening and lorazepam 1 mg a day.  She is also on medical cannabis.  She gets that through one of the local outfits and says that has helped her immensely.      On exam over the phone, she seems fine.  She is her usual self.  No cognitive issues.  Blood  pressure is elevated at 175/98.    In summary, she is doing very well.  We will keep her on the same medications.  She will have blood levels at your office and we have renewed her medications appropriately.    Sincerely,     Barney Kendall MD

## 2021-08-31 NOTE — PROGRESS NOTES
Mili is a 56 year old who is being evaluated via a billable telephone visit.      What phone number would you like to be contacted at? [phone]  How would you like to obtain your AVS? Irena  Phone call duration: 25 minutes

## 2021-12-13 ENCOUNTER — VIRTUAL VISIT (OUTPATIENT)
Dept: NEUROLOGY | Facility: CLINIC | Age: 56
End: 2021-12-13
Payer: COMMERCIAL

## 2021-12-13 DIAGNOSIS — G40.109 LOCALIZATION-RELATED FOCAL EPILEPSY WITH SIMPLE PARTIAL SEIZURES (H): Primary | ICD-10-CM

## 2021-12-13 PROCEDURE — 99214 OFFICE O/P EST MOD 30 MIN: CPT | Mod: 95 | Performed by: PSYCHIATRY & NEUROLOGY

## 2021-12-13 RX ORDER — ATENOLOL 25 MG/1
25 TABLET ORAL EVERY EVENING
Status: ON HOLD | COMMUNITY

## 2021-12-13 NOTE — PROGRESS NOTES
Service Date: 2021    Leanna Floyd MD  [facility]    RE:  Mili Mane  MRN:  [id number]  :  1965    Dear Dr. Floyd:    We had the privilege of doing a video visit today with Caty and her sister, Patricia.  She was interested in discussing today some future living arrangements for Caty.  This caught me a little surprised about this.  Caty has been living with her father who is elderly now at 93, although does not have medical care and is failing, but still hanging in strong.  No major health issues.  Caty has had seizures for many years and I have followed her for a long time.  She is still having brief episodes, but feels that her seizure control is much improved with a combination of Carbatrol and medical marijuana.    She is still having 15 seizures in a month, but 20 days of these have been seizure free to varying degrees.  She does take occasional Ativan for these when they occur in clusters or for sedation.  She has a VNS in place as well.  She has been on a dose of Carbatrol 400 mg in the morning and 400 in the evening.  She is also on levetiracetam and this is in a dose of 1000 twice a day and she takes the Ativan as I said 1 mg twice a day as needed for seizures.  She has hypertension, is on atenolol and she is on the medical marijuana  recently I recertified her.    The last concentrations we have of these 2 medications are carbamazepine 9.6 with epoxide of 1.4 and she does not register a Keppra dose.  I believe the Keppra had been discontinued, although it still shows on her chart.    Previous levels of the carbamazepine have gone as high as 16.5, but she really has not been symptomatic from them.    I will okay the Carbatrol and I believe the check on the levetiracetam to see that she is taking it.  She is reporting seizures are much improved on the combination of the medical marijuana and the Carbatrol, but she does not mention levetiracetam.  There have not been any  particular new issues.  She has been reluctant in the past to try a different medication and that has been a factor in other trials.  The VNS needs to be checked and that will be arranged for the next visit as this was a video visit.  A big issue today is her living arrangement.  Her mother used to take care of things at home and cook for Caty due to her concerns she would get burned on the stove, but her mother  and now she is with her father and her sisters are trying to find a living arrangements for Caty and possibly her father.  Her father needs to have a skill assessment done and they are going to procure that through a physician for him.  As far as  Caty is concerned, she says she has never cooked because of this concern.  Of course, she does not drive and she does not go out.  She has had blood tests done at [facility] in [place] with you and we recommended that the levels be obtained for Carbatrol.    Regarding the issue of her living arrangements, we will ask for independent living assessment through physical therapy to do an evaluation and see where she falls in terms of her ability to be by herself and her father.  I told them this would take some time.  They have not talked about this before, so it is brand new and I know that I will be leaving at the end of December.  I recommended Dr. Hernandez follow through on the living arrangement situation and see them in 6 months or sooner.  In the meantime, he can proceed with the evaluations and because of the father's illness, this is going to take unfortunately some time.  I do not think that Caty is in any imminent danger and her mother had  a few years ago.  I will see this through and see what the evaluation reports.  She needs blood levels, and she will be going to the [facility] to have this done.    We will see how things evolve as far as the living arrangements, but this is going to require some time and possibly  will be involved,  but she is a vulnerable adult.    Sincerely,    Barney Kendall MD        D: 2021   T: 2021   MT: AMALIA    Name:     KELY JAMES  MRN:      [id number]        Account:      [id number]   :      1965           Service Date: 2021       Document: [id number]

## 2021-12-13 NOTE — PROGRESS NOTES
Mili is a 56 year old who is being evaluated via a billable video visit.        Video-Visit Details x 45 min        Originating Location (pt. Location): Home    Distant Location (provider location):  [facility] NEUROLOGY CLINIC [place]     Platform used for Video Visit: Jb   Spoke with her primary Dr Leanna Floyd and she will be following in her living situation and getting her some help at home. Aware of kip vazquez

## 2021-12-13 NOTE — LETTER
2021      RE: Mili Mane  [address]  [place] [address]         Originating Location (pt. Location): Home    Distant Location (provider location):  [facility] NEUROLOGY [facility]INIC [place]     Platform used for Video Visit: Jb   Spoke with her primary Dr Leanna Floyd and she will be following in her living situation and getting her some help at home. Aware of eval. fiol      Service Date: 2021    Leanna Floyd MD  [facility]    RE:  Mili Mane  MRN:  [id number]  :  1965    Dear Dr. Floyd:    We had the privilege of doing a video visit today with Caty and her sister, Patricia.  She was interested in discussing today some future living arrangements for Caty.  This caught me a little surprised about this.  Caty has been living with her father who is elderly now at 93, although does not have medical care and is failing, but still hanging in strong.  No major health issues.  Caty has had seizures for many years and I have followed her for a long time.  She is still having brief episodes, but feels that her seizure control is much improved with a combination of Carbatrol and medical marijuana.    She is still having 15 seizures in a month, but 20 days of these have been seizure free to varying degrees.  She does take occasional Ativan for these when they occur in clusters or for sedation.  She has a VNS in place as well.  She has been on a dose of Carbatrol 400 mg in the morning and 400 in the evening.  She is also on levetiracetam and this is in a dose of 1000 twice a day and she takes the Ativan as I said 1 mg twice a day as needed for seizures.  She has hypertension, is on atenolol and she is on the medical marijuana  recently I recertified her.    The last concentrations we have of these 2 medications are carbamazepine 9.6 with epoxide of 1.4 and she does not register a Keppra dose.  I believe the Keppra had been discontinued, although it still shows on her  chart.    Previous levels of the carbamazepine have gone as high as 16.5, but she really has not been symptomatic from them.    I will okay the Carbatrol and I believe the check on the levetiracetam to see that she is taking it.  She is reporting seizures are much improved on the combination of the medical marijuana and the Carbatrol, but she does not mention levetiracetam.  There have not been any particular new issues.  She has been reluctant in the past to try a different medication and that has been a factor in other trials.  The VNS needs to be checked and that will be arranged for the next visit as this was a video visit.  A big issue today is her living arrangement.  Her mother used to take care of things at home and cook for Caty due to her concerns she would get burned on the stove, but her mother  and now she is with her father and her sisters are trying to find a living arrangements for Caty and possibly her father.  Her father needs to have a skill assessment done and they are going to procure that through a physician for him.  As far as  Caty is concerned, she says she has never cooked because of this concern.  Of course, she does not drive and she does not go out.  She has had blood tests done at [facility] in [place] with you and we recommended that the levels be obtained for Carbatrol.    Regarding the issue of her living arrangements, we will ask for independent living assessment through physical therapy to do an evaluation and see where she falls in terms of her ability to be by herself and her father.  I told them this would take some time.  They have not talked about this before, so it is brand new and I know that I will be leaving at the end of December.  I recommended Dr. Hernandez follow through on the living arrangement situation and see them in 6 months or sooner.  In the meantime, he can proceed with the evaluations and because of the father's illness, this is going to take unfortunately some  time.  I do not think that Caty is in any imminent danger and her mother had  a few years ago.  I will see this through and see what the evaluation reports.  She needs blood levels, and she will be going to the [facility] to have this done.    We will see how things evolve as far as the living arrangements, but this is going to require some time and possibly  will be involved, but she is a vulnerable adult.    Sincerely,    Barney Kendall MD        D: 2021   T: 2021   MT: AMALIA    Name:     KELY JAMES  MRN:      [id number]        Account:      [id number]   :      1965           Service Date: 2021       Document: [id number]

## 2021-12-13 NOTE — LETTER
12/13/2021       RE: Mili Mane  [address]  [place] [address]     Dear Colleague,    Thank you for referring your patient, Mili Mane, to the [facility] NEUROLOGY [facility]INIC [place] [facility]. Please see a copy of my visit note below.    Mili is a 56 year old who is being evaluated via a billable video visit.        Video-Visit Details x 45 min        Originating Location (pt. Location): Home    Distant Location (provider location):  [facility] NEUROLOGY [facility]     Platform used for Video Visit: Jb   Spoke with her primary Dr Leanna Floyd and she will be following in her living situation and getting her some help at home. Aware of kip vazquez        Again, thank you for allowing me to participate in the care of your patient.      Sincerely,    Barney Vazquez MD

## 2021-12-15 DIAGNOSIS — G40.109 LOCALIZATION-RELATED FOCAL EPILEPSY WITH SIMPLE PARTIAL SEIZURES (H): Primary | ICD-10-CM

## 2021-12-16 ENCOUNTER — CARE COORDINATION (OUTPATIENT)
Dept: NEUROLOGY | Facility: CLINIC | Age: 56
End: 2021-12-16
Payer: COMMERCIAL

## 2021-12-16 DIAGNOSIS — G40.109 LOCALIZATION-RELATED FOCAL EPILEPSY WITH SIMPLE PARTIAL SEIZURES (H): Primary | ICD-10-CM

## 2021-12-30 DIAGNOSIS — G40.209 PARTIAL SYMPTOMATIC EPILEPSY WITH COMPLEX PARTIAL SEIZURES, NOT INTRACTABLE, WITHOUT STATUS EPILEPTICUS (H): ICD-10-CM

## 2021-12-30 RX ORDER — CARBAMAZEPINE 200 MG/1
CAPSULE, EXTENDED RELEASE ORAL
Qty: 360 CAPSULE | Refills: 4 | Status: SHIPPED | OUTPATIENT
Start: 2021-12-30 | End: 2022-01-26

## 2022-01-07 ENCOUNTER — TELEPHONE (OUTPATIENT)
Dept: NEUROLOGY | Facility: CLINIC | Age: 57
End: 2022-01-07

## 2022-01-07 DIAGNOSIS — G40.219 PARTIAL SYMPTOMATIC EPILEPSY WITH COMPLEX PARTIAL SEIZURES, INTRACTABLE, WITHOUT STATUS EPILEPTICUS (H): Primary | ICD-10-CM

## 2022-01-07 NOTE — TELEPHONE ENCOUNTER
Previously messaged Dr. Kendall about Carbatrol Level which had come back at 7.8 (Tegretol Total). Received return message indicating patient should retest in 1 week and should be asked about compliance.    Spoke with patient who indicated surprise and did not seem to be aware of any lapses when asked.    Patient will return to her home clinic ([facility]) on 1/21/22 and a lab order will be faxed to them at fax # [phone] for a redraw at that time.    No other questions from patient.     Jan Cui RN

## 2022-01-26 ENCOUNTER — OFFICE VISIT (OUTPATIENT)
Dept: NEUROLOGY | Facility: CLINIC | Age: 57
End: 2022-01-26
Payer: COMMERCIAL

## 2022-01-26 VITALS
SYSTOLIC BLOOD PRESSURE: 168 MMHG | WEIGHT: 149.8 LBS | HEIGHT: 65 IN | BODY MASS INDEX: 24.96 KG/M2 | DIASTOLIC BLOOD PRESSURE: 81 MMHG | TEMPERATURE: 98.6 F | HEART RATE: 75 BPM

## 2022-01-26 DIAGNOSIS — G40.209 PARTIAL SYMPTOMATIC EPILEPSY WITH COMPLEX PARTIAL SEIZURES, NOT INTRACTABLE, WITHOUT STATUS EPILEPTICUS (H): ICD-10-CM

## 2022-01-26 DIAGNOSIS — G40.219 PARTIAL SYMPTOMATIC EPILEPSY WITH COMPLEX PARTIAL SEIZURES, INTRACTABLE, WITHOUT STATUS EPILEPTICUS (H): Primary | ICD-10-CM

## 2022-01-26 DIAGNOSIS — G40.109 LOCALIZATION-RELATED FOCAL EPILEPSY WITH SIMPLE PARTIAL SEIZURES (H): ICD-10-CM

## 2022-01-26 LAB
ALT SERPL W P-5'-P-CCNC: 23 U/L (ref 0–50)
AST SERPL W P-5'-P-CCNC: 17 U/L (ref 0–45)
BASOPHILS # BLD AUTO: 0.1 10E3/UL (ref 0–0.2)
BASOPHILS NFR BLD AUTO: 1 %
EOSINOPHIL # BLD AUTO: 0.1 10E3/UL (ref 0–0.7)
EOSINOPHIL NFR BLD AUTO: 1 %
ERYTHROCYTE [DISTWIDTH] IN BLOOD BY AUTOMATED COUNT: 12 % (ref 10–15)
HCT VFR BLD AUTO: 38.3 % (ref 35–47)
HGB BLD-MCNC: 12.8 G/DL (ref 11.7–15.7)
IMM GRANULOCYTES # BLD: 0 10E3/UL
IMM GRANULOCYTES NFR BLD: 1 %
LYMPHOCYTES # BLD AUTO: 1.8 10E3/UL (ref 0.8–5.3)
LYMPHOCYTES NFR BLD AUTO: 20 %
MCH RBC QN AUTO: 29.5 PG (ref 26.5–33)
MCHC RBC AUTO-ENTMCNC: 33.4 G/DL (ref 31.5–36.5)
MCV RBC AUTO: 88 FL (ref 78–100)
MONOCYTES # BLD AUTO: 0.6 10E3/UL (ref 0–1.3)
MONOCYTES NFR BLD AUTO: 7 %
NEUTROPHILS # BLD AUTO: 6.3 10E3/UL (ref 1.6–8.3)
NEUTROPHILS NFR BLD AUTO: 70 %
NRBC # BLD AUTO: 0 10E3/UL
NRBC BLD AUTO-RTO: 0 /100
PLATELET # BLD AUTO: 402 10E3/UL (ref 150–450)
RBC # BLD AUTO: 4.34 10E6/UL (ref 3.8–5.2)
SODIUM SERPL-SCNC: 134 MMOL/L (ref 133–144)
WBC # BLD AUTO: 8.8 10E3/UL (ref 4–11)

## 2022-01-26 RX ORDER — LORAZEPAM 0.5 MG/1
TABLET ORAL
Qty: 60 TABLET | Refills: 5 | Status: SHIPPED | OUTPATIENT
Start: 2022-01-26 | End: 2022-02-08

## 2022-01-26 RX ORDER — LEVETIRACETAM 500 MG/1
TABLET ORAL
Qty: 450 TABLET | Refills: 3 | Status: SHIPPED | OUTPATIENT
Start: 2022-01-26 | End: 2022-01-27

## 2022-01-26 RX ORDER — CARBAMAZEPINE 200 MG/1
CAPSULE, EXTENDED RELEASE ORAL
Qty: 360 CAPSULE | Refills: 3 | Status: SHIPPED | OUTPATIENT
Start: 2022-01-26 | End: 2022-01-27

## 2022-01-26 ASSESSMENT — MIFFLIN-ST. JEOR: SCORE: 1257.43

## 2022-01-26 ASSESSMENT — PAIN SCALES - GENERAL: PAINLEVEL: NO PAIN (0)

## 2022-01-26 NOTE — PROGRESS NOTES
"[facility]       Patient:  Mili Mane  :  1965   Age:  56 year old   Today's Office Visit:  2022    Referring Provider:    Barney Kendall MD  [address] [facility] [address]  [place] [address]    History of Present Illness:  Mili Mane is 56 year old right handed who presents with history epilepsy. Accompanied with sister Patricia. She followed with Dr. Kendall for 33 years. Her seizure started at age 18 months, early on she had \"petit mal\" which continued to age 11.  When she was born she had \" some oxygen issues at birth, then as infant she had recurrent fever\". Early childhood she was on phenobarbital, phenytoin, mysoline and she continued to have starting spells several time per week.  It seems over the years she has tried several seizure medications but continues to have high seizure burden.  Her recurrent spells seem consistent with focal impaired seizures.  Currently on antiepileptic drug she has fatigue, has brain fog, does have brain fog.   Seizure type 1: Aura of ringing in ear, stares, last 30 seconds, started 18 months. Per Patricia she has a \"blank stare for minutes\". No automatisms. She calls these \"small\" she describes them as \"I have loss of awareness, I do not get hurt, and I do not fall. She has 3-10 per month based on seizure. She describes another type of seizure as \"medium\" seizures in which she has 0-1 per year.   Seizure type 2: generalized tonic-clonic convulsion - whole body stiffening and shaking, she bites her cheek, may she had under 10 in her lifetime. Last \"hard seizure\" was 2021. In 2021 she was walking her dog, she fell and was confused.     Current antiepileptic drugs:   Levetiracetam 1000 mg twice a day   Carbatrol 400 mg twice a day     Epilepsy Risk Factors:  Patient has no history of encephalitis/meningitis, no history of stroke, no history of tumor, no history of traumatic brain injury.  The patient had a normal birth " "and delivery.  No developmental delays noted.  No febrile seizures.  No family members with epilepsy.     Precipitating factors:   Sleep Deprivation, Stress and Failure to take AED  Current Outpatient Medications   Medication Sig Dispense Refill     atenolol (TENORMIN) 25 MG tablet Take 25 mg by mouth daily       atenolol (TENORMIN) 50 MG tablet Take 50 mg by mouth daily       CARBATROL 200 MG 12 hr capsule TAKE TWO CAPSULES BY MOUTH EVERY MORNING AND TAKE TWO CAPSULES BY MOUTH EVERY EVENING 360 capsule 4     levETIRAcetam (KEPPRA) 500 MG tablet TAKE TWO TABLETS BY MOUTH TWICE A  tablet 11     LORazepam (ATIVAN) 1 MG tablet Take 1 tablet (1 mg) by mouth 2 times daily (Patient taking differently: Take 1 mg by mouth daily ) 60 tablet 5     medical cannabis (Patient's own supply) See Admin Instructions (The purpose of this order is to document that the patient reports taking medical cannabis.  This is not a prescription, and is not used to certify that the patient has a qualifying medical condition.)       Multiple Vitamins-Minerals (CENTRUM SILVER) per tablet Take 1 tablet by mouth daily       Perceived AED Side Effects: Yes  Medication Notes:   AED Medication Compliance:  compliant most of the time  Using a pill box:  Yes  Past AEDs:    Levetiracetam - started in 2012. She is not sure if it helps her seizures.   Carbatrol - started at age 1.   Celontin - \"thorat starting closing and had to go to hospital and placed on breathing\"  Phenytoin  - rash with phenytoin    Mysoline   Phenobarbital     AED - ANTIEPILEPTIC DRUGS 12/30/2021   levETIRAcetam (Oral) TAKE TWO TABLETS BY MOUTH TWICE A DAY (500 MG TABS)   Lacosamide (Oral) -   Carbatrol (Oral) TAKE TWO CAPSULES BY MOUTH EVERY MORNING AND TAKE TWO CAPSULES BY MOUTH EVERY EVENING (200 MG CP12)     Past Medical History:   Diagnosis Date     Adjustment disorder with depressed mood      Epileptic seizure (H)      HTN (hypertension)       Past Surgical History: " "  Procedure Laterality Date     REPLACE GENERATOR STIMULATOR VAGUS NERVE Left 2017    Procedure: REPLACE GENERATOR STIMULATOR VAGUS NERVE;  Vagus Nerve Stimulator Replacement  ;  Surgeon: Eliseo Nolen MD;  Location: [facility] OR     [facility]       Family History   Problem Relation Age of Onset     Hypertension Mother      Lipids Mother      Cardiovascular Maternal Uncle      Cardiovascular Maternal Uncle      Cardiovascular Paternal Grandfather      Cancer Maternal Grandmother         gastric     Asthma Sister       Psycho-Social History: Mili Mane currently lives with dad, she is her father's primary caregiver. Mom passed away . ,  She is currently not working.  Her sister is very supportive and so is her family members.  Currently, patient denies feeling depressed, denies feeling anhedonia, denies suicidal  thoughts, and denies having feelings of excessive guilt/worthlessness.  Does not smoke, rare alcohol use, no recreational drug use. We reviewed importance of mental and emotional wellbeing and impact on health.   Driving:  Currently patient is:  Not driving.  Previous Evaluations for Epilepsy:   EE:   SUMMARY OF 2 DAYS OF AMBULATORY EEG RECORDING:  This is an abnormal EEG due to the presence of:   1.  Intermittent bursts of mixed theta and delta slowing during this recording during wakefulness.   2.  Bilateral temporal independent epileptiform activities during this recording.  No clinical or electrographic seizures were observed during this recording.       MRI of Brain: None on file  Exam:    BP (!) 170/83   Pulse 81   Temp 98.6  F (37  C) (Skin)   Ht 5' 4.5\" (163.8 cm)   Wt 149 lb 12.8 oz (67.9 kg)   Breastfeeding No   BMI 25.32 kg/m       Wt Readings from Last 5 Encounters:   22 149 lb 12.8 oz (67.9 kg)   19 145 lb (65.8 kg)   18 143 lb 6.4 oz (65 kg)   17 137 lb (62.1 kg)   17 136 lb 0.4 oz (61.7 kg)       Alert, orientated, speech is fluent, " pupils are equal, round, and reactive to light, face symmetric, no pronator drip,hand tremor,  normal to light touch with no sensory deficits noted, finger to nose normal, no focal deficits noted.Gait is stable. Able to tandem gait.       Impression: Focal epilepsy    Discussion:     Ms. Brown is a 56-year-old right-handed female who presents with medically refractory epilepsy.  Video EEG evaluation in 2014 showed bitemporal lobe epileptiform discharges.  Her seizure semiology seems consistent with temporal lobe epilepsy with progression to bilateral tonic-clonic seizures.  She is currently on levetiracetam and Carbatrol.  We may further optimize seizure medications as tolerated.  On today's visit I reviewed with her it would be helpful to complete ambulatory EEG to evaluate seizure burden and in the future when she has time we should consider inpatient video EEG evaluation to characterize her staring spells.  I suspect her staring spells are focal impaired seizures.  She is also a candidate for epilepsy surgery and this should be considered on future visits.  Lastly she has recurrent episodes of brain fogginess which I suspect may be secondary to hyponatremia from the Carbatrol.  I have encouraged her to restrict fluids for now and we will check a sodium level with her seizure medication levels. she expressed understanding of these plan of care and is agreeable.    Plan :      Increase levetiracetam (500 mg tablet size) 1250 mg twice a day (2.5 tablets twice a day)     Continue Carbatrol 400 mg twice a day     optimed consult with Woo     Ambulatory EEG 48 hours to evaluate seizure burden (will have to come to clinic three days in row)    Reduce fluid intake (water/coffee/tea/soup) less 80oz per day, this will help normalize your Na+. Low Na+ causes foggy thinking     Check labs today levetiracetam, carbamazepine, liver, Na+, blood count     Sign up for mario eng, you can email us with concerns (you  "need a code for this)    Think about instapot freezer meal prep. It helps to cook two items in stainless steal container at one time.   [email or web address]      I spent 65 minutes with the patient. During this time medical history data collection, counseling, and coordination of care exceeded 50% of the visit time. I addressed all questions the patient/caregiver raised in regards to the patient's medical care. This note was created with voice recognition software. Inadvertent grammatical errors, typographical errors, and \"sound a like\" substitutions may occur due to limitations of the software.  Read the note carefully and apply context when erroneous substitutions have occurred. Thank you.     Guadalupe Hernandez MD   Epilepsy Staff           "

## 2022-01-26 NOTE — PATIENT INSTRUCTIONS
Increase levetiracetam (500 mg tablet size) 1250 mg twice a day (2.5 tablets twice a day)     Continue Carbatrol 400 mg twice a day     optimed consult with Woo to review your seizure medications     Ambulatory EEG 48 hours to evaluate seizure burden (will have to come to clinic three days in row)    Reduce fluid intake (water/coffee/tea/soup) less 80oz per day, this will help normalize your Na+. Low Na+ causes foggy thinking     Check labs today levetiracetam, carbamazepine, liver, Na+, blood count     Sign up for mario eng, you can email us with concerns (you need a code for this)    Think about instapot freezer meal prep. It helps to cook two items in stainless steal container at one time.   [email or web address]    Guadalupe Hernandez MD

## 2022-01-26 NOTE — LETTER
2022     RE: Mili Mane  : 1965   MRN: [id number]      Dear Colleague,    Thank you for referring your patient, Mili Mane, to the [facility] EPILEPSY CARE at [facility]. Please see a copy of my visit note below.    [facility]  Patient: Mili Mane  : 1965   Age: 56 year old  Today's Office Visit: 2022    VNS Management (VNS Flowsheet)  Vagus Nerve Stimulation 2022   MD garry castañeda   Implant Date 2017   Model Number [id number]   Serial Number [id number]   Patient ID ECA ECA   VNS Interrogation Incoming Parameters Outgoing Parameters   Date 2022   Output Current (mA) - -   Signal Frequency (Hz) - -   Pulse Width (Mu sec) - -   Signal Time On (sec) - -   Signal Time Off - -   Magnet Current (mA) - -   Magnet Time On (sec) - -   Magnet Pulse Width (Mu sec) - -   Number of Magnet Used - -   Output Current (mA) 1.25 1.25   Signal Frequency (Hz) 20 20   Pulse Width (usec) 250 250   Signal ON Time (sec) 30 60   Signal OFF Time (min) 5 5   Magent Output Current (mA) 1.5 1.5   Magent ON Time (sec) 60 60   Magnet Pulse Width (usec) 250 250   AutoStim Output Current (mA) 0 1.25   AutoStim Pulse Width (usec) 500 250   AutoStim ON Time (sec) 60 30   Tachycardia Detection ON/OFF on on   Heartbeat Detection Sensitivity (1-5) 2 2   Perform Verify Heartbeat Detection (y/n) Yes Yes   Threshold for AutoStim (%) 40 40   Output Current (OK/Low) ok -   Current Delivered (mA) 1.25 -   Impedance Value (Ohms) 2335 -   Battery Status Indicator (Green/Yellow/Red, %) green 50-75% -   IFI (No/Yes) No No   Number of Magnet Swipes 423 -   Average AutoStims per day 0 -          [facility] Epilepsy Care History and Physical       Patient:  Mili Mane  :  1965   Age:  56 year old   Today's Office Visit:  2022    Referring Provider:    Barney Kendall MD  [address] [place] [address]  "[address] [facility] [address]  [place] [address]    History of Present Illness:  Mili Mane is 56 year old right handed who presents with history epilepsy. Accompanied with sister Patricia. She followed with Dr. Kendall for 33 years. Her seizure started at age 18 months, early on she had \"petit mal\" which continued to age 11.  When she was born she had \" some oxygen issues at birth, then as infant she had recurrent fever\". Early childhood she was on phenobarbital, phenytoin, mysoline and she continued to have starting spells several time per week.  It seems over the years she has tried several seizure medications but continues to have high seizure burden.  Her recurrent spells seem consistent with focal impaired seizures.  Currently on antiepileptic drug she has fatigue, has brain fog, does have brain fog.   Seizure type 1: Aura of ringing in ear, stares, last 30 seconds, started 18 months. Per Patricia she has a \"blank stare for minutes\". No automatisms. She calls these \"small\" she describes them as \"I have loss of awareness, I do not get hurt, and I do not fall. She has 3-10 per month based on seizure. She describes another type of seizure as \"medium\" seizures in which she has 0-1 per year.   Seizure type 2: generalized tonic-clonic convulsion - whole body stiffening and shaking, she bites her cheek, may she had under 10 in her lifetime. Last \"hard seizure\" was 11/2021. In 11/2021 she was walking her dog, she fell and was confused.     Current antiepileptic drugs:   Levetiracetam 1000 mg twice a day   Carbatrol 400 mg twice a day     Epilepsy Risk Factors:  Patient has no history of encephalitis/meningitis, no history of stroke, no history of tumor, no history of traumatic brain injury.  The patient had a normal birth and delivery.  No developmental delays noted.  No febrile seizures.  No family members with epilepsy.     Precipitating factors:   Sleep Deprivation, Stress and Failure to take AED  Current Outpatient Medications " "  Medication Sig Dispense Refill     atenolol (TENORMIN) 25 MG tablet Take 25 mg by mouth daily       atenolol (TENORMIN) 50 MG tablet Take 50 mg by mouth daily       CARBATROL 200 MG 12 hr capsule TAKE TWO CAPSULES BY MOUTH EVERY MORNING AND TAKE TWO CAPSULES BY MOUTH EVERY EVENING 360 capsule 4     levETIRAcetam (KEPPRA) 500 MG tablet TAKE TWO TABLETS BY MOUTH TWICE A  tablet 11     LORazepam (ATIVAN) 1 MG tablet Take 1 tablet (1 mg) by mouth 2 times daily (Patient taking differently: Take 1 mg by mouth daily ) 60 tablet 5     medical cannabis (Patient's own supply) See Admin Instructions (The purpose of this order is to document that the patient reports taking medical cannabis.  This is not a prescription, and is not used to certify that the patient has a qualifying medical condition.)       Multiple Vitamins-Minerals (CENTRUM SILVER) per tablet Take 1 tablet by mouth daily       Perceived AED Side Effects: Yes  Medication Notes:   AED Medication Compliance:  compliant most of the time  Using a pill box:  Yes  Past AEDs:    Levetiracetam - started in 2012. She is not sure if it helps her seizures.   Carbatrol - started at age 1.   Celontin - \"thorat starting closing and had to go to hospital and placed on breathing\"  Phenytoin  - rash with phenytoin    Mysoline   Phenobarbital     AED - ANTIEPILEPTIC DRUGS 12/30/2021   levETIRAcetam (Oral) TAKE TWO TABLETS BY MOUTH TWICE A DAY (500 MG TABS)   Lacosamide (Oral) -   Carbatrol (Oral) TAKE TWO CAPSULES BY MOUTH EVERY MORNING AND TAKE TWO CAPSULES BY MOUTH EVERY EVENING (200 MG CP12)     Past Medical History:   Diagnosis Date     Adjustment disorder with depressed mood      Epileptic seizure (H)      HTN (hypertension)       Past Surgical History:   Procedure Laterality Date     REPLACE GENERATOR STIMULATOR VAGUS NERVE Left 6/20/2017    Procedure: REPLACE GENERATOR STIMULATOR VAGUS NERVE;  Vagus Nerve Stimulator Replacement  ;  Surgeon: Eliseo Nolen, " "MD;  Location:  OR     [facility]       Family History   Problem Relation Age of Onset     Hypertension Mother      Lipids Mother      Cardiovascular Maternal Uncle      Cardiovascular Maternal Uncle      Cardiovascular Paternal Grandfather      Cancer Maternal Grandmother         gastric     Asthma Sister       Psycho-Social History: Mili Mane currently lives with dad, she is her father's primary caregiver. Mom passed away . ,  She is currently not working.  Her sister is very supportive and so is her family members.  Currently, patient denies feeling depressed, denies feeling anhedonia, denies suicidal  thoughts, and denies having feelings of excessive guilt/worthlessness.  Does not smoke, rare alcohol use, no recreational drug use. We reviewed importance of mental and emotional wellbeing and impact on health.   Driving:  Currently patient is:  Not driving.  Previous Evaluations for Epilepsy:   EE:   SUMMARY OF 2 DAYS OF AMBULATORY EEG RECORDING:  This is an abnormal EEG due to the presence of:   1.  Intermittent bursts of mixed theta and delta slowing during this recording during wakefulness.   2.  Bilateral temporal independent epileptiform activities during this recording.  No clinical or electrographic seizures were observed during this recording.     MRI of Brain: None on file  Exam:    BP (!) 170/83   Pulse 81   Temp 98.6  F (37  C) (Skin)   Ht 5' 4.5\" (163.8 cm)   Wt 149 lb 12.8 oz (67.9 kg)   Breastfeeding No   BMI 25.32 kg/m       Wt Readings from Last 5 Encounters:   22 149 lb 12.8 oz (67.9 kg)   19 145 lb (65.8 kg)   18 143 lb 6.4 oz (65 kg)   17 137 lb (62.1 kg)   17 136 lb 0.4 oz (61.7 kg)     Alert, orientated, speech is fluent, pupils are equal, round, and reactive to light, face symmetric, no pronator drip,hand tremor,  normal to light touch with no sensory deficits noted, finger to nose normal, no focal deficits noted.Gait is stable. Able to " tandem gait.       Impression: Focal epilepsy    Discussion:     Ms. Brown is a 56-year-old right-handed female who presents with medically refractory epilepsy.  Video EEG evaluation in 2014 showed bitemporal lobe epileptiform discharges.  Her seizure semiology seems consistent with temporal lobe epilepsy with progression to bilateral tonic-clonic seizures.  She is currently on levetiracetam and Carbatrol.  We may further optimize seizure medications as tolerated.  On today's visit I reviewed with her it would be helpful to complete ambulatory EEG to evaluate seizure burden and in the future when she has time we should consider inpatient video EEG evaluation to characterize her staring spells.  I suspect her staring spells are focal impaired seizures.  She is also a candidate for epilepsy surgery and this should be considered on future visits.  Lastly she has recurrent episodes of brain fogginess which I suspect may be secondary to hyponatremia from the Carbatrol.  I have encouraged her to restrict fluids for now and we will check a sodium level with her seizure medication levels. she expressed understanding of these plan of care and is agreeable.    Plan :      Increase levetiracetam (500 mg tablet size) 1250 mg twice a day (2.5 tablets twice a day)     Continue Carbatrol 400 mg twice a day     optimed consult with Woo     Ambulatory EEG 48 hours to evaluate seizure burden (will have to come to clinic three days in row)    Reduce fluid intake (water/coffee/tea/soup) less 80oz per day, this will help normalize your Na+. Low Na+ causes foggy thinking     Check labs today levetiracetam, carbamazepine, liver, Na+, blood count     Sign up for mario eng, you can email us with concerns (you need a code for this)    Think about instapot freezer meal prep. It helps to cook two items in stainless steal container at one time.   [email or web address]    I spent 65 minutes with the patient.  "During this time medical history data collection, counseling, and coordination of care exceeded 50% of the visit time. I addressed all questions the patient/caregiver raised in regards to the patient's medical care. This note was created with voice recognition software. Inadvertent grammatical errors, typographical errors, and \"sound a like\" substitutions may occur due to limitations of the software.  Read the note carefully and apply context when erroneous substitutions have occurred.    Thank you.     Guadalupe Hernandez MD   Epilepsy Staff   "

## 2022-01-26 NOTE — PROGRESS NOTES
[facility] VNS Progress Note  Patient: Mili Mane  : 1965   Age: 56 year old  Today's Office Visit: 2022    VNS Management (VNS Flowsheet)  Vagus Nerve Stimulation 2022   MD garry castañeda   Implant Date 2017   Model Number 106 [id number]   Serial Number [id number]   Patient ID ECA ECA   VNS Interrogation Incoming Parameters Outgoing Parameters   Date 2022   Output Current (mA) - -   Signal Frequency (Hz) - -   Pulse Width (Mu sec) - -   Signal Time On (sec) - -   Signal Time Off - -   Magnet Current (mA) - -   Magnet Time On (sec) - -   Magnet Pulse Width (Mu sec) - -   Number of Magnet Used - -   Output Current (mA) 1.25 1.25   Signal Frequency (Hz) 20 20   Pulse Width (usec) 250 250   Signal ON Time (sec) 30 60   Signal OFF Time (min) 5 5   Magent Output Current (mA) 1.5 1.5   Magent ON Time (sec) 60 60   Magnet Pulse Width (usec) 250 250   AutoStim Output Current (mA) 0 1.25   AutoStim Pulse Width (usec) 500 250   AutoStim ON Time (sec) 60 30   Tachycardia Detection ON/OFF on on   Heartbeat Detection Sensitivity (1-5) 2 2   Perform Verify Heartbeat Detection (y/n) Yes Yes   Threshold for AutoStim (%) 40 40   Output Current (OK/Low) ok -   Current Delivered (mA) 1.25 -   Impedance Value (Ohms) 2335 -   Battery Status Indicator (Green/Yellow/Red, %) green 50-75% -   IFI (No/Yes) No No   Number of Magnet Swipes 423 -   Average AutoStims per day 0 -

## 2022-01-26 NOTE — Clinical Note
Marilia Kendall patients of 33 years, transferring care to us. Please touch base with her and update on clinic flow, establish mychart communication, and who to call for emergencies, etc.     Thanks. She is very nice.     Guadalupe

## 2022-01-26 NOTE — LETTER
Patient:  Mili Mane  :   1965  MRN:     [id number]    Ms.Elizabeth TONYA Mane  [address] [place] [address]    February 10, 2022    Dear ,    We are writing to inform you of your test results.    Your test results fall within the expected range(s) or remain unchanged from previous results.  Please continue with current treatment plan.    Resulted Orders   Carbamazepine and epoxide free and total   Result Value Ref Range    Carbamazepine Total Level 8.0 4.0 - 12.0 ug/mL    10, 11 Epoxide Level 2.2 0.4 - 4.0 ug/mL      Comment:      This test was developed and its performance characteristics  determined by [facility].  It has not been cleared or approved  by the Food and Drug Administration.    Free Carbamazepine Level Ug/Ml 1.4 0.6 - 4.2 ug/mL    Carb Epoxide, Unbound 0.9 0.2 - 2.0 ug/mL      Comment:      This test was developed and its performance characteristics  determined by [facility].  It has not been cleared or approved  by the Food and Drug Administration.    Narrative    Performed at:  [facility] [address]  [id number]  : [place] [facility], Phone:  [phone]   Sodium   Result Value Ref Range    Sodium 134 133 - 144 mmol/L   Keppra (Levetiracetam) Level   Result Value Ref Range    Levetiracetam 17.8 6.0 - 46.0 ug/mL      Comment:      Suggested Trough Concentrations: 6.0 - 46.0 ug/mL   AST   Result Value Ref Range    AST 17 0 - 45 U/L   ALT   Result Value Ref Range    ALT 23 0 - 50 U/L   CBC with platelets and differential   Result Value Ref Range    WBC Count 8.8 4.0 - 11.0 10e3/uL    RBC Count 4.34 3.80 - 5.20 10e6/uL    Hemoglobin 12.8 11.7 - 15.7 g/dL    Hematocrit 38.3 35.0 - 47.0 %    MCV 88 78 - 100 fL    MCH 29.5 26.5 - 33.0 pg    MCHC 33.4 31.5 - 36.5 g/dL    RDW 12.0 10.0 - 15.0 %    Platelet Count 402 150 - 450 10e3/uL    % Neutrophils 70 %    % Lymphocytes 20 %    % Monocytes 7 %    % Eosinophils 1 %    %  Basophils 1 %    % Immature Granulocytes 1 %    NRBCs per 100 WBC 0 <1 /100    Absolute Neutrophils 6.3 1.6 - 8.3 10e3/uL    Absolute Lymphocytes 1.8 0.8 - 5.3 10e3/uL    Absolute Monocytes 0.6 0.0 - 1.3 10e3/uL    Absolute Eosinophils 0.1 0.0 - 0.7 10e3/uL    Absolute Basophils 0.1 0.0 - 0.2 10e3/uL    Absolute Immature Granulocytes 0.0 <=0.4 10e3/uL    Absolute NRBCs 0.0 10e3/uL       Guadalupe Hernandez MD     [id number]  1965

## 2022-01-27 DIAGNOSIS — G40.209 PARTIAL SYMPTOMATIC EPILEPSY WITH COMPLEX PARTIAL SEIZURES, NOT INTRACTABLE, WITHOUT STATUS EPILEPTICUS (H): ICD-10-CM

## 2022-01-27 LAB — LEVETIRACETAM (KEPPRA): 17.8 UG/ML (ref 6–46)

## 2022-01-27 RX ORDER — LEVETIRACETAM 500 MG/1
TABLET ORAL
Qty: 450 TABLET | Refills: 3 | Status: SHIPPED | OUTPATIENT
Start: 2022-01-27 | End: 2023-01-02

## 2022-01-27 RX ORDER — CARBAMAZEPINE 200 MG/1
CAPSULE, EXTENDED RELEASE ORAL
Qty: 360 CAPSULE | Refills: 3 | Status: SHIPPED | OUTPATIENT
Start: 2022-01-27 | End: 2023-01-02

## 2022-01-27 NOTE — TELEPHONE ENCOUNTER
Send to [facility].  Patient no longer covered by [facility] RX Service.

## 2022-01-27 NOTE — TELEPHONE ENCOUNTER
We received the a letter from [facility].  They no longer administer her RX benefits.      Called patient, she is using [facility].      Please resend RX to this pharmacy.

## 2022-01-28 LAB
CARB EPOXIDE, UNBOUND: 0.9 UG/ML
CARBAMAZEPINE EP SERPL-MCNC: 2.2 UG/ML
CARBAMAZEPINE SERPL-MCNC: 8 UG/ML
CARBAMAZEPINE, UNBOUND: 1.4 UG/ML

## 2022-02-07 DIAGNOSIS — G40.109 LOCALIZATION-RELATED FOCAL EPILEPSY WITH SIMPLE PARTIAL SEIZURES (H): ICD-10-CM

## 2022-02-07 NOTE — TELEPHONE ENCOUNTER
"Chart reviewed. Recent script for lorazepam did not reach the pharmacy.   \"E-Prescribing Status: Transmission to pharmacy failed (1/26/2022  3:08 PM CST)\".    Routing to provider to re-sign.   "

## 2022-02-08 RX ORDER — LORAZEPAM 0.5 MG/1
TABLET ORAL
Qty: 60 TABLET | Refills: 5 | Status: SHIPPED | OUTPATIENT
Start: 2022-02-08 | End: 2022-06-30

## 2022-03-18 ENCOUNTER — TELEPHONE (OUTPATIENT)
Dept: NEUROLOGY | Facility: CLINIC | Age: 57
End: 2022-03-18
Payer: COMMERCIAL

## 2022-03-18 NOTE — TELEPHONE ENCOUNTER
Epilepsy Nurse Outreach Call:    Patient was contacted to welcome her to our practice and provide information on our service and clinic. I mailed her a copy of the Epilepsy Care Guidelines.     Brian English RN

## 2022-04-11 ENCOUNTER — VIRTUAL VISIT (OUTPATIENT)
Dept: NEUROLOGY | Facility: CLINIC | Age: 57
End: 2022-04-11
Attending: PSYCHIATRY & NEUROLOGY
Payer: COMMERCIAL

## 2022-04-11 DIAGNOSIS — G40.109 LOCALIZATION-RELATED FOCAL EPILEPSY WITH SIMPLE PARTIAL SEIZURES (H): Primary | ICD-10-CM

## 2022-04-11 NOTE — PROGRESS NOTES
Mili is a 56 year old who is being evaluated via a billable video visit.      How would you like to obtain your AVS? Mail a copy  If the video visit is dropped, the invitation should be resent by: Text to cell phone: Call [phone]  Will anyone else be joining your video visit? Yes: Patricia, . How would they like to receive their invitation? Send to e-mail at: [email or web address]      Video Start Time: 2:00 pm   Video-Visit Details    Type of service:  Video Visit    Video End Time:3:29 pm    Originating Location (pt. Location): Home    Distant Location (provider location):  [facility] EPILEPSY CARE     Platform used for Video Visit: Jb

## 2022-04-11 NOTE — LETTER
2022     RE: Mili Mane  : 1965   MRN: [id number]      Dear Colleague,    Thank you for referring your patient, Mili Mane, to the [facility] EPILEPSY CARE at [facility]. Please see a copy of my visit note below.    Mili is a 56 year old who is being evaluated via a billable video visit.      How would you like to obtain your AVS? Mail a copy  If the video visit is dropped, the invitation should be resent by: Text to cell phone: Call [phone]  Will anyone else be joining your video visit? Yes: Patricia, . How would they like to receive their invitation? Send to e-mail at: [email or web address]      Video Start Time: 2:00 pm   Video-Visit Details    Type of service:  Video Visit    Video End Time:3:29 pm    Originating Location (pt. Location): Home    Distant Location (provider location):  [facility] EPILEPSY CARE     Platform used for Video Visit: Weekend-a-gogo    Service Date: 2022     PHYSICIAN EPILEPSY PROGRAM CONSULTATION, MEDICATION THERAPY MANAGEMENT    This was a video visit from my home to the patient's home via [employer] due to COVID.  We used patient's email, [email or web address].  The patient's SMS is [phone].    Starting time was 2:00 p.m.  Ending time was 3:29 p.m.    DICTATION IDENTIFIER:  NAME:  Mili Mane  :     1965  DATE:   2022    IDENTIFYING INFORMATION AND INTRODUCTION:  Caty is a 56-year-old female seen on a video visit today for an epilepsy MTM consultation.  She lives in [place], [place], and she is a new adult to the Epilepsy MTM program.    Epileptologist is Dr. Guadalupe Hernandez and she will see her on 2022.    CHIEF COMPLAINTS:  Uncontrollable seizures and side effects like tiredness.      Reason for consultation:   a. Assessment of ASD options/ medication history.  b. Drug interaction assessment  c. Compliance assessment    Informants include the patient, her sister, Patricia  B., and medical records.    Time spent was 2-1/2 hours without the patient and 1 hour and 29 minutes with the patient.    MEDICATION INFORMATION:  1.  Current Anti-Seizure Medication:  a.  Levetiracetam 1250 mg b.i.d. for a total of 2500 mg per day.  Indication:  Seizures.  b.  Carbatrol/carbamazepine 400 mg b.i.d. for a total of 800 mg per day.  Indication:  Seizures.  c.  Lorazepam 1 mg.  The patient takes 1 q.h.s.    2.  Medical Cannabis:  a.  Cobalt oral suspension 1200 mg of CBD and 60 mg THC.  Patient takes 3 mL b.i.d.  b.  Tangerine oral suspension, which is  mg and traces of CBD 3 mL daily at noon.    3.  Rescue Drugs:  None.    4.  Current Anti-Seizure Lab Results:      5.  Concomitant Medications:  a.  Atenolol 50 mg q.a.m. and 25 mg at bedtime.    6.  Herbal Remedies:  a.  Multivitamin once a day.  b.  Medical cannabis - see above.     7.  Drug-Drug Interactions:  a.  Concurrent use of carbamazepine and levetiracetam may result in dizziness, ataxia or double vision, but we do not have really pharmacokinetic drug interactions here.    8.  Current Reported Side Effects:  Yes.  A light tremor and some mood issues.    ALLERGIES:  1.  Penicillin - rash.  2.  Methsuximide - anaphylaxis and rash and throat closed.  3.  Fish-containing products - causing hives.  4.  Bee pollen.  5.  Tree nuts - hives.    PAST MEDICAL HISTORY:  1.  The patient has focal seizure disorder.  2.  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  Battery replacement in 2010 and 2017, approximately every 7 years.  3.  Hyperglycemia.  4.  Hypo-osmolality and hyponatremia.  5.  Hypertension.  6.  Headaches.  7.  Psychotropic/psychological.  Patient has not seen a psychologist or psychiatrist yet.  Patient suffers from mood issues.    SEIZURE HISTORY:  1.  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  2.  Aura, ringing in her ear, blank stare.  These are the small ones.  She will have  3-10 per month.  3.  Aura, ringing in the ear, blank stare.  Medium ones.  She will have 0-1 per year.  4.  GTCs:  Patient had 10 lifetime ones.  Last hard seizure was 11/2021.  She walked the dog, fell and was confused.  5.  There have been no emergency room visits for epilepsy in the last 12 months.  6.  Zero hospitalizations for epilepsy in the past 12 months.  7.  Seizure onset event was a GTC convulsion at age 18 months.  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  8.  Treatment started at age 18 months.  9.  Status epilepticus history is negative.  10.  With regards to seizure reporting, the patient is reliable.  A seizure calendar is used.    PRECIPITATING FACTORS INCLUDE:   1.  Stress.  2.  Not enough sleep.  3.  Flashing lights.  4.  Hot weather.  5.  A 30% change of barometric temperature change.    SOCIAL AND ENVIRONMENTAL ASPECTS:  She lives in a townhouse with her dad.  The patient is not driving.    REVIEW OF SYSTEMS:  Last Pap smear in the 80s.    PHARMACOTHERAPY INDICATORS:  A.  Pharmaceutical Aspects  1.  Economic Assessment:  The patient has Medicare are and has prescription coverage with her insurance plan.  Prescription drug copayment is $3 up to $9.  The cannabis on the other hand is $100 to $445.  The cost of obtaining prescribed medications does not interfere with compliance.  2.  The patient's pharmacy is [facility].  The patient is happy with their services.  3.  Compliance Assessment:  The patient is independent in medication administration.  She does use a pillbox; it is a weekly one.  She fills it for 4 weeks.  She misses medication rarely.  She might be a little late.  Unusual level fluctuations do occur and the patient is capable of making medication adjustment.  Her sister, Patricia, is available to assist.    B.  Pharmacokinetic Aspects  1.  Habits and Chemical Use:  a.  Alcohol:  Never.  b.  Tobacco:  Never.  c.  Caffeine:  When she has a headache,  she will have 3 cans of Coke per week.  d.  Medical cannabis:  Cobalt oral suspension 2-3 times per day and [place] suspension at noon.    COMPREHENSIVE REVIEW AND ASSESSMENT OF ANTI-SEIZURE MEDICATION THERAPY:    1. Carbatrol/carbamazepine:  From 2005 to present.  Max dose 1800 mg per day with a CP max of 16.5 mg/L and carbamazepine epoxide 3 mg/L.  Please be aware the patient was in the 70s on Tegretol.  Carbamazepine was used in monotherapy in the combination with lacosamide, with topiramate, with levetiracetam, and one of the big issues is that sodium levels are dropping with Carbatrol as low as 126.  When carbamazepine was optimized, she also had more tremors.  Since 2018, she uses carbamazepine together with medical cannabis.    Assessment:  Continue with carbamazepine; it is patient's mainstay.  Be careful not to get into supratherapeutic ranges because then she has hyponatremia and that can cause more seizures.    2.  Depakote or valproic acid from 1979 until 1988.  She never took the sprinkles or ER formulation.  It was tolerated for 9 years and then the patient started experiencing nausea.  I do not have the past medical records, so I do not know what happened, which combination, what other drugs were added, why she got the nausea.  Assessment:  Because I do not know why she suddenly would get nausea with the medication, it could have been because of different medications added to the whole thing or patient has never tried extended-release formulation; so, therefore, in my opinion, maybe if we need something, Depakote could be retried, but because of weight gain, might not be a great choice.    3.  Dilantin/phenytoin patient had puffy gums in the 70s and she was on it until she got braces.    4.  Celontin/mesuximide:  When the patient was on it, had throat closed.  She got rash and received steroid treatment.    5.  Keppra:  The patient had 2 trials.  She was on it before in 2008 and was retried 01/2020  to present.  Max dose 2500 mg per day with a CP max of 18.8 mg/L.  The only side effects are some GI upset 20 years ago, but currently none,questionable mood changes.  Assessment:  Continue with Keppra as long as it does not cause too much side effects like depression, etc.    6.  Lamictal.  It appears maybe patient was on Lamictal; unclear, do not have data.    7.  Mysoline/primidone:  Possible rash in the 70s/early childhood.  Please be aware that phenobarbital caused her a rash, so mysoline gets converted in the body to phenobarbital, so she would have had a rash with it, too.    8.  Neurontin/gabapentin:  She was on it.  Had nausea.    9.  Phenobarbital:  Early childhood rash.    10.  Topamax:  It as in the combination with Carbatrol.  Patient had nausea.  No more data.    11.  Trileptal:  Questionable if the patient was on it, but if the patient has hyponatremia with Carbatrol, I would expect with oxcarbazepine, the hyponatremia being even worse.    12.  Vimpat/lacosamide:  Yes in 06/02/2010 until 01/2014.  Max dose 400 mg per day.  CP max 3.1/7.5 mg/L.  It was used in the combination with carbamazepine and the patient's EKG had abnormalities.    BENZODIAZEPINES:  a.  The patient is on lorazepam, currently at bedtime.  Continue with it.  b.  Clonazepam was used when the patient was on Depakote and she became very shaky.  Let us not forget that clonazepam has a much longer half-life than lorazepam.    RECOMMENDATION AND PLAN:    The following recommendations are for the physician providing epilepsy medical management:    1.  Continue with carbamazepine 800 mg per day and watch for sodium levels.  2.  Continue with cannabis, but it looks like cobalt is cutting seizures in half and the tangerine is not so helpful.  Let us see if some of the tiredness is a result of too much cannabis or not.  3.  Watch sodium levels.  They dropped in the past to 126 and 129, which can cause seizures on its own.  4.  Watch  carbamazepine levels.  They were high at 16.5 mg/L, supratherapeutic, and epoxide levels were around 3 mg/L, which is in the therapeutic range.  5.  Please have the patient check her blood pressure whenever she has migraines.      1. Felbamate trial.  Watch the interactions and be aware that patient has headaches, which could be exacerbated with felbamate.  Carbamazepine and felbamate would cause that carbamazepine levels would decrease and carbamazepine epoxide levels would increase.  On the other hand, felbamate concentration would decrease up to 40% compared to felbamate used in monotherapy.  2.  Another option would be Fycompa, Xcopri, Gabitril.  I am not sure if the patient was on lamotrigine before, but because the patient had EKG issues on Vimpat, I would suspect that we would have to do an EKG before we start lamotrigine in this patient, and I am not sure if she has been on it and I do not have data.    For all the retrial options, see above.    Thank you for the opportunity to participate in the care of this patient.      Marcella Berkowitz, Zeb  Pharmaceutical Care Coordinator    D: 2022   T: 2022   MT: al  Name:     KELY JAMES  MRN:      [id number]        Account:      [id number]   :      1965           Service Date: 2022   Document: [id number]

## 2022-04-18 ENCOUNTER — ANCILLARY PROCEDURE (OUTPATIENT)
Dept: NEUROLOGY | Facility: CLINIC | Age: 57
End: 2022-04-18
Attending: PSYCHIATRY & NEUROLOGY
Payer: COMMERCIAL

## 2022-04-18 DIAGNOSIS — G40.219 PARTIAL SYMPTOMATIC EPILEPSY WITH COMPLEX PARTIAL SEIZURES, INTRACTABLE, WITHOUT STATUS EPILEPTICUS (H): ICD-10-CM

## 2022-04-19 ENCOUNTER — ANCILLARY PROCEDURE (OUTPATIENT)
Dept: NEUROLOGY | Facility: CLINIC | Age: 57
End: 2022-04-19
Attending: PSYCHIATRY & NEUROLOGY
Payer: COMMERCIAL

## 2022-04-19 DIAGNOSIS — G40.219 PARTIAL SYMPTOMATIC EPILEPSY WITH COMPLEX PARTIAL SEIZURES, INTRACTABLE, WITHOUT STATUS EPILEPTICUS (H): ICD-10-CM

## 2022-04-20 ENCOUNTER — ANCILLARY PROCEDURE (OUTPATIENT)
Dept: NEUROLOGY | Facility: CLINIC | Age: 57
End: 2022-04-20
Attending: PSYCHIATRY & NEUROLOGY
Payer: COMMERCIAL

## 2022-04-20 DIAGNOSIS — G40.219 PARTIAL SYMPTOMATIC EPILEPSY WITH COMPLEX PARTIAL SEIZURES, INTRACTABLE, WITHOUT STATUS EPILEPTICUS (H): ICD-10-CM

## 2022-04-21 ENCOUNTER — ANCILLARY PROCEDURE (OUTPATIENT)
Dept: NEUROLOGY | Facility: CLINIC | Age: 57
End: 2022-04-21
Attending: PSYCHIATRY & NEUROLOGY
Payer: COMMERCIAL

## 2022-05-04 NOTE — PROGRESS NOTES
Service Date: 2022    M PHYSICIAN EPILEPSY PROGRAM CONSULTATION, MEDICATION THERAPY MANAGEMENT    This was a video visit from my home to the patient's home via [employer] due to COVID.  We used patient's email, [email or web address].  The patient's SMS is [phone].    Starting time was 2:00 p.m.  Ending time was 3:29 p.m.    DICTATION IDENTIFIER:  NAME:  Mili Mane  :     1965  DATE:   2022    IDENTIFYING INFORMATION AND INTRODUCTION:  Caty is a 56-year-old female seen on a video visit today for an epilepsy MTM consultation.  She lives in [place], [place], and she is a new adult to the Epilepsy MTM program.    Epileptologist is Dr. Guadalupe Hernandez and she will see her on 2022.    CHIEF COMPLAINTS:  Uncontrollable seizures and side effects like tiredness.      Reason for consultation:   a. Assessment of ASD options/ medication history.  b. Drug interaction assessment  c. Compliance assessment    Informants include the patient, her sister, Patricia GONZALEZ, and medical records.    Time spent was 2-1/2 hours without the patient and 1 hour and 29 minutes with the patient.    MEDICATION INFORMATION:  1.  Current Anti-Seizure Medication:  a.  Levetiracetam 1250 mg b.i.d. for a total of 2500 mg per day.  Indication:  Seizures.  b.  Carbatrol/carbamazepine 400 mg b.i.d. for a total of 800 mg per day.  Indication:  Seizures.  c.  Lorazepam 1 mg.  The patient takes 1 q.h.s.    2.  Medical Cannabis:  a.  Cobalt oral suspension 1200 mg of CBD and 60 mg THC.  Patient takes 3 mL b.i.d.  b.  Tangerine oral suspension, which is  mg and traces of CBD 3 mL daily at noon.    3.  Rescue Drugs:  None.    4.  Current Anti-Seizure Lab Results:      5.  Concomitant Medications:  a.  Atenolol 50 mg q.a.m. and 25 mg at bedtime.    6.  Herbal Remedies:  a.  Multivitamin once a day.  b.  Medical cannabis - see above.     7.  Drug-Drug Interactions:  a.  Concurrent use of carbamazepine and levetiracetam  may result in dizziness, ataxia or double vision, but we do not have really pharmacokinetic drug interactions here.    8.  Current Reported Side Effects:  Yes.  A light tremor and some mood issues.    ALLERGIES:  1.  Penicillin - rash.  2.  Methsuximide - anaphylaxis and rash and throat closed.  3.  Fish-containing products - causing hives.  4.  Bee pollen.  5.  Tree nuts - hives.    PAST MEDICAL HISTORY:  1.  The patient has focal seizure disorder.  2.  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  Battery replacement in 2010 and 2017, approximately every 7 years.  3.  Hyperglycemia.  4.  Hypo-osmolality and hyponatremia.  5.  Hypertension.  6.  Headaches.  7.  Psychotropic/psychological.  Patient has not seen a psychologist or psychiatrist yet.  Patient suffers from mood issues.    SEIZURE HISTORY:  1.  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  2.  Aura, ringing in her ear, blank stare.  These are the small ones.  She will have 3-10 per month.  3.  Aura, ringing in the ear, blank stare.  Medium ones.  She will have 0-1 per year.  4.  GTCs:  Patient had 10 lifetime ones.  Last hard seizure was 11/2021.  She walked the dog, fell and was confused.  5.  There have been no emergency room visits for epilepsy in the last 12 months.  6.  Zero hospitalizations for epilepsy in the past 12 months.  7.  Seizure onset event was a GTC convulsion at age 18 months.  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  8.  Treatment started at age 18 months.  9.  Status epilepticus history is negative.  10.  With regards to seizure reporting, the patient is reliable.  A seizure calendar is used.    PRECIPITATING FACTORS INCLUDE:   1.  Stress.  2.  Not enough sleep.  3.  Flashing lights.  4.  Hot weather.  5.  A 30% change of barometric temperature change.    SOCIAL AND ENVIRONMENTAL ASPECTS:  She lives in a townhouse with her dad.  The patient is not driving.    REVIEW OF  SYSTEMS:  Last Pap smear in the 80s.    PHARMACOTHERAPY INDICATORS:  A.  Pharmaceutical Aspects  1.  Economic Assessment:  The patient has Medicare [employer] and has prescription coverage with her insurance plan.  Prescription drug copayment is $3 up to $9.  The cannabis on the other hand is $100 to $445.  The cost of obtaining prescribed medications does not interfere with compliance.  2.  The patient's pharmacy is [facility].  The patient is happy with their services.  3.  Compliance Assessment:  The patient is independent in medication administration.  She does use a pillbox; it is a weekly one.  She fills it for 4 weeks.  She misses medication rarely.  She might be a little late.  Unusual level fluctuations do occur and the patient is capable of making medication adjustment.  Her sister, Patricia, is available to assist.    B.  Pharmacokinetic Aspects  1.  Habits and Chemical Use:  a.  Alcohol:  Never.  b.  Tobacco:  Never.  c.  Caffeine:  When she has a headache, she will have 3 cans of Coke per week.  d.  Medical cannabis:  Cobalt oral suspension 2-3 times per day and [place] suspension at noon.    COMPREHENSIVE REVIEW AND ASSESSMENT OF ANTI-SEIZURE MEDICATION THERAPY:    1. Carbatrol/carbamazepine:  From 2005 to present.  Max dose 1800 mg per day with a CP max of 16.5 mg/L and carbamazepine epoxide 3 mg/L.  Please be aware the patient was in the 70s on Tegretol.  Carbamazepine was used in monotherapy in the combination with lacosamide, with topiramate, with levetiracetam, and one of the big issues is that sodium levels are dropping with Carbatrol as low as 126.  When carbamazepine was optimized, she also had more tremors.  Since 2018, she uses carbamazepine together with medical cannabis.    Assessment:  Continue with carbamazepine; it is patient's mainstay.  Be careful not to get into supratherapeutic ranges because then she has hyponatremia and that can cause more seizures.    2.  Depakote or  valproic acid from 1979 until 1988.  She never took the sprinkles or ER formulation.  It was tolerated for 9 years and then the patient started experiencing nausea.  I do not have the past medical records, so I do not know what happened, which combination, what other drugs were added, why she got the nausea.  Assessment:  Because I do not know why she suddenly would get nausea with the medication, it could have been because of different medications added to the whole thing or patient has never tried extended-release formulation; so, therefore, in my opinion, maybe if we need something, Depakote could be retried, but because of weight gain, might not be a great choice.    3.  Dilantin/phenytoin patient had puffy gums in the 70s and she was on it until she got braces.    4.  Celontin/mesuximide:  When the patient was on it, had throat closed.  She got rash and received steroid treatment.    5.  Keppra:  The patient had 2 trials.  She was on it before in 2008 and was retried 01/2020 to present.  Max dose 2500 mg per day with a CP max of 18.8 mg/L.  The only side effects are some GI upset 20 years ago, but currently none,questionable mood changes.  Assessment:  Continue with Keppra as long as it does not cause too much side effects like depression, etc.    6.  Lamictal.  It appears maybe patient was on Lamictal; unclear, do not have data.    7.  Mysoline/primidone:  Possible rash in the 70s/early childhood.  Please be aware that phenobarbital caused her a rash, so mysoline gets converted in the body to phenobarbital, so she would have had a rash with it, too.    8.  Neurontin/gabapentin:  She was on it.  Had nausea.    9.  Phenobarbital:  Early childhood rash.    10.  Topamax:  It as in the combination with Carbatrol.  Patient had nausea.  No more data.    11.  Trileptal:  Questionable if the patient was on it, but if the patient has hyponatremia with Carbatrol, I would expect with oxcarbazepine, the hyponatremia being  even worse.    12.  Vimpat/lacosamide:  Yes in 06/02/2010 until 01/2014.  Max dose 400 mg per day.  CP max 3.1/7.5 mg/L.  It was used in the combination with carbamazepine and the patient's EKG had abnormalities.    BENZODIAZEPINES:  a.  The patient is on lorazepam, currently at bedtime.  Continue with it.  b.  Clonazepam was used when the patient was on Depakote and she became very shaky.  Let us not forget that clonazepam has a much longer half-life than lorazepam.    RECOMMENDATION AND PLAN:    The following recommendations are for the physician providing epilepsy medical management:    1.  Continue with carbamazepine 800 mg per day and watch for sodium levels.  2.  Continue with cannabis, but it looks like cobalt is cutting seizures in half and the tangerine is not so helpful.  Let us see if some of the tiredness is a result of too much cannabis or not.  3.  Watch sodium levels.  They dropped in the past to 126 and 129, which can cause seizures on its own.  4.  Watch carbamazepine levels.  They were high at 16.5 mg/L, supratherapeutic, and epoxide levels were around 3 mg/L, which is in the therapeutic range.  5.  Please have the patient check her blood pressure whenever she has migraines.      1. Felbamate trial.  Watch the interactions and be aware that patient has headaches, which could be exacerbated with felbamate.  Carbamazepine and felbamate would cause that carbamazepine levels would decrease and carbamazepine epoxide levels would increase.  On the other hand, felbamate concentration would decrease up to 40% compared to felbamate used in monotherapy.  2.  Another option would be Fycompa, Xcopri, Gabitril.  I am not sure if the patient was on lamotrigine before, but because the patient had EKG issues on Vimpat, I would suspect that we would have to do an EKG before we start lamotrigine in this patient, and I am not sure if she has been on it and I do not have data.    For all the retrial options, see  above.    Thank you for the opportunity to participate in the care of this patient.    Marcella Berkowitz, Zeb  Pharmaceutical Care Coordinator    Marcella Berkowitz, Zeb        D: 2022   T: 2022   MT: al    Name:     ERIKAKELY SHADY  MRN:      [id number]        Account:      [id number]   :      1965           Service Date: 2022       Document: [id number]

## 2022-05-06 ENCOUNTER — TELEPHONE (OUTPATIENT)
Dept: NEUROLOGY | Facility: CLINIC | Age: 57
End: 2022-05-06
Payer: COMMERCIAL

## 2022-05-06 NOTE — TELEPHONE ENCOUNTER
Prior Authorization Retail Medication Request    Medication/Dose: Carbatrol 200MG ER Capsules  ICD code (if different than what is on RX):    Previously Tried and Failed:    Rationale:      Insurance Name:    Insurance ID:        Pharmacy Information (if different than what is on RX)  Name:    Phone:

## 2022-05-11 NOTE — TELEPHONE ENCOUNTER
Central Prior Authorization Team   Phone: [phone]      PA Initiation    Medication: Carbatrol 200MG ER Capsules  Insurance Company: [employer] - Phone [phone] Fax [phone]  Pharmacy Filling the Rx: [facility] - [place], [place] - [address]  Filling Pharmacy Phone:    Filling Pharmacy Fax:    Start Date: 5/11/2022

## 2022-05-11 NOTE — TELEPHONE ENCOUNTER
Prior Authorization Approval    Authorization Effective Date: 4/11/2022  Authorization Expiration Date: 5/11/2023  Medication: Carbatrol 200MG ER Capsules  Approved Dose/Quantity: 120 per 30 days  Reference #: [id number]   Insurance Company: [employer] - Phone [phone] Fax [phone]  Expected CoPay:       Which Pharmacy is filling the prescription (Not needed for infusion/clinic administered): [facility] - [place], [place] - [address]  Pharmacy Notified: Yes  Patient Notified: No

## 2022-06-30 ENCOUNTER — OFFICE VISIT (OUTPATIENT)
Dept: NEUROLOGY | Facility: CLINIC | Age: 57
End: 2022-06-30
Payer: COMMERCIAL

## 2022-06-30 VITALS
HEART RATE: 72 BPM | SYSTOLIC BLOOD PRESSURE: 170 MMHG | DIASTOLIC BLOOD PRESSURE: 95 MMHG | TEMPERATURE: 98.1 F | HEIGHT: 64 IN | WEIGHT: 146.4 LBS | BODY MASS INDEX: 25 KG/M2

## 2022-06-30 DIAGNOSIS — G40.109 LOCALIZATION-RELATED FOCAL EPILEPSY WITH SIMPLE PARTIAL SEIZURES (H): ICD-10-CM

## 2022-06-30 LAB
LEVETIRACETAM SERPL-MCNC: 22.5 ΜG/ML (ref 10–40)
SODIUM SERPL-SCNC: 136 MMOL/L (ref 136–145)

## 2022-06-30 RX ORDER — LORAZEPAM 0.5 MG/1
TABLET ORAL
Qty: 90 TABLET | Refills: 1 | Status: SHIPPED | OUTPATIENT
Start: 2022-06-30 | End: 2022-12-23

## 2022-06-30 NOTE — PATIENT INSTRUCTIONS
"Continue same seizure for now    Mili and family would like to THINK about surgery. David did NOT order any surgery work up at this time.     Ask primary care provider about home safety evaluation (OT)    For the coming 6 months focus on emotional health (psychiatrist and talk therapist) and your move.     We reviewed goals of epilepsy surgery: seizure reduction. If seizure device is placed patient will have to follow up every 3 months.  Patient was frankly told that our goal is seizure reduction, not seizure freedom and I do not think patient will be able to drive after surgery. More so, in rare instances patients who undergo epilepsy surgery may have worse seizures, mood changes, surgrical risk (stroke/infection/bleeding/death).     Epilepsy Surgery Evaluation: This process takes 6-12 months. This is a journey!   Nurse education on epilepsy surgery   Pre surgical evaluation consist of Video EEG monitoring (5-10 days at [facility]). We need to determine where seizure are starting on the scalp Video EEG.   Evaluation with psychiatry for mental health care.  We will need psychiatric clearance prior to brain surgery.   Brain PET scan with ambulatory EEG - to localize seizure onset on PET scan  Neuro psychiatry test   WADA test (if on zonisamide or topiramate you need to stop these medications as instructed by MAREN prior to WADA) to understand your memory and brain language center   MRI brain     We take ALL the data above and \"put the puzzle together\" to determine where we think your seizures are starting and planning brain surgery.     Doctor Case Management Conference, consist of a multi-disciplinary medical team, to determine plan of care for epilepsy surgery. 6 epilepsy doctors, 2 nurses, and 3 neurosurgeon.    If we decide to proceed with invasive brain EEG monitoring, you will need to complete epilepsy surgery nurse education and visit with neurosurgeon   [facility] " "admission for \"internal OR inside\" brain EEG monitoring, this will require a 1-3 week hospitalization in the intensive care unit. At this time we will record multiple seizure (usually 6 seizures) to define seizure onset zone.  This is the seizure onset zone that needs to be treated with intervention.   11. A second Doctor Case Management Conference to determine plan of care for epilepsy surgery. Doctor Case Management Conference, consist of a multi-disciplinary medical team, to determine plan of care for epilepsy surgery. 6 epilepsy doctors, 2 nurses, and 3 neurosurgeon  12. Medical clearance and psychiatry clearance - have evaluation for depression/anxiety/or any psychosis. If you have untreated anxiety or depression, please have this treated per recommendations of psychiatrist and engage in appropriate therapy.   13. Epilepsy Surgery       Guadalupe Hernandez MD   "

## 2022-06-30 NOTE — LETTER
Patient:  Mili Mane  :   1965  MRN:     [id number]        Ms.Elizabeth TONYA Mane  [address]  [place] [address]        2022    Dear ,    We are writing to inform you of your test results.    Your test results fall within the expected range(s) or remain unchanged from previous results.  Please continue with current treatment plan.    Resulted Orders   Sodium   Result Value Ref Range    Sodium 136 136 - 145 mmol/L   Carbamazepine Epoxide and Total   Result Value Ref Range    10, 11 Epoxide Level 1.9 ug/mL      Comment:      INTERPRETIVE INFORMATION:  Carbamazepine-10, 11-Epoxide    Carbamazepine-10, 11 Epoxide   Therapeutic Range: Not well established    Toxic:  Greater than 15.0 ug/mL    Total Carbamazepine   Therapeutic Range: 4.0-12.0 ug/mL    Toxic: Greater than 15.0 ug/mL    The therapeutic range is based on serum pre-dose (trough)   draw at steady-state concentration. The carbamazepine   metabolite, Carbamazepine-10, 11-Epoxide, has   anticonvulsant activity and a proposed therapeutic range of   0.4-4 ug/mL.    A rare, adverse drug reaction to carbamazepine therapy   includes Banks-Everett syndrome or toxic epidermal   necrolysis. Patients of  ancestry with the presence of   the HLA-B*15:02 have an increased risk for this   carbamazepine-induced, life-threatening reaction.   Pharmacogenetic testing for HLA-B*15:02 is recommended   prior to treatment for patients at risk of carbamazepine   hypersensitivity. This information has been included in the   FDA-approved label for carbamazepine    (https://www.accessdata.fda.gov/scripts/cder/daf/index.cfm?e  vent=overview.process&jtaAtwbDp=724576) and in the   guideline from the Clinical Pharmacogenetics Implementation   Consortium (https://www.pharmgkb.org/guidelines).   [HLA-B*15:02 Genotyping, Carbamazepine Hypersensitivity,   [facility] test code 8063557.]    A combination of therapeutic drug monitoring with   HLA-B*15:02  pharmacogenetics genotyping may benefit   patients at increased risk of developing   carbamazepine-induced adverse events due to rare genotypes   other than the HLA-B*15:02 variant allele.    This test was developed and its performance characteristics   determined by [facility]. It has not been cleared or   approved by the US Food and Drug Administration. This test   was performed in a CLIA certified laboratory and is   intended for clinical purposes.    Carbamazepine Total Level 5.5 4.0 - 12.0 ug/mL      Comment:      Performed By: [facility]  [address] [place] [id number]  : Venkat Rod MD, PhD   Keppra (Levetiracetam) Level   Result Value Ref Range    Keppra (Levetiracetam) Level 22.5 10.0 - 40.0  g/mL       Guadalupe Hernandez MD           [id number]  1965

## 2022-06-30 NOTE — PROGRESS NOTES
"[facility] Epilepsy Care Progress Note    Patient:  Mili Mane  :  1965   Age:  57 year old   Today's Office Visit:  2022    Epilepsy History:  Her seizure started at age 18 months, early on she had \"petit mal\" which continued to age 11.  When she was born she had \" some oxygen issues at birth, then as infant she had recurrent fever\". Early childhood she was on phenobarbital, phenytoin, mysoline and she continued to have starting spells several time per week.  It seems over the years she has tried several seizure medications but continues to have high seizure burden.  Her recurrent spells seem consistent with focal impaired seizures.  Interval history: Mili Mane is 56 year old right handed who presents with history epilepsy. Accompanied with sister Patricia (sister) and Batsheva (cousin). She followed with Dr. Kendall for 33 years. Currently on antiepileptic drug she has fatigue, has brain fog, does have brain fog.   Seizure types:   Seizure type 1: Aura of ringing in ear, stares, last 30 seconds, started 18 months. Per Patricia she has a \"blank stare for minutes\". No automatisms. She calls these \"small\" she describes them as \"I have loss of awareness, I do not get hurt, and I do not fall. She has 3-10 per month based on seizure.  Past 4 months she had 5-10 seizure per month. She describes another type of seizure as \"medium\" seizures in which she has 0-1 per year.   Seizure type 2: generalized tonic-clonic convulsion - whole body stiffening and shaking, she bites her cheek, may she had under 10 in her lifetime. Last \"hard seizure\" was 2021. In 2021 she was walking her dog, she fell and was confused.   Current antiepileptic drugs:   Levetiracetam 1250 mg twice a day   Carbatrol 400 mg twice a day   Cobalt oral suspension 1200 mg of CBD and 60 mg THC.  Patient takes 3 mL b.i.d.  Tangerine oral suspension, which is  mg and traces of CBD 3 mL daily at noon.  Lorazepam 0.5 mg night (she was " "suppose to take it prn, but, she takes it every night)  Precipitating factors:   Sleep Deprivation, Stress and Failure to take AED  Current Outpatient Medications   Medication Sig Dispense Refill     atenolol (TENORMIN) 25 MG tablet Take 25 mg by mouth every evening       atenolol (TENORMIN) 50 MG tablet Take 50 mg by mouth every morning       CARBATROL 200 MG 12 hr capsule TAKE TWO CAPSULES BY MOUTH EVERY MORNING AND TAKE TWO CAPSULES BY MOUTH EVERY EVENING 360 capsule 3     levETIRAcetam (KEPPRA) 500 MG tablet TAKE TWO TABLETS  AND ONE-HALF TABLET (1250 MG)  BY MOUTH TWICE A  tablet 3     LORazepam (ATIVAN) 0.5 MG tablet 1-2 tablet per night as needed for seizures 60 tablet 5     medical cannabis (Patient's own supply) See Admin Instructions (The purpose of this order is to document that the patient reports taking medical cannabis.  This is not a prescription, and is not used to certify that the patient has a qualifying medical condition.)       Multiple Vitamins-Minerals (CENTRUM SILVER) per tablet Take 1 tablet by mouth daily       Perceived AED Side Effects: Yes  Medication Notes:   AED Medication Compliance:  compliant most of the time  Using a pill box:  Yes  Past AEDs:    Levetiracetam - started in 2012. She is not sure if it helps her seizures.   Carbatrol - started at age 1.   Celontin - \"thorat starting closing and had to go to hospital and placed on breathing\"  Phenytoin  - rash with phenytoin    Mysoline   Phenobarbital   Felbamate - did not work per her memory.   Psycho-Social History: Mili Mane currently lives with dad, she is her father's primary caregiver. Mom passed away 2017. ,  She is currently not working.  Her sister is very supportive and so is her family members.She lives with dad, he is 93, he is \"hard on her and stresses her out\".   Currently, patient denies feeling depressed, denies feeling anhedonia, denies suicidal  thoughts, and denies having feelings of excessive " "guilt/worthlessness.  Does not smoke, rare alcohol use, no recreational drug use. We reviewed importance of mental and emotional wellbeing and impact on health.   Driving:  Currently patient is:  Not driving.  Previous Evaluations for Epilepsy:   EE:   SUMMARY OF 2 DAYS OF AMBULATORY EEG RECORDING:  This is an abnormal EEG due to the presence of:   1.  Intermittent bursts of mixed theta and delta slowing during this recording during wakefulness.   2.  Bilateral temporal independent epileptiform activities during this recording.  No clinical or electrographic seizures were observed during this recording.       MRI of Brain: None on file  Exam:    BP (!) 170/95   Pulse 72   Temp 98.1  F (36.7  C) (Temporal)   Ht 5' 4\" (162.6 cm)   Wt 146 lb 6.4 oz (66.4 kg)   BMI 25.13 kg/m       Wt Readings from Last 5 Encounters:   22 146 lb 6.4 oz (66.4 kg)   22 149 lb 12.8 oz (67.9 kg)   19 145 lb (65.8 kg)   18 143 lb 6.4 oz (65 kg)   17 137 lb (62.1 kg)       Alert, orientated, speech is fluent, pupils are equal, round, and reactive to light, face symmetric, no pronator drip,hand tremor,  normal to light touch with no sensory deficits noted, finger to nose normal, no focal deficits noted.Gait is stable. Able to tandem gait.       Impression: Focal epilepsy    Discussion:     Ms. Brown is a 56-year-old right-handed female who presents with medically refractory epilepsy.  Video EEG evaluation in  showed bitemporal lobe epileptiform discharges.  Her seizure semiology seems consistent with temporal lobe epilepsy with progression to bilateral tonic-clonic seizures.  She is currently on levetiracetam and Carbatrol.  We may further optimize seizure medications as tolerated.  On today's visit I reviewed with her it would be helpful to complete ambulatory EEG to evaluate seizure burden and in the future when she has time we should consider inpatient video EEG evaluation to characterize " her staring spells.  I suspect her staring spells are focal impaired seizures.  She is also a candidate for epilepsy surgery and this should be considered on future visits.  Lastly she has recurrent episodes of brain fogginess which I suspect may be secondary to hyponatremia from the Carbatrol.  I have encouraged her to restrict fluids for now and we will check a sodium level with her seizure medication levels. she expressed understanding of these plan of care and is agreeable.    Plan :      Continue same seizure for now  Levetiracetam 1250 mg twice a day   Carbatrol 400 mg twice a day   Cobalt oral suspension 1200 mg of CBD and 60 mg THC.  Patient takes 3 mL b.i.d.  Tangerine oral suspension, which is  mg and traces of CBD 3 mL daily at noon.  Lorazepam 0.5 mg night (she was suppose to take it prn, but, she takes it every night)    Mili and family would like to THINK about surgery. David did NOT order any surgery work up at this time.     Ask primary care provider about home safety evaluation (OT)    For the coming 6 months focus on emotional health (psychiatrist and talk therapist) and your move.     We reviewed goals of epilepsy surgery: seizure reduction. If seizure device is placed patient will have to follow up every 3 months.  Patient was frankly told that our goal is seizure reduction, not seizure freedom and I do not think patient will be able to drive after surgery. More so, in rare instances patients who undergo epilepsy surgery may have worse seizures, mood changes, surgrical risk (stroke/infection/bleeding/death).       I spent 67 minutes in total today to provide comprehensive  medical care.   I spent 5 minutes writing the note and placing orders.   I spent 2 minutes  reviewing the chart.     The rest of the time was spent with the patient in face to face interview. During this time key medical decisions were made with review of medical chart prior to visit, visit with patient,  counseling/education, and post visit work, including documentation of note on the day of visit. I addressed all questions the patient/caregiver raised in regards to epilepsy or related medical questions.       Guadalupe Hernandez MD   Epilepsy Staff

## 2022-06-30 NOTE — LETTER
Date:July 2, 2022      Provider requested that no letter be sent. Do not send.       [facility]

## 2022-06-30 NOTE — LETTER
"2022       RE: Mili Mane  : 1965   MRN: [id number]      Dear Colleague,    Thank you for referring your patient, Mili Mane, to the [facility] EPILEPSY CARE at [facility]. Please see a copy of my visit note below.    [facility] Epilepsy Care Progress Note    Patient:  Mili Mane  :  1965   Age:  57 year old   Today's Office Visit:  2022    Epilepsy History:  Her seizure started at age 18 months, early on she had \"petit mal\" which continued to age 11.  When she was born she had \" some oxygen issues at birth, then as infant she had recurrent fever\". Early childhood she was on phenobarbital, phenytoin, mysoline and she continued to have starting spells several time per week.  It seems over the years she has tried several seizure medications but continues to have high seizure burden.  Her recurrent spells seem consistent with focal impaired seizures.  Interval history: Mili Mane is 56 year old right handed who presents with history epilepsy. Accompanied with sister Patricia (sister) and Batsheva (cousin). She followed with Dr. Kendall for 33 years. Currently on antiepileptic drug she has fatigue, has brain fog, does have brain fog.   Seizure types:   Seizure type 1: Aura of ringing in ear, stares, last 30 seconds, started 18 months. Per Patricia she has a \"blank stare for minutes\". No automatisms. She calls these \"small\" she describes them as \"I have loss of awareness, I do not get hurt, and I do not fall. She has 3-10 per month based on seizure.  Past 4 months she had 5-10 seizure per month. She describes another type of seizure as \"medium\" seizures in which she has 0-1 per year.   Seizure type 2: generalized tonic-clonic convulsion - whole body stiffening and shaking, she bites her cheek, may she had under 10 in her lifetime. Last \"hard seizure\" was 2021. In 2021 she was walking her dog, she fell and was confused. " "  Current antiepileptic drugs:   Levetiracetam 1250 mg twice a day   Carbatrol 400 mg twice a day   Cobalt oral suspension 1200 mg of CBD and 60 mg THC.  Patient takes 3 mL b.i.d.  Tangerine oral suspension, which is  mg and traces of CBD 3 mL daily at noon.  Lorazepam 0.5 mg night (she was suppose to take it prn, but, she takes it every night)  Precipitating factors:   Sleep Deprivation, Stress and Failure to take AED  Current Outpatient Medications   Medication Sig Dispense Refill     atenolol (TENORMIN) 25 MG tablet Take 25 mg by mouth every evening       atenolol (TENORMIN) 50 MG tablet Take 50 mg by mouth every morning       CARBATROL 200 MG 12 hr capsule TAKE TWO CAPSULES BY MOUTH EVERY MORNING AND TAKE TWO CAPSULES BY MOUTH EVERY EVENING 360 capsule 3     levETIRAcetam (KEPPRA) 500 MG tablet TAKE TWO TABLETS  AND ONE-HALF TABLET (1250 MG)  BY MOUTH TWICE A  tablet 3     LORazepam (ATIVAN) 0.5 MG tablet 1-2 tablet per night as needed for seizures 60 tablet 5     medical cannabis (Patient's own supply) See Admin Instructions (The purpose of this order is to document that the patient reports taking medical cannabis.  This is not a prescription, and is not used to certify that the patient has a qualifying medical condition.)       Multiple Vitamins-Minerals (CENTRUM SILVER) per tablet Take 1 tablet by mouth daily       Perceived AED Side Effects: Yes  Medication Notes:   AED Medication Compliance:  compliant most of the time  Using a pill box:  Yes  Past AEDs:    Levetiracetam - started in 2012. She is not sure if it helps her seizures.   Carbatrol - started at age 1.   Celontin - \"thorat starting closing and had to go to hospital and placed on breathing\"  Phenytoin  - rash with phenytoin    Mysoline   Phenobarbital   Felbamate - did not work per her memory.   Psycho-Social History: Mili Mane currently lives with dad, she is her father's primary caregiver. Mom passed away 2017. ,  She is " "currently not working.  Her sister is very supportive and so is her family members.She lives with dad, he is 93, he is \"hard on her and stresses her out\".   Currently, patient denies feeling depressed, denies feeling anhedonia, denies suicidal  thoughts, and denies having feelings of excessive guilt/worthlessness.  Does not smoke, rare alcohol use, no recreational drug use. We reviewed importance of mental and emotional wellbeing and impact on health.   Driving:  Currently patient is:  Not driving.  Previous Evaluations for Epilepsy:   EE:   SUMMARY OF 2 DAYS OF AMBULATORY EEG RECORDING:  This is an abnormal EEG due to the presence of:   1.  Intermittent bursts of mixed theta and delta slowing during this recording during wakefulness.   2.  Bilateral temporal independent epileptiform activities during this recording.  No clinical or electrographic seizures were observed during this recording.       MRI of Brain: None on file  Exam:    BP (!) 170/95   Pulse 72   Temp 98.1  F (36.7  C) (Temporal)   Ht 5' 4\" (162.6 cm)   Wt 146 lb 6.4 oz (66.4 kg)   BMI 25.13 kg/m       Wt Readings from Last 5 Encounters:   22 146 lb 6.4 oz (66.4 kg)   22 149 lb 12.8 oz (67.9 kg)   19 145 lb (65.8 kg)   18 143 lb 6.4 oz (65 kg)   17 137 lb (62.1 kg)       Alert, orientated, speech is fluent, pupils are equal, round, and reactive to light, face symmetric, no pronator drip,hand tremor,  normal to light touch with no sensory deficits noted, finger to nose normal, no focal deficits noted.Gait is stable. Able to tandem gait.       Impression: Focal epilepsy    Discussion:     Ms. Brown is a 56-year-old right-handed female who presents with medically refractory epilepsy.  Video EEG evaluation in  showed bitemporal lobe epileptiform discharges.  Her seizure semiology seems consistent with temporal lobe epilepsy with progression to bilateral tonic-clonic seizures.  She is currently on " levetiracetam and Carbatrol.  We may further optimize seizure medications as tolerated.  On today's visit I reviewed with her it would be helpful to complete ambulatory EEG to evaluate seizure burden and in the future when she has time we should consider inpatient video EEG evaluation to characterize her staring spells.  I suspect her staring spells are focal impaired seizures.  She is also a candidate for epilepsy surgery and this should be considered on future visits.  Lastly she has recurrent episodes of brain fogginess which I suspect may be secondary to hyponatremia from the Carbatrol.  I have encouraged her to restrict fluids for now and we will check a sodium level with her seizure medication levels. she expressed understanding of these plan of care and is agreeable.    Plan :      Continue same seizure for now  Levetiracetam 1250 mg twice a day   Carbatrol 400 mg twice a day   Cobalt oral suspension 1200 mg of CBD and 60 mg THC.  Patient takes 3 mL b.i.d.  Tangerine oral suspension, which is  mg and traces of CBD 3 mL daily at noon.  Lorazepam 0.5 mg night (she was suppose to take it prn, but, she takes it every night)    Mili and family would like to THINK about surgery. David did NOT order any surgery work up at this time.     Ask primary care provider about home safety evaluation (OT)    For the coming 6 months focus on emotional health (psychiatrist and talk therapist) and your move.     We reviewed goals of epilepsy surgery: seizure reduction. If seizure device is placed patient will have to follow up every 3 months.  Patient was frankly told that our goal is seizure reduction, not seizure freedom and I do not think patient will be able to drive after surgery. More so, in rare instances patients who undergo epilepsy surgery may have worse seizures, mood changes, surgrical risk (stroke/infection/bleeding/death).       I spent 67 minutes in total today to provide comprehensive  medical care.    I spent 5 minutes writing the note and placing orders.   I spent 2 minutes  reviewing the chart.     The rest of the time was spent with the patient in face to face interview. During this time key medical decisions were made with review of medical chart prior to visit, visit with patient, counseling/education, and post visit work, including documentation of note on the day of visit. I addressed all questions the patient/caregiver raised in regards to epilepsy or related medical questions.       Guadalupe Hernandez MD   Epilepsy Staff           VNS was interrogated today. Settings are as follows:       06/30/22 1300   Device Information   MD SIP   Implant Date 06/20/17   Model Number 106   Serial Number [id number]   Patient ID ECA   Parameters   VNS Interrogation Incoming Parameters   Date 06/30/22   Output Current (mA) 1.25   Signal Frequency (Hz) 20   Pulse Width (Mu sec) 250   Signal Time On (sec) 30   Signal Time Off 5   Other Parameters   Magent Output Current (mA) 1.5   Magent ON Time (sec) 60   Magnet Pulse Width (usec) 250   AutoStim Output Current (mA) 1.25   AutoStim Pulse Width (usec) 250   AutoStim ON Time (sec) 30   Tachycardia Detection ON/OFF on   Heartbeat Detection Sensitivity (1-5) 2   Perform Verify Heartbeat Detection (y/n) No   Threshold for AutoStim (%) 40   Diagnostics   Output Current (OK/Low) ok   Current Delivered (mA) 1.25   Impedance Value (Ohms) 2203   Battery Status Indicator (Green/Yellow/Red, %) 50-75%   IFI (No/Yes) No   Other   Number of Magnet Swipes 455   Average AutoStims per day 42.68       Assessment: Sufficient battery life is remaining. Patient denies concern with device.     Brian English RN      Again, thank you for allowing me to participate in the care of your patient.      Sincerely,    Guadalupe Hernandez MD

## 2022-06-30 NOTE — PROGRESS NOTES
VNS was interrogated today. Settings are as follows:       06/30/22 1300   Device Information   MD SIP   Implant Date 06/20/17   Model Number 106   Serial Number [id number]   Patient ID ECA   Parameters   VNS Interrogation Incoming Parameters   Date 06/30/22   Output Current (mA) 1.25   Signal Frequency (Hz) 20   Pulse Width (Mu sec) 250   Signal Time On (sec) 30   Signal Time Off 5   Other Parameters   Magent Output Current (mA) 1.5   Magent ON Time (sec) 60   Magnet Pulse Width (usec) 250   AutoStim Output Current (mA) 1.25   AutoStim Pulse Width (usec) 250   AutoStim ON Time (sec) 30   Tachycardia Detection ON/OFF on   Heartbeat Detection Sensitivity (1-5) 2   Perform Verify Heartbeat Detection (y/n) No   Threshold for AutoStim (%) 40   Diagnostics   Output Current (OK/Low) ok   Current Delivered (mA) 1.25   Impedance Value (Ohms) 2203   Battery Status Indicator (Green/Yellow/Red, %) 50-75%   IFI (No/Yes) No   Other   Number of Magnet Swipes 455   Average AutoStims per day 42.68       Assessment: Sufficient battery life is remaining. Patient denies concern with device.     Brian English RN

## 2022-07-05 LAB
CARBAMAZEPINE EP SERPL-MCNC: 1.9 UG/ML
CARBAMAZEPINE SERPL-MCNC: 5.5 UG/ML

## 2022-11-17 ENCOUNTER — TELEPHONE (OUTPATIENT)
Dept: NEUROLOGY | Facility: CLINIC | Age: 57
End: 2022-11-17

## 2022-11-17 NOTE — TELEPHONE ENCOUNTER
What is the concern that needs to be addressed by a nurse? Patient called to notify Dr. Hernandez. She started Counseling with Dr. Britt Madrigal ph [phone]. Patient also needs renewal for [place]    May a detailed message be left on voicemail? Yes    Date of last office visit: 6/30/22    Message routed to: [facility] RN Pool

## 2022-11-21 NOTE — TELEPHONE ENCOUNTER
"Per   \"Guadalupe Hernandez MD Hamley, David, RN  Caller: Unspecified (4 days ago,  1:20 PM)  I have not certified her before, she is not on my list. We would have to schedule a visit for this and I have to complete new form for her. Guadalupe   \"    Call placed to patient.  Voice message left requesting a call back.  "

## 2022-12-20 ENCOUNTER — TELEPHONE (OUTPATIENT)
Dept: NEUROLOGY | Facility: CLINIC | Age: 57
End: 2022-12-20

## 2022-12-20 NOTE — TELEPHONE ENCOUNTER
Call placed to patient  In the past  did renewal of medical cannabis program and patient needs it renewing before February.  I explained that  would need to see the patient to discuss this and go over a few things before she could consider renewing the certification  Patient is okay with this    Patient has a new email address and asked me if I knew how to get this changed in the program. I recommended she either go online to her account, or to call the Medical Cannabis program directly    Patient wants  to know that she has been seeing a counselor.    No additional questions or comments.  Patient was transferred to scheduling to get an appointment sent up

## 2022-12-20 NOTE — TELEPHONE ENCOUNTER
What is the concern that needs to be addressed by a nurse? Patient would like to discuss renewing her Medical Cannibis    May a detailed message be left on voicemail? Yes    Date of last office visit: 6/30/22    Message routed to: [facility] RN Pool

## 2022-12-23 DIAGNOSIS — G40.109 LOCALIZATION-RELATED FOCAL EPILEPSY WITH SIMPLE PARTIAL SEIZURES (H): ICD-10-CM

## 2022-12-23 RX ORDER — LORAZEPAM 0.5 MG/1
TABLET ORAL
Qty: 90 TABLET | Refills: 1 | Status: SHIPPED | OUTPATIENT
Start: 2022-12-23 | End: 2023-01-02

## 2022-12-23 NOTE — TELEPHONE ENCOUNTER
LORazepam (ATIVAN) 0.5 MG tablet 90 tablet 1 6/30/2022         Last Written Prescription Date:  6-  Last Fill Quantity: 90,   # refills: 1  Last Office Visit : 6-  Future Office visit:  1-2-2023    Routing refill request to provider for review/approval because:  CONTROLLED MEDICATION      Kathleen M Doege RN

## 2023-01-02 ENCOUNTER — OFFICE VISIT (OUTPATIENT)
Dept: NEUROLOGY | Facility: CLINIC | Age: 58
End: 2023-01-02
Payer: COMMERCIAL

## 2023-01-02 VITALS
WEIGHT: 146 LBS | HEART RATE: 76 BPM | TEMPERATURE: 98.4 F | SYSTOLIC BLOOD PRESSURE: 172 MMHG | BODY MASS INDEX: 24.92 KG/M2 | HEIGHT: 64 IN | DIASTOLIC BLOOD PRESSURE: 82 MMHG

## 2023-01-02 DIAGNOSIS — G40.109 LOCALIZATION-RELATED FOCAL EPILEPSY WITH SIMPLE PARTIAL SEIZURES (H): Primary | ICD-10-CM

## 2023-01-02 DIAGNOSIS — G40.209 PARTIAL SYMPTOMATIC EPILEPSY WITH COMPLEX PARTIAL SEIZURES, NOT INTRACTABLE, WITHOUT STATUS EPILEPTICUS (H): ICD-10-CM

## 2023-01-02 RX ORDER — CARBAMAZEPINE 200 MG/1
CAPSULE, EXTENDED RELEASE ORAL
Qty: 360 CAPSULE | Refills: 3 | Status: SHIPPED | OUTPATIENT
Start: 2023-01-02 | End: 2023-07-31

## 2023-01-02 RX ORDER — LORAZEPAM 0.5 MG/1
TABLET ORAL
Qty: 90 TABLET | Refills: 1 | Status: SHIPPED | OUTPATIENT
Start: 2023-01-02 | End: 2023-09-25

## 2023-01-02 RX ORDER — LEVETIRACETAM 500 MG/1
TABLET ORAL
Qty: 450 TABLET | Refills: 3 | Status: SHIPPED | OUTPATIENT
Start: 2023-01-02 | End: 2023-04-04

## 2023-01-02 ASSESSMENT — PATIENT HEALTH QUESTIONNAIRE - PHQ9: SUM OF ALL RESPONSES TO PHQ QUESTIONS 1-9: 16

## 2023-01-02 NOTE — LETTER
"2023       RE: Mili Mane  : 1965   MRN: [id number]      Dear Colleague,    Thank you for referring your patient, Mili Mane, to the [facility] EPILEPSY CARE at [facility]. Please see a copy of my visit note below.    [facility] Epilepsy Care Progress Note    Patient:  Mili Mane  :  1965   Age:  57 year old   Today's Office Visit:  2023      Epilepsy History:  Her seizure started at age 18 months, early on she had \"petit mal\" which continued to age 11.  When she was born she had \" some oxygen issues at birth, then as infant she had recurrent fever\". Early childhood she was on phenobarbital, phenytoin, mysoline and she continued to have starting spells several time per week.  It seems over the years she has tried several seizure medications but continues to have high seizure burden.  Her recurrent spells seem consistent with focal impaired seizures.  Interval history: Mili Mane is 56 year old right handed who presents with history epilepsy. Accompanied with sister Patricia (sister) and Batsheva (cousin). She followed with Dr. Kendall for 33 years. Currently on antiepileptic drug she has fatigue, has brain fog, does have brain fog.  We spent the entire visit discussing importance of epilepsy surgery and better seizure control to mitigate cognitive decline over time associated with memory impairment and repetitive seizures.  Patient expressed understanding of these recommendations and would like to proceed with further epilepsy surgical evaluation.  Seizure types:   Seizure type 1: focal seizures with impairment in awareness  - Aura of ringing in ear, stares, last 30 seconds, started 18 months. Per Patricia she has a \"blank stare for minutes\". No automatisms. She calls these \"small\" she describes them as \"I have loss of awareness, I do not get hurt, and I do not fall. She may have 5-10 seizure per month.  She describes another " "type of seizure as \"medium\" seizures in which she has 0-1 per year.   Seizure type 2: generalized tonic-clonic convulsion - whole body stiffening and shaking, she bites her cheek, may she had under 10 in her lifetime. Last \"hard seizure\" was 11/2021. In 11/2021 she was walking her dog, she fell and was confused.   Current antiepileptic drugs:   Levetiracetam 1250 mg twice a day   Carbatrol 400 mg twice a day   Cobalt oral suspension 1200 mg of CBD and 60 mg THC.  Patient takes 3 mL b.i.d.  Tangerine oral suspension, which is  mg and traces of CBD 3 mL daily at noon.  Lorazepam 0.5 mg night (she was suppose to take it prn, but, she takes it every night)  Precipitating factors:   Sleep Deprivation, Stress and Failure to take AED  Current Outpatient Medications   Medication Sig Dispense Refill     atenolol (TENORMIN) 25 MG tablet Take 25 mg by mouth every evening       atenolol (TENORMIN) 50 MG tablet Take 50 mg by mouth every morning       CARBATROL 200 MG 12 hr capsule TAKE TWO CAPSULES BY MOUTH EVERY MORNING AND TAKE TWO CAPSULES BY MOUTH EVERY EVENING 360 capsule 3     levETIRAcetam (KEPPRA) 500 MG tablet TAKE TWO TABLETS  AND ONE-HALF TABLET (1250 MG)  BY MOUTH TWICE A  tablet 3     LORazepam (ATIVAN) 0.5 MG tablet TAKE ONE TABLET BY MOUTH EVERY EVENING 90 tablet 1     medical cannabis (Patient's own supply) See Admin Instructions (The purpose of this order is to document that the patient reports taking medical cannabis.  This is not a prescription, and is not used to certify that the patient has a qualifying medical condition.)       Multiple Vitamins-Minerals (CENTRUM SILVER) per tablet Take 1 tablet by mouth daily       Perceived AED Side Effects: Yes  Medication Notes:   AED Medication Compliance:  compliant most of the time  Using a pill box:  Yes  Past AEDs:    Levetiracetam - started in 2012. She is not sure if it helps her seizures.   Carbatrol - started at age 1.   Celontin - \"thorat " "starting closing and had to go to hospital and placed on breathing\"  Phenytoin  - rash with phenytoin    Mysoline   Phenobarbital   Felbamate - did not work per her memory.   Psycho-Social History: Mili Mane currently lives with dad, she is her father's primary caregiver. Mom passed away . ,  She is currently not working.  Her sister is very supportive and so is her family members.She lives with dad, he is 93, he is \"hard on her and stresses her out\".   Currently, patient denies feeling depressed, denies feeling anhedonia, denies suicidal  thoughts, and denies having feelings of excessive guilt/worthlessness.  Does not smoke, rare alcohol use, no recreational drug use. We reviewed importance of mental and emotional wellbeing and impact on health.   Driving:  Currently patient is:  Not driving.  Previous Evaluations for Epilepsy:   EE:   SUMMARY OF 2 DAYS OF AMBULATORY EEG RECORDING:  This is an abnormal EEG due to the presence of:   1.  Intermittent bursts of mixed theta and delta slowing during this recording during wakefulness.   2.  Bilateral temporal independent epileptiform activities during this recording.  No clinical or electrographic seizures were observed during this recording.       MRI of Brain: None on file  Exam:    BP (!) 172/82   Pulse 76   Temp 98.4  F (36.9  C) (Temporal)   Ht 5' 4\" (162.6 cm)   Wt 146 lb (66.2 kg)   BMI 25.06 kg/m       Wt Readings from Last 5 Encounters:   23 146 lb (66.2 kg)   22 146 lb 6.4 oz (66.4 kg)   22 149 lb 12.8 oz (67.9 kg)   19 145 lb (65.8 kg)   18 143 lb 6.4 oz (65 kg)       Alert, orientated, speech is fluent, pupils are equal, round, and reactive to light, face symmetric, no pronator drip,hand tremor,  normal to light touch with no sensory deficits noted, finger to nose normal, no focal deficits noted.Gait is stable. Able to tandem gait.       Impression: Focal epilepsy    Discussion:     Ms. Brown is a " 57-year-old right-handed female who presents with medically refractory epilepsy.  Video EEG evaluation in 2014 showed bitemporal lobe epileptiform discharges.  Her seizure semiology seems consistent with temporal lobe epilepsy with progression to bilateral tonic-clonic seizures.  She is currently on levetiracetam and Carbatrol.  We may further optimize seizure medications as tolerated.  On today's visit we reviewed the importance of better seizure control management and the role of epilepsy surgery, specifically neuromodulation.  She expressed understanding of these recommendations and would like to proceed with epilepsy surgery.  Patient was clearly told that she will continue to have seizures most likely, however reducing her seizure burden will reduce risk of bodily injury and memory decline over time.  I suspect she may have bitemporal lobe epilepsy and may need deep brain stimulator device or RNS device.  She already has a vagus nerve stimulator.      Plan :      Continue same seizure for now  Levetiracetam 1250 mg twice a day   Carbatrol 400 mg twice a day   Cobalt oral suspension 1200 mg of CBD and 60 mg THC.  Patient takes 3 mL b.i.d.  Tangerine oral suspension, which is  mg and traces of CBD 3 mL daily at noon.  Lorazepam 0.5 mg night (she was suppose to take it prn, but, she takes it every night)      Certified for cannabis today     Epilepsy Surgery Test:   EMU Video EEG for presurgical (ordered)  MRI brain (she has vagus nerve stimulator) ordered  PET scan and Video EEG ordered   Neuropsychology needed for epilepsy surgery   Mental Health - she has established care with therapist     We reviewed goals of epilepsy surgery: seizure reduction. If seizure device is placed patient will have to follow up every 3 months.  Patient was frankly told that our goal is seizure reduction, not seizure freedom and I do not think patient will be able to drive after surgery. More so, in rare instances patients who  undergo epilepsy surgery may have worse seizures, mood changes, surgrical risk (stroke/infection/bleeding/death).       I spent 41 minutes in total today to provide comprehensive  medical care.   I spent 5 minutes writing the note and placing orders.   I spent 2 minutes  reviewing the chart.     The rest of the time was spent with the patient in face to face interview. During this time key medical decisions were made with review of medical chart prior to visit, visit with patient, counseling/education, and post visit work, including documentation of note on the day of visit. I addressed all questions the patient/caregiver raised in regards to epilepsy or related medical questions.       Guadalupe Hernandez MD   Epilepsy Staff           Again, thank you for allowing me to participate in the care of your patient.      Sincerely,    Guadalupe Hernandez MD

## 2023-01-02 NOTE — LETTER
Date:January 3, 2023      Patient was self referred, no letter generated. Do not send.        [facility] Health Information

## 2023-01-02 NOTE — PATIENT INSTRUCTIONS
"Continue same seizure for now  Levetiracetam 1250 mg twice a day   Carbatrol 400 mg twice a day   Cobalt oral suspension 1200 mg of CBD and 60 mg THC.  Patient takes 3 mL b.i.d.  Tangerine oral suspension, which is  mg and traces of CBD 3 mL daily at noon.  Lorazepam 0.5 mg night (she was suppose to take it prn, but, she takes it every night)      Certified for cannabis     Sign up for my chart and you can see all your records and notes   Consider smart watch and safety measures so you can go to outside activities like [employer] classes      Epilepsy Surgery Evaluation: Workup and evaluation may take 6-12 months. This is a journey!     1. First hospital admission is scalp EEG electrodes to localize seizure onset. Pre surgical evaluation consist of Video EEG monitoring (5-10 days at [facility]). We need to determine where seizure are starting on the scalp Video EEG.     2. You will have to complete several outpatient test which include the following so we may better understand where your seizure start and how your brain works.  MRI of brain epilepsy protocol   PET of brain scan with video EEG    Neuro psychiatry test (this is several hours long and your cognitive function will be tested)   MAYBE WADA test       3. Nurse education to review hospitalization, surgery process, and answer questions about the process    4. We will take all of the data that you have completed and presented to a group of epilepsy doctors, neurosurgeons, and neuropsychologist to determine the best plan of care individualized to you. Doctor Case Management Conference to determine plan of care for epilepsy surgery     5. If we decide to proceed with epilepsy surgery, you may need invasive brain EEG monitoring, patient will need epilepsy surgery nurse education and visit with Epilepsy Neurosurgeon.       6. Schedule hospital admission for \"internal OR inside\" brain EEG monitoring, this will require a 1-3 week " "hospitalization in the intensive care unit. At this time we will record multiple seizure (usually 6 seizures) to define seizure onset zone.  This is the seizure onset zone that needs to be treated with intervention.    7. Second Doctor Case Management Conference to determine plan of care for epilepsy surgery. We take ALL the data above and \"put the puzzle together\" to determine where we think your seizures are starting and planning brain surgery.     8. Medical clearance and psychiatry clearance: Please complete evaluation for depression/anxiety/or any psychosis. If you have untreated anxiety or depression, please have this treated per recommendations of psychiatrist and engage in appropriate therapy.          You may review [facility], [facility],   to learn about a patient's experience with Responsive Neurostimulation (Neuropace). [email or web address] and neuropace.com.       How to Contact [facility] Epilepsy Care:   Send my chart message on EPIC to Dr. Hernandez OR Call [facility] [phone] to speak with staff for assistance     If you need paper work completed please contact our office and allow 2-3 weeks for turnaround time, if you do not hear from us, please send [facility] message again.     Guadalupe Hernandez MD     "

## 2023-01-02 NOTE — PROGRESS NOTES
"[facility] [place] [facility] [place]    Patient:  Mili Mane  :  1965   Age:  57 year old   Today's Office Visit:  2023      Epilepsy History:  Her seizure started at age 18 months, early on she had \"petit mal\" which continued to age 11.  When she was born she had \" some oxygen issues at birth, then as infant she had recurrent fever\". Early childhood she was on phenobarbital, phenytoin, mysoline and she continued to have starting spells several time per week.  It seems over the years she has tried several seizure medications but continues to have high seizure burden.  Her recurrent spells seem consistent with focal impaired seizures.  Interval history: Mili Mane is 56 year old right handed who presents with history epilepsy. Accompanied with sister Patricia (sister) and Batsheva (cousin). She followed with Dr. Kendall for 33 years. Currently on antiepileptic drug she has fatigue, has brain fog, does have brain fog.  We spent the entire visit discussing importance of epilepsy surgery and better seizure control to mitigate cognitive decline over time associated with memory impairment and repetitive seizures.  Patient expressed understanding of these recommendations and would like to proceed with further epilepsy surgical evaluation.  Seizure types:   Seizure type 1: focal seizures with impairment in awareness  - Aura of ringing in ear, stares, last 30 seconds, started 18 months. Per Patricia she has a \"blank stare for minutes\". No automatisms. She calls these \"small\" she describes them as \"I have loss of awareness, I do not get hurt, and I do not fall. She may have 5-10 seizure per month.  She describes another type of seizure as \"medium\" seizures in which she has 0-1 per year.   Seizure type 2: generalized tonic-clonic convulsion - whole body stiffening and shaking, she bites her cheek, may she had under 10 in her lifetime. Last \"hard seizure\" was 2021. In 2021 she was walking her dog, she " "fell and was confused.   Current antiepileptic drugs:   Levetiracetam 1250 mg twice a day   Carbatrol 400 mg twice a day   Cobalt oral suspension 1200 mg of CBD and 60 mg THC.  Patient takes 3 mL b.i.d.  Tangerine oral suspension, which is  mg and traces of CBD 3 mL daily at noon.  Lorazepam 0.5 mg night (she was suppose to take it prn, but, she takes it every night)  Precipitating factors:   Sleep Deprivation, Stress and Failure to take AED  Current Outpatient Medications   Medication Sig Dispense Refill     atenolol (TENORMIN) 25 MG tablet Take 25 mg by mouth every evening       atenolol (TENORMIN) 50 MG tablet Take 50 mg by mouth every morning       CARBATROL 200 MG 12 hr capsule TAKE TWO CAPSULES BY MOUTH EVERY MORNING AND TAKE TWO CAPSULES BY MOUTH EVERY EVENING 360 capsule 3     levETIRAcetam (KEPPRA) 500 MG tablet TAKE TWO TABLETS  AND ONE-HALF TABLET (1250 MG)  BY MOUTH TWICE A  tablet 3     LORazepam (ATIVAN) 0.5 MG tablet TAKE ONE TABLET BY MOUTH EVERY EVENING 90 tablet 1     medical cannabis (Patient's own supply) See Admin Instructions (The purpose of this order is to document that the patient reports taking medical cannabis.  This is not a prescription, and is not used to certify that the patient has a qualifying medical condition.)       Multiple Vitamins-Minerals (CENTRUM SILVER) per tablet Take 1 tablet by mouth daily       Perceived AED Side Effects: Yes  Medication Notes:   AED Medication Compliance:  compliant most of the time  Using a pill box:  Yes  Past AEDs:    Levetiracetam - started in 2012. She is not sure if it helps her seizures.   Carbatrol - started at age 1.   Celontin - \"thorat starting closing and had to go to hospital and placed on breathing\"  Phenytoin  - rash with phenytoin    Mysoline   Phenobarbital   Felbamate - did not work per her memory.   Psycho-Social History: Mili Mane currently lives with dad, she is her father's primary caregiver. Mom passed " "away 2017. ,  She is currently not working.  Her sister is very supportive and so is her family members.She lives with dad, he is 93, he is \"hard on her and stresses her out\".   Currently, patient denies feeling depressed, denies feeling anhedonia, denies suicidal  thoughts, and denies having feelings of excessive guilt/worthlessness.  Does not smoke, rare alcohol use, no recreational drug use. We reviewed importance of mental and emotional wellbeing and impact on health.   Driving:  Currently patient is:  Not driving.  Previous Evaluations for Epilepsy:   EE:   SUMMARY OF 2 DAYS OF AMBULATORY EEG RECORDING:  This is an abnormal EEG due to the presence of:   1.  Intermittent bursts of mixed theta and delta slowing during this recording during wakefulness.   2.  Bilateral temporal independent epileptiform activities during this recording.  No clinical or electrographic seizures were observed during this recording.       MRI of Brain: None on file  Exam:    BP (!) 172/82   Pulse 76   Temp 98.4  F (36.9  C) (Temporal)   Ht 5' 4\" (162.6 cm)   Wt 146 lb (66.2 kg)   BMI 25.06 kg/m       Wt Readings from Last 5 Encounters:   23 146 lb (66.2 kg)   22 146 lb 6.4 oz (66.4 kg)   22 149 lb 12.8 oz (67.9 kg)   19 145 lb (65.8 kg)   18 143 lb 6.4 oz (65 kg)       Alert, orientated, speech is fluent, pupils are equal, round, and reactive to light, face symmetric, no pronator drip,hand tremor,  normal to light touch with no sensory deficits noted, finger to nose normal, no focal deficits noted.Gait is stable. Able to tandem gait.       Impression: Focal epilepsy    Discussion:     Ms. Brown is a 57-year-old right-handed female who presents with medically refractory epilepsy.  Video EEG evaluation in  showed bitemporal lobe epileptiform discharges.  Her seizure semiology seems consistent with temporal lobe epilepsy with progression to bilateral tonic-clonic seizures.  She is " currently on levetiracetam and Carbatrol.  We may further optimize seizure medications as tolerated.  On today's visit we reviewed the importance of better seizure control management and the role of epilepsy surgery, specifically neuromodulation.  She expressed understanding of these recommendations and would like to proceed with epilepsy surgery.  Patient was clearly told that she will continue to have seizures most likely, however reducing her seizure burden will reduce risk of bodily injury and memory decline over time.  I suspect she may have bitemporal lobe epilepsy and may need deep brain stimulator device or RNS device.  She already has a vagus nerve stimulator.      Plan :      Continue same seizure for now  Levetiracetam 1250 mg twice a day   Carbatrol 400 mg twice a day   Cobalt oral suspension 1200 mg of CBD and 60 mg THC.  Patient takes 3 mL b.i.d.  Tangerine oral suspension, which is  mg and traces of CBD 3 mL daily at noon.  Lorazepam 0.5 mg night (she was suppose to take it prn, but, she takes it every night)      Certified for cannabis today     Epilepsy Surgery Test:   EMU Video EEG for presurgical (ordered)  MRI brain (she has vagus nerve stimulator) ordered  PET scan and Video EEG ordered   Neuropsychology needed for epilepsy surgery   Mental Health - she has established care with therapist     We reviewed goals of epilepsy surgery: seizure reduction. If seizure device is placed patient will have to follow up every 3 months.  Patient was frankly told that our goal is seizure reduction, not seizure freedom and I do not think patient will be able to drive after surgery. More so, in rare instances patients who undergo epilepsy surgery may have worse seizures, mood changes, surgrical risk (stroke/infection/bleeding/death).       I spent 41 minutes in total today to provide comprehensive  medical care.   I spent 5 minutes writing the note and placing orders.   I spent 2 minutes  reviewing the  chart.     The rest of the time was spent with the patient in face to face interview. During this time key medical decisions were made with review of medical chart prior to visit, visit with patient, counseling/education, and post visit work, including documentation of note on the day of visit. I addressed all questions the patient/caregiver raised in regards to epilepsy or related medical questions.       Guadalupe Hernandez MD   Epilepsy Staff

## 2023-02-06 ENCOUNTER — HOSPITAL ENCOUNTER (OUTPATIENT)
Facility: CLINIC | Age: 58
End: 2023-02-06
Admitting: PSYCHIATRY & NEUROLOGY
Payer: COMMERCIAL

## 2023-04-04 ENCOUNTER — VIRTUAL VISIT (OUTPATIENT)
Dept: NEUROLOGY | Facility: CLINIC | Age: 58
End: 2023-04-04
Payer: COMMERCIAL

## 2023-04-04 VITALS — BODY MASS INDEX: 23.92 KG/M2 | HEIGHT: 62 IN | WEIGHT: 130 LBS

## 2023-04-04 DIAGNOSIS — G40.209 PARTIAL SYMPTOMATIC EPILEPSY WITH COMPLEX PARTIAL SEIZURES, NOT INTRACTABLE, WITHOUT STATUS EPILEPTICUS (H): ICD-10-CM

## 2023-04-04 RX ORDER — LEVETIRACETAM 500 MG/1
TABLET ORAL
Qty: 540 TABLET | Refills: 3 | Status: SHIPPED | OUTPATIENT
Start: 2023-04-04 | End: 2023-11-21

## 2023-04-04 ASSESSMENT — PATIENT HEALTH QUESTIONNAIRE - PHQ9
SUM OF ALL RESPONSES TO PHQ QUESTIONS 1-9: 9
10. IF YOU CHECKED OFF ANY PROBLEMS, HOW DIFFICULT HAVE THESE PROBLEMS MADE IT FOR YOU TO DO YOUR WORK, TAKE CARE OF THINGS AT HOME, OR GET ALONG WITH OTHER PEOPLE: SOMEWHAT DIFFICULT
SUM OF ALL RESPONSES TO PHQ QUESTIONS 1-9: 9

## 2023-04-04 NOTE — PROGRESS NOTES
"[facility] Epilepsy [facility] Progress Note    Patient:  Mili Mane  :  1965   Age:  57 year old   Today's Office Visit:  2023      Epilepsy History:  Her seizure started at age 18 months, early on she had \"petit mal\" which continued to age 11.  When she was born she had \" some oxygen issues at birth, then as infant she had recurrent fever\". Early childhood she was on phenobarbital, phenytoin, mysoline and she continued to have starting spells several time per week.  It seems over the years she has tried several seizure medications but continues to have high seizure burden.  Her recurrent spells seem consistent with focal impaired seizures.She followed with Dr. Kendall for 33 years.  Interval history: She changed visit to telephone call. Mili Mane is 57 year old right handed who presents with history epilepsy. Her niece Talya is with her. is not accompanied with sister Patricia (sister) and Batsheva (cousin). She has 7 seizure per month. 2023 she had 4 seizure. She is taking care of her ill father and is stressed. She broke her left shoulder 2 months ago and she has to go to physical therapy. Currently on antiepileptic drug she has fatigue, has brain fog, does have brain fog.  We spent the entire visit discussing importance of epilepsy surgery and better seizure control to mitigate cognitive decline over time associated with memory impairment and repetitive seizures.  Patient expressed understanding of these recommendations and would like to proceed with further epilepsy surgical evaluation.  Seizure types:   Seizure type 1: focal seizures with impairment in awareness  - Aura of ringing in ear, stares, last 30 seconds, started 18 months. Per Patricia she has a \"blank stare for minutes\". No automatisms. She calls these \"small\" she describes them as \"I have loss of awareness, I do not get hurt, and I do not fall. She may have 5-10 seizure per month.  She describes another type of seizure as \"medium\" " "seizures in which she has 0-1 per year.   Seizure type 2: generalized tonic-clonic convulsion - whole body stiffening and shaking, she bites her cheek, may she had under 10 in her lifetime. Last \"hard seizure\" was 11/2021. In 11/2021 she was walking her dog, she fell and was confused.   Current antiepileptic drugs:   Levetiracetam 1250 mg twice a day   Carbatrol 400 mg twice a day   Cobalt oral suspension 1200 mg of CBD and 60 mg THC.  Patient takes 3 mL b.i.d.  Tangerine oral suspension, which is  mg and traces of CBD 3 mL daily at noon.  Lorazepam 0.5 mg night (she was suppose to take it prn, but, she takes it every night)  Precipitating factors:   Sleep Deprivation, Stress and Failure to take AED  Current Outpatient Medications   Medication Sig Dispense Refill     atenolol (TENORMIN) 25 MG tablet Take 25 mg by mouth every evening       atenolol (TENORMIN) 50 MG tablet Take 50 mg by mouth every morning       CARBATROL 200 MG 12 hr capsule TAKE TWO CAPSULES BY MOUTH EVERY MORNING AND TAKE TWO CAPSULES BY MOUTH EVERY EVENING 360 capsule 3     levETIRAcetam (KEPPRA) 500 MG tablet TAKE TWO TABLETS  AND ONE-HALF TABLET (1250 MG)  BY MOUTH TWICE A  tablet 3     LORazepam (ATIVAN) 0.5 MG tablet TAKE ONE TABLET BY MOUTH EVERY EVENING 90 tablet 1     medical cannabis (Patient's own supply) See Admin Instructions (The purpose of this order is to document that the patient reports taking medical cannabis.  This is not a prescription, and is not used to certify that the patient has a qualifying medical condition.)       Multiple Vitamins-Minerals (CENTRUM SILVER) per tablet Take 1 tablet by mouth daily       Perceived AED Side Effects: Yes  Medication Notes:   AED Medication Compliance:  compliant most of the time  Using a pill box:  Yes  Past AEDs:    Levetiracetam - started in 2012. She is not sure if it helps her seizures.   Carbatrol - started at age 1.   Celontin - \"thorat starting closing and had to go to " "hospital and placed on breathing\"  Phenytoin  - rash with phenytoin    Mysoline   Phenobarbital   Felbamate - did not work per her memory.   Psycho-Social History: Mili Mane currently lives with dad, she is her father's primary caregiver. Mom passed away . ,  She is currently not working.  Her sister is very supportive and so is her family members.She lives with dad, he is 93, he is \"hard on her and stresses her out\".   Currently, patient denies feeling depressed, denies feeling anhedonia, denies suicidal  thoughts, and denies having feelings of excessive guilt/worthlessness.  Does not smoke, rare alcohol use, no recreational drug use. We reviewed importance of mental and emotional wellbeing and impact on health.   Driving:  Currently patient is:  Not driving.  Previous Evaluations for Epilepsy:   EE:   SUMMARY OF 2 DAYS OF AMBULATORY EEG RECORDING:  This is an abnormal EEG due to the presence of:   1.  Intermittent bursts of mixed theta and delta slowing during this recording during wakefulness.   2.  Bilateral temporal independent epileptiform activities during this recording.  No clinical or electrographic seizures were observed during this recording.       MRI of Brain: None on file  Exam:    Ht 5' 2\" (157.5 cm)   Wt 130 lb (59 kg)   BMI 23.78 kg/m       Wt Readings from Last 5 Encounters:   23 130 lb (59 kg)   23 146 lb (66.2 kg)   22 146 lb 6.4 oz (66.4 kg)   22 149 lb 12.8 oz (67.9 kg)   19 145 lb (65.8 kg)       Alert, orientated, speech is fluent    Impression: Focal epilepsy    Discussion: Ms. Brown is a 57-year-old right-handed female who presents with medically refractory epilepsy.  Video EEG evaluation in  showed bitemporal lobe epileptiform discharges.  Her seizure semiology seems consistent with temporal lobe epilepsy with progression to bilateral tonic-clonic seizures.  She is currently on levetiracetam and Carbatrol.  We will increase " levetiracetam 1500 mg twice a day. She has left shoulder fractured and is working with physical therapy. We will revisit Epilepsy Surgery Test later in summer 2023 after she heals from left shoulder injury:     On today's visit we reviewed the importance of better seizure control management and the role of epilepsy surgery, specifically neuromodulation.  She expressed understanding of these recommendations and would like to proceed with epilepsy surgery.  Patient was clearly told that she will continue to have seizures most likely, however reducing her seizure burden will reduce risk of bodily injury and memory decline over time.  I suspect she may have bitemporal lobe epilepsy and may need deep brain stimulator device or RNS device.  She already has a vagus nerve stimulator.      Plan :        Increase Levetiracetam 1500 mg twice a day   Continue Carbatrol 400 mg twice a day   Continue Cobalt oral suspension 1200 mg of CBD and 60 mg THC.  Patient takes 3 mL b.i.d.  Continue Tangerine oral suspension, which is  mg and traces of CBD 3 mL daily at noon.  Lorazepam 0.5 mg night (she was suppose to take it prn, but, she takes it every night)  Certified for cannabis     We will revisit Epilepsy Surgery Test later in summer 2023 after she heals from left shoulder injury:   EMU Video EEG for presurgical   MRI brain (she has vagus nerve stimulator) ordered  PET scan and Video EEG ordered   Neuropsychology needed for epilepsy surgery   Mental Health - she has established care with therapist. Continue working with talk therapist for self care    We reviewed goals of epilepsy surgery: seizure reduction. If seizure device is placed patient will have to follow up every 3 months.  Patient was frankly told that our goal is seizure reduction, not seizure freedom and I do not think patient will be able to drive after surgery. More so, in rare instances patients who undergo epilepsy surgery may have worse seizures, mood  changes, surgrical risk (stroke/infection/bleeding/death).       I spent 41 minutes in total today to provide comprehensive  medical care.   I spent 5 minutes writing the note and placing orders.   I spent 2 minutes  reviewing the chart.     The rest of the time was spent with the patient in face to face interview. During this time key medical decisions were made with review of medical chart prior to visit, visit with patient, counseling/education, and post visit work, including documentation of note on the day of visit. I addressed all questions the patient/caregiver raised in regards to epilepsy or related medical questions.       Guadalupe Hernandez MD   Epilepsy Staff           Virtual Visit Details    Type of service:  Telephone Visit   Phone call duration: 21 minutes     Depression Response    Patient completed the PHQ-9 assessment for depression and scored >9? No  Question 9 on the PHQ-9 was positive for suicidality? No  Does patient have current mental health provider? Yes    Is this a virtual visit? Yes   Does patient have suicidal ideation (positive question 9)? No - offer to place Mental Health Referral.  Patient declined referral/not needed    I personally notified the following: visit provider    PATIENT HEALTH QUESTIONNAIRE-9 (PHQ - 9)    Over the last 2 weeks, how often have you been bothered by any of the following problems?    1. Little interest or pleasure in doing things -  Several days   2. Feeling down, depressed, or hopeless -  More than half the days   3. Trouble falling or staying asleep, or sleeping too much - More than half the days   4. Feeling tired or having little energy -  More than half the days   5. Poor appetite or overeating -  Several days   6. Feeling bad about yourself - or that you are a failure or have let yourself or your family down -  Several days   7. Trouble concentrating on things, such as reading the newspaper or watching television - Not at all   8. Moving or speaking so  slowly that other people could have noticed? Or the opposite - being so fidgety or restless that you have been moving around a lot more than usual Not at all   9. Thoughts that you would be better off dead or of hurting  yourself in some way Not at all   Total Score: 9     If you checked off any problems, how difficult have these problems made it for you to do your work, take care of things at home, or get along with other people?      Developed by Mina Castro, Tatyana Capellan, Gerson Mendes and colleagues, with an educational roque from [employer] No permission required to reproduce, translate, display or distribute. permission required to reproduce, translate, display or distribute.

## 2023-04-04 NOTE — PATIENT INSTRUCTIONS
Increase Levetiracetam 1500 mg twice a day   Continue Carbatrol 400 mg twice a day   Continue Cobalt oral suspension 1200 mg of CBD and 60 mg THC.  Patient takes 3 mL b.i.d.  Continue Tangerine oral suspension, which is  mg and traces of CBD 3 mL daily at noon.  Lorazepam 0.5 mg night (she was suppose to take it prn, but, she takes it every night)  Certified for cannabis     We will revisit Epilepsy Surgery Test later in summer 2023 after she heals from left shoulder injury:   EMU Video EEG for presurgical   MRI brain (she has vagus nerve stimulator) ordered  PET scan and Video EEG ordered   Neuropsychology needed for epilepsy surgery   Mental Health - she has established care with therapist. Continue working with talk therapist for self care    We reviewed goals of epilepsy surgery: seizure reduction. If seizure device is placed patient will have to follow up every 3 months.  Patient was frankly told that our goal is seizure reduction, not seizure freedom and I do not think patient will be able to drive after surgery. More so, in rare instances patients who undergo epilepsy surgery may have worse seizures, mood changes, surgrical risk (stroke/infection/bleeding/death).     Guadalupe Hernandez MD

## 2023-04-04 NOTE — LETTER
"2023       RE: Mili Mane  : 1965   MRN: [id number]      Dear Colleague,    Thank you for referring your patient, Mili Mane, to the [facility] EPILEPSY CARE at [facility]. Please see a copy of my visit note below.    [facility]    Patient:  Mili Mane  :  1965   Age:  57 year old   Today's Office Visit:  2023      Epilepsy History:  Her seizure started at age 18 months, early on she had \"petit mal\" which continued to age 11.  When she was born she had \" some oxygen issues at birth, then as infant she had recurrent fever\". Early childhood she was on phenobarbital, phenytoin, mysoline and she continued to have starting spells several time per week.  It seems over the years she has tried several seizure medications but continues to have high seizure burden.  Her recurrent spells seem consistent with focal impaired seizures.She followed with Dr. Kendall for 33 years.  Interval history: She changed visit to telephone call. Mili Mane is 57 year old right handed who presents with history epilepsy. Her niece Talya is with her. is not accompanied with sister Patricia (sister) and Pat (cousin). She has 7 seizure per month. 2023 she had 4 seizure. She is taking care of her ill father and is stressed. She broke her left shoulder 2 months ago and she has to go to physical therapy. Currently on antiepileptic drug she has fatigue, has brain fog, does have brain fog.  We spent the entire visit discussing importance of epilepsy surgery and better seizure control to mitigate cognitive decline over time associated with memory impairment and repetitive seizures.  Patient expressed understanding of these recommendations and would like to proceed with further epilepsy surgical evaluation.  Seizure types:   Seizure type 1: focal seizures with impairment in awareness  - Aura of ringing in ear, stares, last 30 " "seconds, started 18 months. Per Patricia she has a \"blank stare for minutes\". No automatisms. She calls these \"small\" she describes them as \"I have loss of awareness, I do not get hurt, and I do not fall. She may have 5-10 seizure per month.  She describes another type of seizure as \"medium\" seizures in which she has 0-1 per year.   Seizure type 2: generalized tonic-clonic convulsion - whole body stiffening and shaking, she bites her cheek, may she had under 10 in her lifetime. Last \"hard seizure\" was 11/2021. In 11/2021 she was walking her dog, she fell and was confused.   Current antiepileptic drugs:   Levetiracetam 1250 mg twice a day   Carbatrol 400 mg twice a day   Cobalt oral suspension 1200 mg of CBD and 60 mg THC.  Patient takes 3 mL b.i.d.  Tangerine oral suspension, which is  mg and traces of CBD 3 mL daily at noon.  Lorazepam 0.5 mg night (she was suppose to take it prn, but, she takes it every night)  Precipitating factors:   Sleep Deprivation, Stress and Failure to take AED  Current Outpatient Medications   Medication Sig Dispense Refill    atenolol (TENORMIN) 25 MG tablet Take 25 mg by mouth every evening      atenolol (TENORMIN) 50 MG tablet Take 50 mg by mouth every morning      CARBATROL 200 MG 12 hr capsule TAKE TWO CAPSULES BY MOUTH EVERY MORNING AND TAKE TWO CAPSULES BY MOUTH EVERY EVENING 360 capsule 3    levETIRAcetam (KEPPRA) 500 MG tablet TAKE TWO TABLETS  AND ONE-HALF TABLET (1250 MG)  BY MOUTH TWICE A  tablet 3    LORazepam (ATIVAN) 0.5 MG tablet TAKE ONE TABLET BY MOUTH EVERY EVENING 90 tablet 1    medical cannabis (Patient's own supply) See Admin Instructions (The purpose of this order is to document that the patient reports taking medical cannabis.  This is not a prescription, and is not used to certify that the patient has a qualifying medical condition.)      Multiple Vitamins-Minerals (CENTRUM SILVER) per tablet Take 1 tablet by mouth daily       Perceived AED Side Effects: " "Yes  Medication Notes:   AED Medication Compliance:  compliant most of the time  Using a pill box:  Yes  Past AEDs:    Levetiracetam - started in . She is not sure if it helps her seizures.   Carbatrol - started at age 1.   Celontin - \"thorat starting closing and had to go to hospital and placed on breathing\"  Phenytoin  - rash with phenytoin    Mysoline   Phenobarbital   Felbamate - did not work per her memory.   Psycho-Social History: Mili Mane currently lives with dad, she is her father's primary caregiver. Mom passed away . ,  She is currently not working.  Her sister is very supportive and so is her family members.She lives with dad, he is 93, he is \"hard on her and stresses her out\".   Currently, patient denies feeling depressed, denies feeling anhedonia, denies suicidal  thoughts, and denies having feelings of excessive guilt/worthlessness.  Does not smoke, rare alcohol use, no recreational drug use. We reviewed importance of mental and emotional wellbeing and impact on health.   Driving:  Currently patient is:  Not driving.  Previous Evaluations for Epilepsy:   EE:   SUMMARY OF 2 DAYS OF AMBULATORY EEG RECORDING:  This is an abnormal EEG due to the presence of:   1.  Intermittent bursts of mixed theta and delta slowing during this recording during wakefulness.   2.  Bilateral temporal independent epileptiform activities during this recording.  No clinical or electrographic seizures were observed during this recording.       MRI of Brain: None on file  Exam:    Ht 5' 2\" (157.5 cm)   Wt 130 lb (59 kg)   BMI 23.78 kg/m       Wt Readings from Last 5 Encounters:   23 130 lb (59 kg)   23 146 lb (66.2 kg)   22 146 lb 6.4 oz (66.4 kg)   22 149 lb 12.8 oz (67.9 kg)   19 145 lb (65.8 kg)       Alert, orientated, speech is fluent    Impression: Focal epilepsy    Discussion: Ms. Brown is a 57-year-old right-handed female who presents with medically refractory " epilepsy.  Video EEG evaluation in 2014 showed bitemporal lobe epileptiform discharges.  Her seizure semiology seems consistent with temporal lobe epilepsy with progression to bilateral tonic-clonic seizures.  She is currently on levetiracetam and Carbatrol.  We will increase levetiracetam 1500 mg twice a day. She has left shoulder fractured and is working with physical therapy. We will revisit Epilepsy Surgery Test later in summer 2023 after she heals from left shoulder injury:     On today's visit we reviewed the importance of better seizure control management and the role of epilepsy surgery, specifically neuromodulation.  She expressed understanding of these recommendations and would like to proceed with epilepsy surgery.  Patient was clearly told that she will continue to have seizures most likely, however reducing her seizure burden will reduce risk of bodily injury and memory decline over time.  I suspect she may have bitemporal lobe epilepsy and may need deep brain stimulator device or RNS device.  She already has a vagus nerve stimulator.      Plan :        Increase Levetiracetam 1500 mg twice a day   Continue Carbatrol 400 mg twice a day   Continue Cobalt oral suspension 1200 mg of CBD and 60 mg THC.  Patient takes 3 mL b.i.d.  Continue Tangerine oral suspension, which is  mg and traces of CBD 3 mL daily at noon.  Lorazepam 0.5 mg night (she was suppose to take it prn, but, she takes it every night)  Certified for cannabis     We will revisit Epilepsy Surgery Test later in summer 2023 after she heals from left shoulder injury:   EMU Video EEG for presurgical   MRI brain (she has vagus nerve stimulator) ordered  PET scan and Video EEG ordered   Neuropsychology needed for epilepsy surgery   Mental Health - she has established care with therapist. Continue working with talk therapist for self care    We reviewed goals of epilepsy surgery: seizure reduction. If seizure device is placed patient will have  to follow up every 3 months.  Patient was frankly told that our goal is seizure reduction, not seizure freedom and I do not think patient will be able to drive after surgery. More so, in rare instances patients who undergo epilepsy surgery may have worse seizures, mood changes, surgrical risk (stroke/infection/bleeding/death).     I spent 41 minutes in total today to provide comprehensive  medical care.   I spent 5 minutes writing the note and placing orders.   I spent 2 minutes  reviewing the chart.     The rest of the time was spent with the patient in face to face interview. During this time key medical decisions were made with review of medical chart prior to visit, visit with patient, counseling/education, and post visit work, including documentation of note on the day of visit. I addressed all questions the patient/caregiver raised in regards to epilepsy or related medical questions.       Guadalupe Hernandez MD   Epilepsy Staff           Virtual Visit Details    Type of service:  Telephone Visit   Phone call duration: 21 minutes     Depression Response    Patient completed the PHQ-9 assessment for depression and scored >9? No  Question 9 on the PHQ-9 was positive for suicidality? No  Does patient have current mental health provider? Yes    Is this a virtual visit? Yes   Does patient have suicidal ideation (positive question 9)? No - offer to place Mental Health Referral.  Patient declined referral/not needed    I personally notified the following: visit provider    PATIENT HEALTH QUESTIONNAIRE-9 (PHQ - 9)    Over the last 2 weeks, how often have you been bothered by any of the following problems?    1. Little interest or pleasure in doing things -  Several days   2. Feeling down, depressed, or hopeless -  More than half the days   3. Trouble falling or staying asleep, or sleeping too much - More than half the days   4. Feeling tired or having little energy -  More than half the days   5. Poor appetite or  overeating -  Several days   6. Feeling bad about yourself - or that you are a failure or have let yourself or your family down -  Several days   7. Trouble concentrating on things, such as reading the newspaper or watching television - Not at all   8. Moving or speaking so slowly that other people could have noticed? Or the opposite - being so fidgety or restless that you have been moving around a lot more than usual Not at all   9. Thoughts that you would be better off dead or of hurting  yourself in some way Not at all   Total Score: 9     If you checked off any problems, how difficult have these problems made it for you to do your work, take care of things at home, or get along with other people?      Developed by Mina Castro, Tatyana Capellan, Gerson Mendes and colleagues, with an educational roque from [employer] No permission required to reproduce, translate, display or distribute. permission required to reproduce, translate, display or distribute.

## 2023-04-04 NOTE — NURSING NOTE
PHQ-9 score is 9.     Is the patient currently in the state of [place]? YES    Visit mode:TELEPHONE    If the visit is dropped, the patient can be reconnected by: TELEPHONE VISIT: Phone number:     Will anyone else be joining the visit? NO    How would you like to obtain your AVS? MyChart    Are changes needed to the allergy or medication list? YES: Pt states taking Ibuprofen and Tylenol as needed due to the left shoulder fracture.     Reason for visit: Follow up appt, pt was unable to have an EEG due to  left shoulder fracture.     Medication and allergies have been reviewed.     Suleiman Lanza, VF

## 2023-04-08 ENCOUNTER — HEALTH MAINTENANCE LETTER (OUTPATIENT)
Age: 58
End: 2023-04-08

## 2023-07-27 ENCOUNTER — TELEPHONE (OUTPATIENT)
Dept: NEUROLOGY | Facility: CLINIC | Age: 58
End: 2023-07-27

## 2023-07-27 DIAGNOSIS — G40.209 PARTIAL SYMPTOMATIC EPILEPSY WITH COMPLEX PARTIAL SEIZURES, NOT INTRACTABLE, WITHOUT STATUS EPILEPTICUS (H): ICD-10-CM

## 2023-07-27 NOTE — TELEPHONE ENCOUNTER
Prior Authorization Retail Medication Request    Medication/Dose: Carbatrol 200 MG ER Capsules  ICD code (if different than what is on RX):    Previously Tried and Failed:  See Chart  Rationale:  See Chart    Insurance Name:    Insurance ID:        Pharmacy Information (if different than what is on RX)  Name:    Phone:

## 2023-07-31 RX ORDER — CARBAMAZEPINE 200 MG/1
CAPSULE, EXTENDED RELEASE ORAL
Qty: 360 CAPSULE | Refills: 1 | Status: SHIPPED | OUTPATIENT
Start: 2023-07-31 | End: 2023-08-04

## 2023-07-31 NOTE — TELEPHONE ENCOUNTER
CARBATROL 200 MG 12 hr capsule     Last Written Prescription Date:  1/2/23  Last Fill Quantity: 360,   # refills: 3  Sent to: [facility] - [place], [place] - [address]   Last Office Visit : 4/4/23  Future Office visit:  8/21/23    Requesting to: [facility] - [address] [address]  Phone: [phone] Fax: [phone]  Remaining refills sent to requested pharmacy.

## 2023-07-31 NOTE — TELEPHONE ENCOUNTER
Prior Authorization Approval    Authorization Effective Date: 7/1/2023  Authorization Expiration Date: 7/30/2024  Medication: Carbatrol 200 MG ER Capsules  Approved Dose/Quantity:   Reference #:     Insurance Company: [employer] Phone [phone] Fax [phone]  Expected CoPay:       CoPay Card Available:      Foundation Assistance Needed:    Which Pharmacy is filling the prescription (Not needed for infusion/clinic administered): [facility] - [address]  Pharmacy Notified: Yes  Patient Notified: Yes        Central Prior Authorization Team   Phone: [phone]    PA Initiation    Medication: Carbatrol 200 MG ER Capsules  Insurance Company: [employer] Phone [phone] Fax [phone]  Pharmacy Filling the Rx: [facility] - [address]  Filling Pharmacy Phone: [phone]  Filling Pharmacy Fax:    Start Date: 7/31/2023

## 2023-07-31 NOTE — TELEPHONE ENCOUNTER
Patient is asking that script be sent to       [facility] - [place], [place] - [address] [place], [place] [address] [facility] [id number]  Phone: [phone] Fax: [phone]

## 2023-08-01 DIAGNOSIS — G40.209 PARTIAL SYMPTOMATIC EPILEPSY WITH COMPLEX PARTIAL SEIZURES, NOT INTRACTABLE, WITHOUT STATUS EPILEPTICUS (H): ICD-10-CM

## 2023-08-02 NOTE — TELEPHONE ENCOUNTER
What is the concern that needs to be addressed by a nurse? Pharmacy is calling in looking to speak with nurse about medication     May a detailed message be left on voicemail? yes    Date of last office visit:     Message routed to: mincep

## 2023-08-04 RX ORDER — CARBAMAZEPINE 200 MG/1
CAPSULE, EXTENDED RELEASE ORAL
Qty: 360 CAPSULE | Refills: 1 | OUTPATIENT
Start: 2023-08-04

## 2023-08-04 RX ORDER — CARBAMAZEPINE 200 MG/1
CAPSULE, EXTENDED RELEASE ORAL
Qty: 360 CAPSULE | Refills: 1 | Status: SHIPPED | OUTPATIENT
Start: 2023-08-04 | End: 2024-01-09

## 2023-08-04 NOTE — TELEPHONE ENCOUNTER
What is the concern that needs to be addressed by a nurse? Patient is saying the pharmacy told her the prescription was canceled. Please call pharmacy to verify.and patient would like a call back.     May a detailed message be left on voicemail? yes    Date of last office visit:    Message routed to: [email or web address]

## 2023-08-04 NOTE — TELEPHONE ENCOUNTER
Pharmacy comment: The preferred generic is therapeutically equivalent to the brand name medication prescribed. It contains the same active ingredient and is available in the same strength and dosage form as the brand name.  PLEASE APPROVE this GENERIC SUBSTITUTION; your response can save your patient up to $      24.80  per year.

## 2023-08-04 NOTE — TELEPHONE ENCOUNTER
Spoke to patient and she requests Carbatrol be sent to [facility]. Will resend to correct pharmacy

## 2023-08-07 ENCOUNTER — TELEPHONE (OUTPATIENT)
Dept: NEUROLOGY | Facility: CLINIC | Age: 58
End: 2023-08-07

## 2023-08-07 NOTE — TELEPHONE ENCOUNTER
What is the concern that needs to be addressed by a nurse? Patient says she went on walk and experienced heart attack system, patient thinks vns battery is low and would like to speak with someone about it     May a detailed message be left on voicemail? yes    Date of last office visit:     Message routed to: mincep

## 2023-08-14 ENCOUNTER — TELEPHONE (OUTPATIENT)
Dept: NEUROLOGY | Facility: CLINIC | Age: 58
End: 2023-08-14

## 2023-08-14 NOTE — TELEPHONE ENCOUNTER
What is the concern that needs to be addressed by a nurse?     Patient is calling because she is concerned that she needs to get her VNS battery checked. She is scheduled for the next available in person appointment with David 11/21 but would like to get in sooner for a nurse or PA visit to check her battery.    Patient can be called back at her cell phone at [phone]    May a detailed message be left on voicemail? Yes    Date of last office visit: 4/4/2023- Telephone Visit    Message routed to: Rach DU

## 2023-08-16 ENCOUNTER — ALLIED HEALTH/NURSE VISIT (OUTPATIENT)
Dept: NEUROLOGY | Facility: CLINIC | Age: 58
End: 2023-08-16
Payer: COMMERCIAL

## 2023-08-16 DIAGNOSIS — G40.219 PARTIAL SYMPTOMATIC EPILEPSY WITH COMPLEX PARTIAL SEIZURES, INTRACTABLE, WITHOUT STATUS EPILEPTICUS (H): Primary | ICD-10-CM

## 2023-08-16 NOTE — PROGRESS NOTES
"[facility] VNS Interrogation Note  Patient: Mili Mane  : 1965   Age: 58 year old  Today's Office Visit: 2023  Last Office Visit: 2023 Last Visit with:   Primary [facility] Provider: Guadalupe Hernandez M.D.  Interval History: Patient seen today for a VNS check following an episode of chest  discomfort she feels is concerning for side effects from stimulator.  The supervising provider of the day , Dr.Thaddeus Gillespie, was available on site at all times during the visit.  Patient had reported \"she went on walk and experienced heart attack system, patient thinks vns battery is low and would like to speak with someone about it  \"  Upon questioning, the pain was  Medications:  Current Outpatient Medications   Medication Sig Dispense Refill    atenolol (TENORMIN) 25 MG tablet Take 25 mg by mouth every evening      atenolol (TENORMIN) 50 MG tablet Take 50 mg by mouth every morning      CARBATROL 200 MG 12 hr capsule TAKE TWO CAPSULES BY MOUTH EVERY MORNING AND TAKE TWO CAPSULES BY MOUTH EVERY EVENING 360 capsule 1    levETIRAcetam (KEPPRA) 500 MG tablet TAKE THREE TABLETS  (1500 MG)  BY MOUTH TWICE A  tablet 3    LORazepam (ATIVAN) 0.5 MG tablet TAKE ONE TABLET BY MOUTH EVERY EVENING 90 tablet 1    medical cannabis (Patient's own supply) See Admin Instructions (The purpose of this order is to document that the patient reports taking medical cannabis.  This is not a prescription, and is not used to certify that the patient has a qualifying medical condition.)      Multiple Vitamins-Minerals (CENTRUM SILVER) per tablet Take 1 tablet by mouth daily          Perceived VNS Side Effects:  Pain. Patient had called in and reported to staff\"she went on walk and experienced heart attack system, patient thinks vns battery is low and would like to speak with someone about it  \"  Symptoms had been discussed with the patient and did not sound typical for cardiac concerns.      VNS Management (VNS " Flowsheet)      6/30/2022     1:00 PM 8/16/2023     1:00 PM   Vagus Nerve Stimulation   MD GABINO Hernandez   Implant Date 6/20/2017 6/20/2017   Model Number [id number]   Serial Number [id number]   Patient ID [email or web address]   VNS Interrogation Incoming Parameters    Date 6/30/2022 8/16/2023   Output Current (mA) 1.25    Signal Frequency (Hz) 20    Pulse Width (Mu sec) 250    Signal Time On (sec) 30    Signal Time Off 5    Output Current (mA)  1.25   Signal Frequency (Hz)  20   Pulse Width (usec)  250   Signal ON Time (sec)  30   Signal OFF Time (min)  5   Magent Output Current (mA) 1.5 1.5   Magent ON Time (sec) 60 60   Magnet Pulse Width (usec) 250 250   AutoStim Output Current (mA) 1.25 1.25   AutoStim Pulse Width (usec) 250 250   AutoStim ON Time (sec) 30 30   Tachycardia Detection ON/OFF on on   Heartbeat Detection Sensitivity (1-5) 2 2   Perform Verify Heartbeat Detection (y/n) No No   Threshold for AutoStim (%) 40 40   Output Current (OK/Low) ok OK   Current Delivered (mA) 1.25 1.25   Impedance Value (Ohms) 2203 2296   Battery Status Indicator (Green/Yellow/Red, %) 50-75% Green, 25%-50%   IFI (No/Yes) No    Number of Magnet Swipes 455    Average AutoStims per day 42.68 32.48     Additional diagnostic testing was performed, including a system test, and an auto-stim test.  All reported results were as expected for a correctly functioning device, specifically no evidence of lead break or short circuit was seen.  Daily trends were checked, and the date of the chest pain (8/6/2023) was noted to have a higher number of auto-stimulations that surrounding days, however was still within a range that had been experienced on other days.         EXAM:  There were no vitals taken for this visit.   General Appearance: Normal  Gait: Normal  Attention Span: Normal  Language: Normal  Extraocular Movements: Not Examined  Coordination: Normal  Visual Fields: Not Examined  Facial Sensation: Not Examined  Facial Strength: Not Examined  Tongue  Strength: Not Examined  Limb Strength: Not Examined  Limb Tone: Not Examined  Limb Sensation: Not Examined  General Physical Findings: Normal    ASSESSMENT/PLAN:   The implanted VNS appears to be functioning within desired parameters.  The patient had reported some chest discomfort while out walking on August 6th 2023. By her report this discomfort was in the lower part of her chest, around the level of her lowest ribs, and spread across both sides. There was additional discomfort across both sides of her chest at the level of the VNS. She denies shortness of breath with this sensation, but said it was strong enough to distract her from her walk.  He main concern was that the VNS battery was getting low  Patient did not seek medical attention at the time of the pain, and has not scheduled an appointment with her PCP.  We discussed that the VNS was functioning correctly, and the battery was not about to run out.  i explained there are a number of possible causes of chest pain, and that she should make an appointment with her PCP for further work up.  We dicussed cardinal signs of cardiac pain and I told the patient if she experiences this she should contact EMS and seek urgent medical attention.  Patient reports that she has a history of GI issues including heart bun, and has loose stool at times. She felt this was related to the medical cannabis  use  We discussed how stress can affect GI and cause symptoms of chest pains. Subjectively patient appeared quite anxious. She reported that she has a lot of stress at home, living with her 94 year old father, whom she says has started to lose hearing and is becoming more withdrawn. I asked if she was able to find time for herself and do activities she enjoyed away from caregiver duties, and she responded that she does.  Patient agreed to follow with her PCP for evaluation of her chest discomfort, for evaluation of her loose stools, and to discuss options for anxiety  management and stress reduction. I provided reassurance that the VNS is functioning as intended, and is not about to have battery depletion.  Patient will follow up with  in November as already scheduled, and will call us if she has other questions or concerns

## 2023-09-23 DIAGNOSIS — G40.109 LOCALIZATION-RELATED FOCAL EPILEPSY WITH SIMPLE PARTIAL SEIZURES (H): ICD-10-CM

## 2023-09-25 RX ORDER — LORAZEPAM 0.5 MG/1
TABLET ORAL
Qty: 90 TABLET | Refills: 1 | Status: SHIPPED | OUTPATIENT
Start: 2023-09-25 | End: 2024-03-20

## 2023-09-25 NOTE — TELEPHONE ENCOUNTER
LORazepam (ATIVAN) 0.5 MG tablet   90 tablet 1 1/2/2023 4/4/2023  [facility]      Guadalupe Hernandez MD  Neurology       Routed because:   controlled

## 2023-11-21 ENCOUNTER — OFFICE VISIT (OUTPATIENT)
Dept: NEUROLOGY | Facility: CLINIC | Age: 58
End: 2023-11-21
Payer: COMMERCIAL

## 2023-11-21 ENCOUNTER — HOSPITAL ENCOUNTER (OUTPATIENT)
Age: 58
End: 2023-11-21
Attending: PSYCHIATRY & NEUROLOGY
Payer: COMMERCIAL

## 2023-11-21 VITALS
OXYGEN SATURATION: 99 % | TEMPERATURE: 97.2 F | SYSTOLIC BLOOD PRESSURE: 174 MMHG | WEIGHT: 132.2 LBS | BODY MASS INDEX: 24.18 KG/M2 | DIASTOLIC BLOOD PRESSURE: 90 MMHG | HEART RATE: 66 BPM

## 2023-11-21 DIAGNOSIS — G40.209 PARTIAL SYMPTOMATIC EPILEPSY WITH COMPLEX PARTIAL SEIZURES, NOT INTRACTABLE, WITHOUT STATUS EPILEPTICUS (H): ICD-10-CM

## 2023-11-21 LAB
ALT SERPL W P-5'-P-CCNC: 20 U/L (ref 0–50)
AST SERPL W P-5'-P-CCNC: 24 U/L (ref 0–45)
BASOPHILS # BLD AUTO: 0 10E3/UL (ref 0–0.2)
BASOPHILS NFR BLD AUTO: 1 %
EOSINOPHIL # BLD AUTO: 0.1 10E3/UL (ref 0–0.7)
EOSINOPHIL NFR BLD AUTO: 2 %
ERYTHROCYTE [DISTWIDTH] IN BLOOD BY AUTOMATED COUNT: 12.6 % (ref 10–15)
HCT VFR BLD AUTO: 33.5 % (ref 35–47)
HGB BLD-MCNC: 11 G/DL (ref 11.7–15.7)
IMM GRANULOCYTES # BLD: 0 10E3/UL
IMM GRANULOCYTES NFR BLD: 0 %
LEVETIRACETAM SERPL-MCNC: 48.6 ΜG/ML (ref 10–40)
LYMPHOCYTES # BLD AUTO: 1.8 10E3/UL (ref 0.8–5.3)
LYMPHOCYTES NFR BLD AUTO: 27 %
MCH RBC QN AUTO: 29.8 PG (ref 26.5–33)
MCHC RBC AUTO-ENTMCNC: 32.8 G/DL (ref 31.5–36.5)
MCV RBC AUTO: 91 FL (ref 78–100)
MONOCYTES # BLD AUTO: 0.5 10E3/UL (ref 0–1.3)
MONOCYTES NFR BLD AUTO: 7 %
NEUTROPHILS # BLD AUTO: 4.2 10E3/UL (ref 1.6–8.3)
NEUTROPHILS NFR BLD AUTO: 63 %
NRBC # BLD AUTO: 0 10E3/UL
NRBC BLD AUTO-RTO: 0 /100
PLATELET # BLD AUTO: 357 10E3/UL (ref 150–450)
RBC # BLD AUTO: 3.69 10E6/UL (ref 3.8–5.2)
WBC # BLD AUTO: 6.6 10E3/UL (ref 4–11)

## 2023-11-21 RX ORDER — LEVETIRACETAM 500 MG/1
TABLET ORAL
Qty: 540 TABLET | Refills: 3 | Status: SHIPPED | OUTPATIENT
Start: 2023-11-21 | End: 2024-05-29

## 2023-11-21 ASSESSMENT — PAIN SCALES - GENERAL: PAINLEVEL: NO PAIN (0)

## 2023-11-21 NOTE — PROGRESS NOTES
"[facility] Progress Note    Patient:  Mili Mane  :  1965   Age:  58 year old   Today's Office Visit:  2023    Epilepsy History:  Her seizure started at age 18 months, early on she had \"petit mal\" which continued to age 11.  When she was born she had \" some oxygen issues at birth, then as infant she had recurrent fever\". Early childhood she was on phenobarbital, phenytoin, mysoline and she continued to have starting spells several time per week.  It seems over the years she has tried several seizure medications but continues to have high seizure burden.  Her recurrent spells seem consistent with focal impaired seizures.She followed with Dr. Kendall for 33 years.  Interval history:   she is accompanied today with her friend Batsheva (friend/cousin), her sister Patricia and niece Talya is not with her today. She continues to have a high seizure burden with 5-10 seizures per day.   On last visit we increased levetiracetam however this did not seem to make a difference in seizure frequency.  She states she is moving out of her father's house and there is a lot of internal family issues right now which have been very stressful for her.  She is working with a therapist.  She would like to put epilepsy surgery work-up on hold that she is not emotionally ready to do this work.    She had several questions today that I addressed and answered.  Specifically she had questions about blood pressure monitoring which I defer to her primary care doctor, we recertified her on [place] cannabis program, and she needed form completion for [employer] which I forwarded to her nurse.  I reviewed the importance of epilepsy surgery and better seizure control and how it impacts multiple domains of your life such as depression, anxiety, memory decline, physical harm from seizures, etc.  Patient expressed understanding of these recommendations and would like to put this on hold for now -  epilepsy surgical " "evaluation.  Seizure types:   Seizure type 1: focal seizures with impairment in awareness  - Aura of ringing in ear, stares, last 30 seconds, started 18 months. Per Patrciia she has a \"blank stare for minutes\". No automatisms. She calls these \"small\" she describes them as \"I have loss of awareness, I do not get hurt, and I do not fall. She may have 5-10 seizure per month.  She describes another type of seizure as \"medium\" seizures in which she has 0-1 per year.   Seizure type 2: generalized tonic-clonic convulsion - whole body stiffening and shaking, she bites her cheek, may she had under 10 in her lifetime. Last \"hard seizure\" was 11/2021. In 11/2021 she was walking her dog, she fell and was confused.   Current antiepileptic drugs:   Levetiracetam 1250 mg twice a day   Carbatrol 400 mg twice a day   Cobalt oral suspension 1200 mg of CBD and 60 mg THC.  Patient takes 3 mL b.i.d.  Tangerine oral suspension, which is  mg and traces of CBD 3 mL daily at noon.  Lorazepam 0.5 mg night (she was suppose to take it prn, but, she takes it every night)  Precipitating factors:   Sleep Deprivation, Stress and Failure to take AED  Current Outpatient Medications   Medication Sig Dispense Refill    atenolol (TENORMIN) 25 MG tablet Take 25 mg by mouth every evening      atenolol (TENORMIN) 50 MG tablet Take 50 mg by mouth every morning      CARBATROL 200 MG 12 hr capsule TAKE TWO CAPSULES BY MOUTH EVERY MORNING AND TAKE TWO CAPSULES BY MOUTH EVERY EVENING 360 capsule 1    levETIRAcetam (KEPPRA) 500 MG tablet TAKE THREE TABLETS  (1500 MG)  BY MOUTH TWICE A  tablet 3    LORazepam (ATIVAN) 0.5 MG tablet TAKE ONE TABLET BY MOUTH EVERY EVENING 90 tablet 1    medical cannabis (Patient's own supply) See Admin Instructions (The purpose of this order is to document that the patient reports taking medical cannabis.  This is not a prescription, and is not used to certify that the patient has a qualifying medical condition.)      " "Multiple Vitamins-Minerals (CENTRUM SILVER) per tablet Take 1 tablet by mouth daily       Perceived AED Side Effects: Yes  Medication Notes:   AED Medication Compliance:  compliant most of the time  Using a pill box:  Yes  Past AEDs:    Levetiracetam - started in . She is not sure if it helps her seizures.   Carbatrol - started at age 1.   Celontin - \"thorat starting closing and had to go to hospital and placed on breathing\"  Phenytoin  - rash with phenytoin    Mysoline   Phenobarbital   Felbamate - did not work per her memory.   Lacosamide in   - she had persistent tachycardia   Psycho-Social History: Mili Mane currently lives with dad, she is her father's primary caregiver. Mom passed away . ,  She is currently not working.  Her sister is very supportive and so is her family members.She lives with dad, he is 93, he is \"hard on her and stresses her out\".   Currently, patient denies feeling depressed, denies feeling anhedonia, denies suicidal  thoughts, and denies having feelings of excessive guilt/worthlessness.  Does not smoke, rare alcohol use, no recreational drug use. We reviewed importance of mental and emotional wellbeing and impact on health.   Driving:  Currently patient is:  Not driving.  Previous Evaluations for Epilepsy:   EE:   SUMMARY OF 2 DAYS OF AMBULATORY EEG RECORDING:  This is an abnormal EEG due to the presence of:   1.  Intermittent bursts of mixed theta and delta slowing during this recording during wakefulness.   2.  Bilateral temporal independent epileptiform activities during this recording.  No clinical or electrographic seizures were observed during this recording.       MRI of Brain: None on file  Exam:    BP (!) 174/90   Pulse 66   Temp 97.2  F (36.2  C) (Temporal)   Wt 132 lb 3.2 oz (60 kg)   SpO2 99%   BMI 24.18 kg/m       Wt Readings from Last 5 Encounters:   23 132 lb 3.2 oz (60 kg)   23 130 lb (59 kg)   23 146 lb (66.2 kg) "   06/30/22 146 lb 6.4 oz (66.4 kg)   01/26/22 149 lb 12.8 oz (67.9 kg)       Alert, orientated, speech is fluent    Impression: Focal epilepsy    Discussion: Ms. Brown is a 57-year-old right-handed female who presents with medically refractory epilepsy.  Video EEG evaluation in 2014 showed bitemporal lobe epileptiform discharges.  Her seizure semiology seems consistent with temporal lobe epilepsy with progression to bilateral tonic-clonic seizures.  She is currently on levetiracetam and Carbatrol.  We will   We will start Fycompa to evaluate if this reduces seizures since it has a different mechanism of action.   She states she has multiple personal stressors and is working with a therapist at this time.  She is emotionally not ready to start epilepsy surgery evaluation.    On today's visit we reviewed the importance of better seizure control management and the role of epilepsy surgery, specifically neuromodulation.  She expressed understanding of these recommendations and would like to proceed with epilepsy surgery.  Patient was clearly told that she will continue to have seizures most likely, however reducing her seizure burden will reduce risk of bodily injury and memory decline over time.  I suspect she may have bitemporal lobe epilepsy and may need deep brain stimulator device or RNS device.  She already has a vagus nerve stimulator.      Plan :    Start fycompa   Week 1: 2 mg per day (morning)   Week 2- onward: 4 mg per day (morning)     Continue Levetiracetam 1500 mg twice a day   Continue Carbatrol 400 mg twice a day   Continue Cobalt oral suspension 1200 mg of CBD and 60 mg THC.  Patient takes 3 mL b.i.d.  Continue Tangerine oral suspension, which is  mg and traces of CBD 3 mL daily at noon.  Lorazepam 0.5 mg night (she was suppose to take it prn, but, she takes it every night)  Certified for cannabis     Mental Health - she has established care with therapist. Continue working with talk  therapist for self care    Sent nurse [employer] form to be completed     Re-certified Cannabis     Blood pressure questions to be addressed by primary care provider     Mental Health - she has established care with therapist. Continue working with talk therapist for self care    We reviewed goals of epilepsy surgery: seizure reduction. If seizure device is placed patient will have to follow up every 3 months.  Patient was frankly told that our goal is seizure reduction, not seizure freedom and I do not think patient will be able to drive after surgery. More so, in rare instances patients who undergo epilepsy surgery may have worse seizures, mood changes, surgrical risk (stroke/infection/bleeding/death).       I spent 41 minutes in total today to provide comprehensive  medical care.   I spent 5 minutes writing the note and placing orders.   I spent 2 minutes  reviewing the chart.     The rest of the time was spent with the patient in face to face interview. During this time key medical decisions were made with review of medical chart prior to visit, visit with patient, counseling/education, and post visit work, including documentation of note on the day of visit. I addressed all questions the patient/caregiver raised in regards to epilepsy or related medical questions.       Guadalupe Hernandez MD   Epilepsy Staff

## 2023-11-21 NOTE — PATIENT INSTRUCTIONS
Start fycompa   Week 1: 2 mg per day (morning)   Week 2- onward: 4 mg per day (morning)     Continue Levetiracetam 1500 mg twice a day   Continue Carbatrol 400 mg twice a day   Continue Cobalt oral suspension 1200 mg of CBD and 60 mg THC.  Patient takes 3 mL b.i.d.  Continue Tangerine oral suspension, which is  mg and traces of CBD 3 mL daily at noon.  Lorazepam 0.5 mg night (she was suppose to take it prn, but, she takes it every night)  Certified for cannabis     Mental Health - she has established care with therapist. Continue working with talk therapist for self care    Sent nurse [employer] form to be completed     Re-certified Cannabis     Blood pressure questions to be addressed by primary care provider     Guadalupe Hernandez MD

## 2023-11-21 NOTE — LETTER
"2023       RE: Mili Mane  : 1965   MRN: [id number]      Dear Colleague,    Thank you for referring your patient, Mili Mane, to the [facility], [place], operated by [facility] at [facility]. Please see a copy of my visit note below.    [facility]    Patient:  Mili Mane  :  1965   Age:  58 year old   Today's Office Visit:  2023    Epilepsy History:  Her seizure started at age 18 months, early on she had \"petit mal\" which continued to age 11.  When she was born she had \" some oxygen issues at birth, then as infant she had recurrent fever\". Early childhood she was on phenobarbital, phenytoin, mysoline and she continued to have starting spells several time per week.  It seems over the years she has tried several seizure medications but continues to have high seizure burden.  Her recurrent spells seem consistent with focal impaired seizures.She followed with Dr. Kendall for 33 years.  Interval history:   she is accompanied today with her friend Batsheva (friend/cousin), her sister Patricia and niece Talya is not with her today. She continues to have a high seizure burden with 5-10 seizures per day.   On last visit we increased levetiracetam however this did not seem to make a difference in seizure frequency.  She states she is moving out of her father's house and there is a lot of internal family issues right now which have been very stressful for her.  She is working with a therapist.  She would like to put epilepsy surgery work-up on hold that she is not emotionally ready to do this work.    She had several questions today that I addressed and answered.  Specifically she had questions about blood pressure monitoring which I defer to her primary care doctor, we recertified her on [place] cannabis program, and she needed form completion for [employer] which I forwarded to her nurse.  I reviewed the importance " "of epilepsy surgery and better seizure control and how it impacts multiple domains of your life such as depression, anxiety, memory decline, physical harm from seizures, etc.  Patient expressed understanding of these recommendations and would like to put this on hold for now -  epilepsy surgical evaluation.  Seizure types:   Seizure type 1: focal seizures with impairment in awareness  - Aura of ringing in ear, stares, last 30 seconds, started 18 months. Per Patricia she has a \"blank stare for minutes\". No automatisms. She calls these \"small\" she describes them as \"I have loss of awareness, I do not get hurt, and I do not fall. She may have 5-10 seizure per month.  She describes another type of seizure as \"medium\" seizures in which she has 0-1 per year.   Seizure type 2: generalized tonic-clonic convulsion - whole body stiffening and shaking, she bites her cheek, may she had under 10 in her lifetime. Last \"hard seizure\" was 11/2021. In 11/2021 she was walking her dog, she fell and was confused.   Current antiepileptic drugs:   Levetiracetam 1250 mg twice a day   Carbatrol 400 mg twice a day   Cobalt oral suspension 1200 mg of CBD and 60 mg THC.  Patient takes 3 mL b.i.d.  Tangerine oral suspension, which is  mg and traces of CBD 3 mL daily at noon.  Lorazepam 0.5 mg night (she was suppose to take it prn, but, she takes it every night)  Precipitating factors:   Sleep Deprivation, Stress and Failure to take AED  Current Outpatient Medications   Medication Sig Dispense Refill    atenolol (TENORMIN) 25 MG tablet Take 25 mg by mouth every evening      atenolol (TENORMIN) 50 MG tablet Take 50 mg by mouth every morning      CARBATROL 200 MG 12 hr capsule TAKE TWO CAPSULES BY MOUTH EVERY MORNING AND TAKE TWO CAPSULES BY MOUTH EVERY EVENING 360 capsule 1    levETIRAcetam (KEPPRA) 500 MG tablet TAKE THREE TABLETS  (1500 MG)  BY MOUTH TWICE A  tablet 3    LORazepam (ATIVAN) 0.5 MG tablet TAKE ONE TABLET BY MOUTH EVERY " "EVENING 90 tablet 1    medical cannabis (Patient's own supply) See Admin Instructions (The purpose of this order is to document that the patient reports taking medical cannabis.  This is not a prescription, and is not used to certify that the patient has a qualifying medical condition.)      Multiple Vitamins-Minerals (CENTRUM SILVER) per tablet Take 1 tablet by mouth daily       Perceived AED Side Effects: Yes  Medication Notes:   AED Medication Compliance:  compliant most of the time  Using a pill box:  Yes  Past AEDs:    Levetiracetam - started in . She is not sure if it helps her seizures.   Carbatrol - started at age 1.   Celontin - \"thorat starting closing and had to go to hospital and placed on breathing\"  Phenytoin  - rash with phenytoin    Mysoline   Phenobarbital   Felbamate - did not work per her memory.   Lacosamide in   - she had persistent tachycardia   Psycho-Social History: Mili Mane currently lives with dad, she is her father's primary caregiver. Mom passed away . ,  She is currently not working.  Her sister is very supportive and so is her family members.She lives with dad, he is 93, he is \"hard on her and stresses her out\".   Currently, patient denies feeling depressed, denies feeling anhedonia, denies suicidal  thoughts, and denies having feelings of excessive guilt/worthlessness.  Does not smoke, rare alcohol use, no recreational drug use. We reviewed importance of mental and emotional wellbeing and impact on health.   Driving:  Currently patient is:  Not driving.  Previous Evaluations for Epilepsy:   EE:   SUMMARY OF 2 DAYS OF AMBULATORY EEG RECORDING:  This is an abnormal EEG due to the presence of:   1.  Intermittent bursts of mixed theta and delta slowing during this recording during wakefulness.   2.  Bilateral temporal independent epileptiform activities during this recording.  No clinical or electrographic seizures were observed during this recording.     "   MRI of Brain: None on file  Exam:    BP (!) 174/90   Pulse 66   Temp 97.2  F (36.2  C) (Temporal)   Wt 132 lb 3.2 oz (60 kg)   SpO2 99%   BMI 24.18 kg/m       Wt Readings from Last 5 Encounters:   11/21/23 132 lb 3.2 oz (60 kg)   04/04/23 130 lb (59 kg)   01/02/23 146 lb (66.2 kg)   06/30/22 146 lb 6.4 oz (66.4 kg)   01/26/22 149 lb 12.8 oz (67.9 kg)       Alert, orientated, speech is fluent    Impression: Focal epilepsy    Discussion: Ms. Brown is a 57-year-old right-handed female who presents with medically refractory epilepsy.  Video EEG evaluation in 2014 showed bitemporal lobe epileptiform discharges.  Her seizure semiology seems consistent with temporal lobe epilepsy with progression to bilateral tonic-clonic seizures.  She is currently on levetiracetam and Carbatrol.  We will   We will start Fycompa to evaluate if this reduces seizures since it has a different mechanism of action.   She states she has multiple personal stressors and is working with a therapist at this time.  She is emotionally not ready to start epilepsy surgery evaluation.    On today's visit we reviewed the importance of better seizure control management and the role of epilepsy surgery, specifically neuromodulation.  She expressed understanding of these recommendations and would like to proceed with epilepsy surgery.  Patient was clearly told that she will continue to have seizures most likely, however reducing her seizure burden will reduce risk of bodily injury and memory decline over time.  I suspect she may have bitemporal lobe epilepsy and may need deep brain stimulator device or RNS device.  She already has a vagus nerve stimulator.      Plan :    Start fycompa   Week 1: 2 mg per day (morning)   Week 2- onward: 4 mg per day (morning)     Continue Levetiracetam 1500 mg twice a day   Continue Carbatrol 400 mg twice a day   Continue Cobalt oral suspension 1200 mg of CBD and 60 mg THC.  Patient takes 3 mL b.i.d.  Continue  Tangerine oral suspension, which is  mg and traces of CBD 3 mL daily at noon.  Lorazepam 0.5 mg night (she was suppose to take it prn, but, she takes it every night)  Certified for cannabis     Mental Health - she has established care with therapist. Continue working with talk therapist for self care    Sent nurse [employer] form to be completed     Re-certified Cannabis     Blood pressure questions to be addressed by primary care provider     Mental Health - she has established care with therapist. Continue working with talk therapist for self care    We reviewed goals of epilepsy surgery: seizure reduction. If seizure device is placed patient will have to follow up every 3 months.  Patient was frankly told that our goal is seizure reduction, not seizure freedom and I do not think patient will be able to drive after surgery. More so, in rare instances patients who undergo epilepsy surgery may have worse seizures, mood changes, surgrical risk (stroke/infection/bleeding/death).       I spent 41 minutes in total today to provide comprehensive  medical care.   I spent 5 minutes writing the note and placing orders.   I spent 2 minutes  reviewing the chart.     The rest of the time was spent with the patient in face to face interview. During this time key medical decisions were made with review of medical chart prior to visit, visit with patient, counseling/education, and post visit work, including documentation of note on the day of visit. I addressed all questions the patient/caregiver raised in regards to epilepsy or related medical questions.           Again, thank you for allowing me to participate in the care of your patient.      Sincerely,    Guadalupe Hernandez MD

## 2023-11-28 LAB
CARBAMAZEPINE EP SERPL-MCNC: 1.8 UG/ML
CARBAMAZEPINE SERPL-MCNC: 5.2 UG/ML

## 2024-01-09 DIAGNOSIS — G40.209 PARTIAL SYMPTOMATIC EPILEPSY WITH COMPLEX PARTIAL SEIZURES, NOT INTRACTABLE, WITHOUT STATUS EPILEPTICUS (H): ICD-10-CM

## 2024-01-10 ENCOUNTER — HOSPITAL ENCOUNTER (EMERGENCY)
Facility: CLINIC | Age: 59
Discharge: HOME | End: 2024-01-10
Attending: EMERGENCY MEDICINE | Admitting: EMERGENCY MEDICINE
Payer: COMMERCIAL

## 2024-01-10 VITALS
TEMPERATURE: 98.1 F | SYSTOLIC BLOOD PRESSURE: 138 MMHG | DIASTOLIC BLOOD PRESSURE: 79 MMHG | HEART RATE: 76 BPM | OXYGEN SATURATION: 97 % | RESPIRATION RATE: 16 BRPM

## 2024-01-10 DIAGNOSIS — R56.9 SEIZURE (H): ICD-10-CM

## 2024-01-10 LAB
ALBUMIN SERPL BCG-MCNC: 4.4 G/DL (ref 3.5–5.2)
ALBUMIN UR-MCNC: NEGATIVE MG/DL
ALP SERPL-CCNC: 108 U/L (ref 40–150)
ALT SERPL W P-5'-P-CCNC: 14 U/L (ref 0–50)
ANION GAP SERPL CALCULATED.3IONS-SCNC: 9 MMOL/L (ref 7–15)
APPEARANCE UR: CLEAR
AST SERPL W P-5'-P-CCNC: 22 U/L (ref 0–45)
BACTERIA #/AREA URNS HPF: ABNORMAL /HPF
BASOPHILS # BLD AUTO: 0 10E3/UL (ref 0–0.2)
BASOPHILS NFR BLD AUTO: 1 %
BILIRUB SERPL-MCNC: 0.2 MG/DL
BILIRUB UR QL STRIP: NEGATIVE
BUN SERPL-MCNC: 13 MG/DL (ref 8–23)
CALCIUM SERPL-MCNC: 9.8 MG/DL (ref 8.6–10)
CHLORIDE SERPL-SCNC: 101 MMOL/L (ref 98–107)
COLOR UR AUTO: ABNORMAL
CREAT SERPL-MCNC: 0.73 MG/DL (ref 0.51–0.95)
DEPRECATED HCO3 PLAS-SCNC: 28 MMOL/L (ref 22–29)
EGFRCR SERPLBLD CKD-EPI 2021: >90 ML/MIN/1.73M2
EOSINOPHIL # BLD AUTO: 0.1 10E3/UL (ref 0–0.7)
EOSINOPHIL NFR BLD AUTO: 2 %
ERYTHROCYTE [DISTWIDTH] IN BLOOD BY AUTOMATED COUNT: 12.5 % (ref 10–15)
GLUCOSE SERPL-MCNC: 110 MG/DL (ref 70–99)
GLUCOSE UR STRIP-MCNC: NEGATIVE MG/DL
HCT VFR BLD AUTO: 35.7 % (ref 35–47)
HGB BLD-MCNC: 11.7 G/DL (ref 11.7–15.7)
HGB UR QL STRIP: NEGATIVE
IMM GRANULOCYTES # BLD: 0 10E3/UL
IMM GRANULOCYTES NFR BLD: 0 %
KETONES UR STRIP-MCNC: NEGATIVE MG/DL
LACTATE SERPL-SCNC: 0.8 MMOL/L (ref 0.7–2)
LEUKOCYTE ESTERASE UR QL STRIP: NEGATIVE
LEVETIRACETAM SERPL-MCNC: 6.2 ΜG/ML (ref 10–40)
LYMPHOCYTES # BLD AUTO: 1.3 10E3/UL (ref 0.8–5.3)
LYMPHOCYTES NFR BLD AUTO: 24 %
MCH RBC QN AUTO: 30.2 PG (ref 26.5–33)
MCHC RBC AUTO-ENTMCNC: 32.8 G/DL (ref 31.5–36.5)
MCV RBC AUTO: 92 FL (ref 78–100)
MONOCYTES # BLD AUTO: 0.4 10E3/UL (ref 0–1.3)
MONOCYTES NFR BLD AUTO: 7 %
NEUTROPHILS # BLD AUTO: 3.7 10E3/UL (ref 1.6–8.3)
NEUTROPHILS NFR BLD AUTO: 66 %
NITRATE UR QL: NEGATIVE
NRBC # BLD AUTO: 0 10E3/UL
NRBC BLD AUTO-RTO: 0 /100
PH UR STRIP: 7 [PH] (ref 5–7)
PLATELET # BLD AUTO: 301 10E3/UL (ref 150–450)
POTASSIUM SERPL-SCNC: 4.3 MMOL/L (ref 3.4–5.3)
PROT SERPL-MCNC: 7.4 G/DL (ref 6.4–8.3)
RBC # BLD AUTO: 3.88 10E6/UL (ref 3.8–5.2)
RBC URINE: 0 /HPF
SODIUM SERPL-SCNC: 138 MMOL/L (ref 135–145)
SP GR UR STRIP: 1.01 (ref 1–1.03)
SQUAMOUS EPITHELIAL: <1 /HPF
UROBILINOGEN UR STRIP-MCNC: NORMAL MG/DL
WBC # BLD AUTO: 5.6 10E3/UL (ref 4–11)
WBC URINE: 1 /HPF

## 2024-01-10 PROCEDURE — 36415 COLL VENOUS BLD VENIPUNCTURE: CPT | Performed by: EMERGENCY MEDICINE

## 2024-01-10 PROCEDURE — 80177 DRUG SCRN QUAN LEVETIRACETAM: CPT | Performed by: EMERGENCY MEDICINE

## 2024-01-10 PROCEDURE — 250N000011 HC RX IP 250 OP 636: Mod: JZ | Performed by: EMERGENCY MEDICINE

## 2024-01-10 PROCEDURE — 83605 ASSAY OF LACTIC ACID: CPT | Performed by: EMERGENCY MEDICINE

## 2024-01-10 PROCEDURE — 99284 EMERGENCY DEPT VISIT MOD MDM: CPT | Mod: 25 | Performed by: EMERGENCY MEDICINE

## 2024-01-10 PROCEDURE — 99207 PR NO BILLABLE SERVICE THIS VISIT: CPT | Performed by: PSYCHIATRY & NEUROLOGY

## 2024-01-10 PROCEDURE — 85004 AUTOMATED DIFF WBC COUNT: CPT | Performed by: EMERGENCY MEDICINE

## 2024-01-10 PROCEDURE — 80053 COMPREHEN METABOLIC PANEL: CPT | Performed by: EMERGENCY MEDICINE

## 2024-01-10 PROCEDURE — 81001 URINALYSIS AUTO W/SCOPE: CPT | Performed by: EMERGENCY MEDICINE

## 2024-01-10 PROCEDURE — 99284 EMERGENCY DEPT VISIT MOD MDM: CPT | Performed by: EMERGENCY MEDICINE

## 2024-01-10 PROCEDURE — 258N000003 HC RX IP 258 OP 636: Performed by: EMERGENCY MEDICINE

## 2024-01-10 PROCEDURE — 96365 THER/PROPH/DIAG IV INF INIT: CPT | Performed by: EMERGENCY MEDICINE

## 2024-01-10 RX ORDER — CARBAMAZEPINE 200 MG/1
CAPSULE, EXTENDED RELEASE ORAL
Qty: 360 CAPSULE | Refills: 0 | Status: SHIPPED | OUTPATIENT
Start: 2024-01-10 | End: 2024-04-02

## 2024-01-10 RX ADMIN — LEVETIRACETAM 3500 MG: 100 INJECTION, SOLUTION INTRAVENOUS at 18:36

## 2024-01-10 ASSESSMENT — ACTIVITIES OF DAILY LIVING (ADL)
ADLS_ACUITY_SCORE: 35
ADLS_ACUITY_SCORE: 35

## 2024-01-10 NOTE — CONSULTS
[facility]    Patient Name:  Mili Mane  MRN:  [id number]    :  1965  Date of Service:  January 10, 2024  Primary care provider:  Leanna Floyd      Neurology consultation service was asked to see Mili Mane by Dr. Reyes to evaluate breakthrough seizure.    Chief Complaint:  Breakthrough seizure    History of Present Illness:   Mili Mane is a 58 year old woman with history of medically refractory epilepsy (b/l temporal epileptiform activities) s/p vagal nerve stimulator who presents from her assisted living facility due to concerns for breakthrough seizure. She is accompanied by her niece.      Per ED provider, patient was brought in from her assisted living facility as they were concerned she had a generalized tonic-clonic seizure lasting 2 minutes this afternoon. They were unaware that the patient typically experiences these types of seizures and sent her to the ED.  Niece feels that this was an overcall by staff at the assisted living facility who are just getting to know her, as the patient just moved into the [facility] earlier this week.  Patient was previously living at home with her dad as well as her sister (the niece's mom).  She states that the patient last had a generalized tonic-clonic seizure in December when they were going grocery shopping and states that it is not uncommon for the patient to experience generalized tonic-clonic seizures (this varies slightly from patient's epileptologist Dr. Hernandez's last clinic note 23, where Dr. Hernandez noted that her last GTC was in ).     Patient has not experienced any recent infectious symptoms. She felt a little sleepy and groggy after her seizure earlier this afternoon (as she typically does) but has been clearing appropriately and feel back to baseline. Did not bite her tongue. No bladder or bowel incontinence.    At baseline, the patient has a high  seizure burden frequency, experiencing seizures 5-10 times daily.  She is being worked up for neuromodulation outpatient with consideration for deep brain stimulation versus RNS device.     Seizure semiology:   Type 1: staring spells with aura - occurs more frequently than Type 2 semiology  Type 2: GTC's     Patient states she has been taking her medications daily; however, keppra level was found to be low.     ROS  A comprehensive ROS was performed and pertinent findings were included in HPI.     PMH  Past Medical History:   Diagnosis Date    Adjustment disorder with depressed mood     Epileptic seizure (H)     HTN (hypertension)      Past Surgical History:   Procedure Laterality Date    REPLACE GENERATOR STIMULATOR VAGUS NERVE Left 6/20/2017    Procedure: REPLACE GENERATOR STIMULATOR VAGUS NERVE;  Vagus Nerve Stimulator Replacement  ;  Surgeon: Eliseo Nolen MD;  Location: [facility] OR    vnp         Medications   I have personally reviewed the patient's medication list.     Allergies  I have personally reviewed the patient's allergy list.     Social History  -No history of smoking tobacco   -No alcohol use    Family History    -No known family history of seizure    Physical Examination   Vitals: BP (!) 177/80   Pulse 77   Temp 98.1  F (36.7  C) (Oral)   Resp 16   SpO2 99%   General: Lying in bed, NAD  Head: Normocephalic/atraumatic  Eyes: no icterus  Cardiac: Regular rate per vitals  Pulmonary: non-labored, breathing comfortably on room air  GI: non-distended  Skin: No rash or lesion on exposed skin  Extremities: Extremities warm, no edema   Psych: Mood pleasant, affect congruent    Neuro:  Mental status: Awake, alert, attentive, oriented to self, time, place. Converses appropriately  Cranial nerves: PERRL - 2mm, conjugate gaze, EOMI, facial sensation intact to light touch, Visual fields intact, face symmetric, shoulder shrug strong, tongue midline, no dysarthria.   Motor: Normal bulk and tone. No abnormal  "movements. 5/5 in upper and lower extremities except 4+ / 5 strength bilateral hip flexors  Reflexes: Normo-reflexic and symmetric biceps, brachioradialis, patellae, and achilles. No clonus  Sensory: Intact to light touch in proximal and distal aspects of all 4 extremities   Coordination: FNF and HS without ataxia or dysmetria.   Gait: Deferred       Investigations   I have personally reviewed pertinent labs, tests, and radiological imaging. Discussion of notable findings is included under Impression.     CBC / CMP: unremarkable, Na wnl's  UA: unremarkable  Keppra level 6.2  Lactic acid: 0.8    Was patient transferred from outside hospital?   No    Impression  Mili Mane is a 58 year old woman with history of medically refractory epilepsy (b/l temporal epileptiform activities) s/p vagal nerve stimulator who presents from her assisted living facility due to concerns for a larger breakthrough seizure. Niece provides helpful history that patient's GTC seizure earlier this afternoon is not out of the ordinary; it's possible that her assisted living facility just had a low threshold of sending her to the hospital as they are just getting to know the patient's baseline. However, patient's keppra level was found to be subtherapeutic, possibly contributing to breakthrough of her \"larger\" seizure semiology (i.e. GTC).     It's reasonable for the patient to have a seizure action plan, especially given her recent move to an assisted living facility. Will defer creation of  this plan to patient's outpatient epileptologist, due to her complex history.    Recommendations  -Load 3.5g keppra now   -Obtain carbamazepine level (ordered for you); level to be followed up outpatient   -Continue prior to admission epilepsy medications   -Fycompa 4mg daily   -Keppra 1500 mg BID    -Carbamazepine 400mg BID    -Ativan 0.5 mg every evening   -Will defer creation of a seizure action plan to patient's outpatient provider, Dr." David  -Follow up with  as scheduled (2/20/24)  -Patient does not drive at baseline  -Ok to discharge home from neurology perspective     Thank you for involving Neurology in the care of Mili Mane.  Please do not hesitate to call with questions/concerns (consult pager [phone]).      Patient was seen and discussed with Dr. Watt.    Susy Machado MD  PGY-3 Neurology Resident

## 2024-01-10 NOTE — ED TRIAGE NOTES
Per medics: Pt BIBA for a 2 min grand mal seizure witnessed by other residents. Pt has a hx of seizures. She takes keppra for it. According to her she gets about 10 seizures per month in spite of her medications. Vital signs were within normal limits per medics. A & O x4. Pt just moved to senior housing and staff was insistent that she come to the ER.   Pt has an 18G in LAC. Blood sugar 122

## 2024-01-10 NOTE — TELEPHONE ENCOUNTER
LCV:11/21/2023  [facility]  NCV:2-20-24  AED 11-21-23  CBC 11-21-23  NA 11-13-23      PREVIOUS DRUG OVERRIDE  Warnings Override History for CARBATROL 200 MG 12 hr capsule [[id number]]    Overridden by Padmini Redmond PA on Aug 4, 2023 3:13 PM  Allergy/Contraindication  1. PHENOBARBITAL [Level: Cross-sensitive Class Match] [Reason: Tolerated medication/side effects in past]  2. PHENYTOIN [Level: Cross-sensitive Class Match] [Reason: Tolerated medication/side effects in past]  3. PRIMIDONE [Level: Cross-sensitive Class Match] [Reason: Tolerated medication/side effects in past]    Filling per [facility] medication refill protocols - seizure medications.  Not all labs required.

## 2024-01-10 NOTE — ED PROVIDER NOTES
[facility] ([facility])    1/10/24       ED PROVIDER NOTE   [address]  History     Chief Complaint   Patient presents with    Seizures     The history is provided by the patient and medical records.     Mili Mane is a 58 year old female with history of intractable epilepsy on Keppra who presents to the Emergency Department via EMS after experiencing a 2 minute grand mal seizure. This was witnessed by other residents. Patient just moved to senior housing, and staff was insistent that she come to the ER. Vital signs were within normal limits per medics. A & O x4. Blood sugar 122. According to her she gets about 10 seizures per month in spite of her medications. Her seizures usually are absence type where she stares off, but occasionally gets tonic clonic seizures with diffuse shaking. Endorses headache on arrival but now states that she is symptom-free.  No visual changes, motor weakness, or sensory deficit.  She last saw her neurologist, Dr. Guadalupe Hernandez on 11/21/2023.  At that time, she was started on Fycompa.  She says she has been taking all of her medications as prescribed.      Past Medical History  Past Medical History:   Diagnosis Date    Adjustment disorder with depressed mood     Epileptic seizure (H)     HTN (hypertension)      Past Surgical History:   Procedure Laterality Date    REPLACE GENERATOR STIMULATOR VAGUS NERVE Left 6/20/2017    Procedure: REPLACE GENERATOR STIMULATOR VAGUS NERVE;  Vagus Nerve Stimulator Replacement  ;  Surgeon: Eliseo Nolen MD;  Location: [facility] OR    [facility]       atenolol (TENORMIN) 25 MG tablet  atenolol (TENORMIN) 50 MG tablet  CARBATROL 200 MG 12 hr capsule  levETIRAcetam (KEPPRA) 500 MG tablet  LORazepam (ATIVAN) 0.5 MG tablet  medical cannabis (Patient's own supply)  Multiple Vitamins-Minerals (CENTRUM SILVER) per tablet  perampanel (FYCOMPA) 2 MG tablet      Allergies   Allergen Reactions    Methsuximide Anaphylaxis      Anaphylaxis - throat closed and severe rash    Depakote [Valproic Acid]      Tolerated for many years, then started experiencing nausea    Dilantin [Phenytoin]      Possibly caused rash and puffy gums    Fish Allergy     Gabapentin      nausea    Lacosamide      EKG abnormalities    Mysoline [Primidone]      Possibly caused rash    Nuts     Phenobarbital      Possibly caused rash    Pollen Extract/Tree Extract     Topiramate      nausea    Penicillins Rash     Family History  Family History   Problem Relation Age of Onset    Hypertension Mother     Lipids Mother     Cardiovascular Maternal Uncle     Cardiovascular Maternal Uncle     Cardiovascular Paternal Grandfather     Cancer Maternal Grandmother         gastric    Asthma Sister      Social History   Social History     Tobacco Use    Smoking status: Never    Smokeless tobacco: Never   Substance Use Topics    Alcohol use: No     Alcohol/week: 0.0 standard drinks of alcohol    Drug use: No         A medically appropriate review of systems was performed with pertinent positives and negatives noted in the HPI, and all other systems negative.    Physical Exam   BP: (!) 155/80  Pulse: 66  Temp: 98.1  F (36.7  C)  Resp: 16  SpO2: 100 %  Physical Exam  Vitals and nursing note reviewed.   Constitutional:       General: She is not in acute distress.     Appearance: Normal appearance.   HENT:      Head: Normocephalic.      Nose: Nose normal.   Eyes:      Pupils: Pupils are equal, round, and reactive to light.   Cardiovascular:      Rate and Rhythm: Normal rate and regular rhythm.   Pulmonary:      Effort: Pulmonary effort is normal.   Abdominal:      General: There is no distension.   Musculoskeletal:         General: No deformity. Normal range of motion.      Cervical back: Normal range of motion.   Skin:     General: Skin is warm.   Neurological:      General: No focal deficit present.      Mental Status: She is alert and oriented to person, place, and time.      Cranial  Nerves: No cranial nerve deficit.      Sensory: No sensory deficit.      Motor: No weakness.      Coordination: Coordination normal.      Gait: Gait normal.   Psychiatric:         Mood and Affect: Mood normal.         ED Course, Procedures, & Data         Results for orders placed or performed during the hospital encounter of 01/10/24   Comprehensive metabolic panel     Status: Abnormal   Result Value Ref Range    Sodium 138 135 - 145 mmol/L    Potassium 4.3 3.4 - 5.3 mmol/L    Carbon Dioxide (CO2) 28 22 - 29 mmol/L    Anion Gap 9 7 - 15 mmol/L    Urea Nitrogen 13.0 8.0 - 23.0 mg/dL    Creatinine 0.73 0.51 - 0.95 mg/dL    GFR Estimate >90 >60 mL/min/1.73m2    Calcium 9.8 8.6 - 10.0 mg/dL    Chloride 101 98 - 107 mmol/L    Glucose 110 (H) 70 - 99 mg/dL    Alkaline Phosphatase 108 40 - 150 U/L    AST 22 0 - 45 U/L    ALT 14 0 - 50 U/L    Protein Total 7.4 6.4 - 8.3 g/dL    Albumin 4.4 3.5 - 5.2 g/dL    Bilirubin Total 0.2 <=1.2 mg/dL   Keppra (Levetiracetam) Level     Status: Abnormal   Result Value Ref Range    Keppra (Levetiracetam) Level 6.2 (L) 10.0 - 40.0  g/mL   UA with Microscopic reflex to Culture     Status: Abnormal    Specimen: Urine, NOS   Result Value Ref Range    Color Urine Straw Colorless, Straw, Light Yellow, Yellow    Appearance Urine Clear Clear    Glucose Urine Negative Negative mg/dL    Bilirubin Urine Negative Negative    Ketones Urine Negative Negative mg/dL    Specific Gravity Urine 1.008 1.003 - 1.035    Blood Urine Negative Negative    pH Urine 7.0 5.0 - 7.0    Protein Albumin Urine Negative Negative mg/dL    Urobilinogen Urine Normal Normal, 2.0 mg/dL    Nitrite Urine Negative Negative    Leukocyte Esterase Urine Negative Negative    Bacteria Urine Few (A) None Seen /HPF    RBC Urine 0 <=2 /HPF    WBC Urine 1 <=5 /HPF    Squamous Epithelials Urine <1 <=1 /HPF    Narrative    Urine Culture not indicated   Lactic acid whole blood     Status: Normal   Result Value Ref Range    Lactic Acid 0.8  0.7 - 2.0 mmol/L   CBC with platelets and differential     Status: None   Result Value Ref Range    WBC Count 5.6 4.0 - 11.0 10e3/uL    RBC Count 3.88 3.80 - 5.20 10e6/uL    Hemoglobin 11.7 11.7 - 15.7 g/dL    Hematocrit 35.7 35.0 - 47.0 %    MCV 92 78 - 100 fL    MCH 30.2 26.5 - 33.0 pg    MCHC 32.8 31.5 - 36.5 g/dL    RDW 12.5 10.0 - 15.0 %    Platelet Count 301 150 - 450 10e3/uL    % Neutrophils 66 %    % Lymphocytes 24 %    % Monocytes 7 %    % Eosinophils 2 %    % Basophils 1 %    % Immature Granulocytes 0 %    NRBCs per 100 WBC 0 <1 /100    Absolute Neutrophils 3.7 1.6 - 8.3 10e3/uL    Absolute Lymphocytes 1.3 0.8 - 5.3 10e3/uL    Absolute Monocytes 0.4 0.0 - 1.3 10e3/uL    Absolute Eosinophils 0.1 0.0 - 0.7 10e3/uL    Absolute Basophils 0.0 0.0 - 0.2 10e3/uL    Absolute Immature Granulocytes 0.0 <=0.4 10e3/uL    Absolute NRBCs 0.0 10e3/uL   CBC with platelets differential     Status: None    Narrative    The following orders were created for panel order CBC with platelets differential.  Procedure                               Abnormality         Status                     ---------                               -----------         ------                     CBC with platelets and d...[[id number]]                      Final result                 Please view results for these tests on the individual orders.     Medications   pharmacy alert - intermittent dosing (has no administration in time range)   levETIRAcetam (KEPPRA) 3,500 mg in sodium chloride 0.9 % 250 mL intermittent infusion (0 mg Intravenous Stopped 1/10/24 8277)     Labs Ordered and Resulted from Time of ED Arrival to Time of ED Departure   COMPREHENSIVE METABOLIC PANEL - Abnormal       Result Value    Sodium 138      Potassium 4.3      Carbon Dioxide (CO2) 28      Anion Gap 9      Urea Nitrogen 13.0      Creatinine 0.73      GFR Estimate >90      Calcium 9.8      Chloride 101      Glucose 110 (*)     Alkaline Phosphatase 108      AST 22      ALT 14       Protein Total 7.4      Albumin 4.4      Bilirubin Total 0.2     KEPPRA (LEVETIRACETAM) LEVEL - Abnormal    Keppra (Levetiracetam) Level 6.2 (*)    ROUTINE UA WITH MICROSCOPIC REFLEX TO CULTURE - Abnormal    Color Urine Straw      Appearance Urine Clear      Glucose Urine Negative      Bilirubin Urine Negative      Ketones Urine Negative      Specific Gravity Urine 1.008      Blood Urine Negative      pH Urine 7.0      Protein Albumin Urine Negative      Urobilinogen Urine Normal      Nitrite Urine Negative      Leukocyte Esterase Urine Negative      Bacteria Urine Few (*)     RBC Urine 0      WBC Urine 1      Squamous Epithelials Urine <1     LACTIC ACID WHOLE BLOOD - Normal    Lactic Acid 0.8     CBC WITH PLATELETS AND DIFFERENTIAL    WBC Count 5.6      RBC Count 3.88      Hemoglobin 11.7      Hematocrit 35.7      MCV 92      MCH 30.2      MCHC 32.8      RDW 12.5      Platelet Count 301      % Neutrophils 66      % Lymphocytes 24      % Monocytes 7      % Eosinophils 2      % Basophils 1      % Immature Granulocytes 0      NRBCs per 100 WBC 0      Absolute Neutrophils 3.7      Absolute Lymphocytes 1.3      Absolute Monocytes 0.4      Absolute Eosinophils 0.1      Absolute Basophils 0.0      Absolute Immature Granulocytes 0.0      Absolute NRBCs 0.0     CARBAMAZEPINE EPOXIDE AND TOTAL     No orders to display          Critical care was not performed.     Medical Decision Making  The patient's presentation was of high complexity (a chronic illness severe exacerbation, progression, or side effect of treatment).    The patient's evaluation involved:  review of external note(s) from 1 sources (neuro note, see HPI)  ordering and/or review of 3+ test(s) in this encounter (see separate area of note for details)  discussion of management or test interpretation with another health professional (neuro consult)    The patient's management necessitated moderate risk (prescription drug management including medications given  "in the ED).    Assessment & Plan    Patient presents to the ED with concern for breakthrough seizure.  She reportedly has approximately 10 small seizures per month despite antiepileptic medications.  She will intermittently have \"larger\" seizures that involve tonic-clonic activity but she states she does not have overload several years.  Continue living staff reported tonic-clonic activity in her seizure today which is what prompted her ED visit.    On arrival, patient is vital signs.  Overall appears well and nontoxic.  She has a nonfocal neuroexam.  Family feels that she is at her baseline.    Labs ordered/reviewed.  Lactic acid is normal at 0.8 and a against large tonic-clonic seizure.  Metabolic panel shows normal electrolytes normal renal function.  CBC without leukocytosis or anemia.  Keppra level is significantly subtherapeutic at 6.2.  Urinalysis without evidence of infection.    Discussed with the neurology team, see their note for supporting documentation.  Given the subtherapeutic Keppra level, recommend IV load in the ED of 3.5 g.  Patient can then continue her normal medication regimen.  Will need follow-up with her neurologist for repeat Keppra level and to discuss seizure planning at home so that patient and facility staff know when to return to the ED.    Findings and plan of care discussed with patient and her niece at bedside.    I have reviewed the nursing notes. I have reviewed the findings, diagnosis, plan and need for follow up with the patient.    Discharge Medication List as of 1/10/2024  6:58 PM          Final diagnoses:   Seizure (H)       Guido Reyes DO    [facility] EMERGENCY DEPARTMENT  1/10/2024     Guido Reyes DO  01/10/24 1920    "

## 2024-01-11 ENCOUNTER — DOCUMENTATION ONLY (OUTPATIENT)
Dept: OTHER | Facility: CLINIC | Age: 59
End: 2024-01-11
Payer: COMMERCIAL

## 2024-01-11 PROBLEM — Z71.89 ADVANCED DIRECTIVES, COUNSELING/DISCUSSION: Chronic | Status: RESOLVED | Noted: 2017-10-25 | Resolved: 2024-01-11

## 2024-01-11 NOTE — DISCHARGE INSTRUCTIONS
You were evaluated by the emergency department and neurology staff.  Your laboratory workup is reassuring.  Your keppra level was low so you were given Keppra (3.5g) though your IV.  It is important that you call your neurologist tomorrow to schedule follow up appointment.  Return to the ED with any new or worsening symptoms.

## 2024-01-12 ENCOUNTER — OFFICE VISIT (OUTPATIENT)
Dept: NEUROLOGY | Facility: CLINIC | Age: 59
End: 2024-01-12
Payer: COMMERCIAL

## 2024-01-12 VITALS
SYSTOLIC BLOOD PRESSURE: 140 MMHG | WEIGHT: 135.2 LBS | TEMPERATURE: 97.2 F | BODY MASS INDEX: 24.73 KG/M2 | HEART RATE: 73 BPM | DIASTOLIC BLOOD PRESSURE: 81 MMHG

## 2024-01-12 DIAGNOSIS — G40.219 PARTIAL SYMPTOMATIC EPILEPSY WITH COMPLEX PARTIAL SEIZURES, INTRACTABLE, WITHOUT STATUS EPILEPTICUS (H): Primary | ICD-10-CM

## 2024-01-12 NOTE — PATIENT INSTRUCTIONS
Continue current dosing of medications    Seizure protocols and first aid information were provided today    Keep your follow up as scheduled with Dr. Hernandez in February, at which time you can further discuss surgical interventions. Call sooner with questions, concerns, or worsening of symptoms.

## 2024-01-12 NOTE — PROGRESS NOTES
[facility] VNS Progress Note    VNS Management (VNS Flowsheet)      8/16/2023     1:00 PM 1/12/2024     3:00 PM   Vagus Nerve Stimulation   MD David Hernandez   Implant Date 6/20/2017 6/20/2017   Model Number 106 [id number]   Serial Number [id number]   Patient ID [email or web address]   VNS Interrogation  Incoming Parameters   Date 8/16/2023 1/12/2024   Output Current (mA) 1.25 1.25   Signal Frequency (Hz) 20 20   Pulse Width (usec) 250 250   Signal ON Time (sec) 30 30   Signal OFF Time (min) 5 5   Magent Output Current (mA) 1.5 1.5   Magent ON Time (sec) 60 60   Magnet Pulse Width (usec) 250 250   AutoStim Output Current (mA) 1.25 1.25   AutoStim Pulse Width (usec) 250 250   AutoStim ON Time (sec) 30 30   Tachycardia Detection ON/OFF on ON   Heartbeat Detection Sensitivity (1-5) 2 2   Perform Verify Heartbeat Detection (y/n) No No   Threshold for AutoStim (%) 40 40%   Output Current (OK/Low) OK OK   Current Delivered (mA) 1.25 1.25   Impedance Value (Ohms) 2296 2203   Battery Status Indicator (Green/Yellow/Red, %) Green, 25%-50% green 25-50%   IFI (No/Yes)  No   Number of Magnet Swipes  580 lifetime total   Average AutoStims per day 32.48 36.3   # of days since last visit  149

## 2024-01-12 NOTE — LETTER
1/12/2024       RE: Mili CASTANEDA Alhaji  [address]  [place] [address]      SEIZURE PLAN AND PROTOCOL    Plan of Care:    Follow general seizure care and First Aid guidelines for seizures.  Anticonvulsant medications will be administered as prescribed.  All seizures will be documented on a Seizure Report including:  date, time, length of seizure, specific body movements and behaviors exhibited during the seizure, and post-seizure state.  Antiepileptic blood levels will be ordered by the physician based upon client's condition and medications.    Missed Doses:    IF YOU REALIZE WITHIN 24 HOURS:    ...You MISSED ONE DOSE of your anticonvulsant medication(s), take the missed dose immediately unless it is time for your next scheduled dose. If that is the case, take your next scheduled dose, wait two hours, and then take the missed dose.    ...You MISSED TWO OR MORE DOSES, take one of the missed doses immediately, even if it is time for your next scheduled dose. Then call the [facility] clinic to schedule an appointment.     IF YOU REALIZE NOW YOU MISSED A DOSE MORE THAN 24 HOURS AGO, makeda it on your calendar. DO NOT take an extra dose.    Medication Errors:    Your facility nurse should be notified of any medication errors. The nurse should observe the urgency of the error. It is not necessary to notify the MD on call unless the facility nurse or delegate believes it to be life threatening or could possibly result in a serious complication. Routine notices required by regulation should be telephoned to the clinic during office hours.    Extra Dose:    An extra dose of an antiepileptic drug is not serious. Ordinarily, at most, the client may experience increased side effects for several hours. Contact your facility nurse immediately if an extra dose was given.    Seizure Protocol:    Vagus nerve stimulator in place. In the event of a seizure, activate an additional stimulation by holding a magnet over the  area of the generator for at least 1 second, then removing the magnet. Repeat this after 2 minutes if needed.     VNS Model: M106  Serial Number: [id number]    When to call 911 for Seizures:    Call 911 if Mili:    ... does not start breathing within 1 minute after the seizure. If this happens call 911 immediately and start CPR.    ...sustains an injury    ... has one seizure right after another without regaining consciousness in between or a convulsive seizure that lasts over 5 minutes    ... requests an ambulance once back to baseline    Or facility protocol requires activation of emergency medical services        Provider:              Guadalupe LIU

## 2024-01-12 NOTE — PROGRESS NOTES
"[facility] Epilepsy Care Progress Note      Patient:  Mili Mane  :  1965   Age:  58 year old   Today's Office Visit:  2024  Chief Complaint: Follow up seizures    Epilepsy Data:  Her seizure started at age 18 months, early on she had \"petit mal\" which continued to age 11. When she was born she had \" some oxygen issues at birth, then as infant she had recurrent fever\". Early childhood she was on phenobarbital, phenytoin, mysoline and she continued to have starting spells several time per week. It seems over the years she has tried several seizure medications but continues to have high seizure burden. Her recurrent spells seem consistent with focal impaired seizures. She followed with Dr. Kendall for 33 years.     Seizure types:   Seizure type 1: focal seizures with impairment in awareness  - Aura of ringing in ear, stares, last 30 seconds, started 18 months. Per Patricia she has a \"blank stare for minutes\". No automatisms. She calls these \"small\" she describes them as \"I have loss of awareness, I do not get hurt, and I do not fall.   Seizure type 2: generalized tonic-clonic convulsion - whole body stiffening and shaking, she bites her cheek. Last \"hard seizure\" was 2021. In 2021 she was walking her dog, she fell and was confused.   Seizure type 3: She is aware it is coming on from a vague sound. Crouches down and has head jerking to the right side. She has had multiple of these over the last few months. She states she has had this in the shower before. She has impaired consciousness. There can be loss of bladder control, and biting of the cheek or tongue, as well as drooling. They can last five minutes in duration. She can be groggy and confused for around an hour.         History of Present Illness:   Ms. Mane was last seen by Dr. Hernandez on 2023. At the time of the patient's last visit, she had medically refractory focal epilepsy. She was to continue levetiracetam 1500mg " twice per day, Carbatrol 400mg twice per day, Cobalt oral suspension 1200 mg of CBD and 60 mg THC, 3 mL twice per day, Tangerine oral suspension, which is  mg and traces of CBD 3 mL daily at noon, and Lorazepam 0.5 mg night (she was suppose to take it prn, but, she takes it every night). She was to start Fycompa, working up to 4mg per day. She was to continue with talk therapy. 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 Follow up during inpatient video EEG admission was recommended in two months.    In the interim, the patient was seen in the emergency department on 1/10/2024 for a tonic clonic seizure lasting two minutes at the request of her new living facility. She was loaded with Keppra then to resume her normal dosing.    Today, Ms. Mane presents with her niece who contributes to the history. She moved into an Assisted Living Facility last Friday. She had a tonic clonic seizure on 1/10/2023 and her living facility is not familiar with seizures, so an ambulance was called and she was brought to the ED. They are hoping to have a seizure protocol with more direction for them as the primary reason for the visit today.    She continues to average 10 to 15 seizures per month. The staring spells are lasting longer and takes her longer to come out of. She has more confusion especially when she is someplace unfamiliar. She feels she has less awareness than before. About half of the seizures are staring spells and half are generalized tonic clonic seizures. She can fall to the ground, losing muscle tone prior to generalized tonic clonic seizures.    With the seizure occurring on 1/10, she states she did miss her Carbatrol in the morning. She does not believe she missed her Keppra. She took her medical cannabis but everything else was late that day as far as dosing. She had just moved into the assisted living so there was  increased stress. She feels she is sleeping better since moving. The day prior was also stressful.     She feels she may be slightly more groggy on the Fycompa. She is being protective with daily activities, such as using elevators instead of stairs. There has been no change in seizure frequency with this yet, not quite three months out.     She denies any new medications or new diagnoses.     She is managing her own medications still. The assisted living is providing meals and cleaning.      We spent some time discussing the steps for surgical evaluation, referencing Dr. Hernandez's office visit from 1/2/2023. They are interested in discussing this further with Dr. Hernandez at her upcoming visit before scheduling anything.      Current Outpatient Medications   Medication Sig Dispense Refill    atenolol (TENORMIN) 25 MG tablet Take 25 mg by mouth every evening      atenolol (TENORMIN) 50 MG tablet Take 50 mg by mouth every morning      CARBATROL 200 MG 12 hr capsule TAKE TWO CAPSULES BY MOUTH EVERY MORNING AND TAKE TWO CAPSULES BY MOUTH EVERY EVENING 360 capsule 0    levETIRAcetam (KEPPRA) 500 MG tablet TAKE THREE TABLETS  (1500 MG)  BY MOUTH TWICE A  tablet 3    LORazepam (ATIVAN) 0.5 MG tablet TAKE ONE TABLET BY MOUTH EVERY EVENING 90 tablet 1    medical cannabis (Patient's own supply) See Admin Instructions (The purpose of this order is to document that the patient reports taking medical cannabis.  This is not a prescription, and is not used to certify that the patient has a qualifying medical condition.)      Multiple Vitamins-Minerals (CENTRUM SILVER) per tablet Take 1 tablet by mouth daily      perampanel (FYCOMPA) 2 MG tablet Week 1: 1 tablet daily. Week 2 - onward: 2 tablets daily 60 tablet 5        Perceived AED Side Effects:  Yes    Medication Notes:        AED Medication Compliance:  compliant most of the time  Using a pill box:  Yes    Diagnostic studies reviewed:   Latest Reference Range & Units Most  Recent   Sodium 135 - 145 mmol/L 138  1/10/24 16:39   Potassium 3.4 - 5.3 mmol/L 4.3  1/10/24 16:39   Chloride 98 - 107 mmol/L 101  1/10/24 16:39   Carbon Dioxide (CO2) 22 - 29 mmol/L 28  1/10/24 16:39   Urea Nitrogen 8.0 - 23.0 mg/dL 13.0  1/10/24 16:39   Creatinine 0.51 - 0.95 mg/dL 0.73  1/10/24 16:39   GFR Estimate >60 mL/min/1.73m2 >90  1/10/24 16:39   Calcium 8.6 - 10.0 mg/dL 9.8  1/10/24 16:39   Anion Gap 7 - 15 mmol/L 9  1/10/24 16:39   Albumin 3.5 - 5.2 g/dL 4.4  1/10/24 16:39   Protein Total 6.4 - 8.3 g/dL 7.4  1/10/24 16:39   Alkaline Phosphatase 40 - 150 U/L 108  1/10/24 16:39   ALT 0 - 50 U/L 14  1/10/24 16:39   AST 0 - 45 U/L 22  1/10/24 16:39      Latest Reference Range & Units Most Recent   WBC 4.0 - 11.0 10e3/uL 5.6  1/10/24 16:39   Hemoglobin 11.7 - 15.7 g/dL 11.7  1/10/24 16:39   Hematocrit 35.0 - 47.0 % 35.7  1/10/24 16:39   Platelet Count 150 - 450 10e3/uL 301  1/10/24 16:39   RBC Count 3.80 - 5.20 10e6/uL 3.88  1/10/24 16:39   MCV 78 - 100 fL 92  1/10/24 16:39   MCH 26.5 - 33.0 pg 30.2  1/10/24 16:39   MCHC 31.5 - 36.5 g/dL 32.8  1/10/24 16:39   RDW 10.0 - 15.0 % 12.5  1/10/24 16:39      Latest Reference Range & Units Most Recent 06/30/22 15:43 11/21/23 11:40   Carbamazepine Total Level 4.0 - 12.0 ug/mL 5.2  11/21/23 11:40 5.5 5.2   10, 11 Epoxide Level ug/mL 1.8  11/21/23 11:40 1.9 1.8   Keppra (Levetiracetam) Level 10.0 - 40.0  g/mL 6.2 (L)  1/10/24 16:39 22.5 48.6 (H)       Review of Systems:  Lethargy / Tiredness:  No  Nausea / Vomiting:  No  Double Vision:  No  Sleepiness:  No  Depression:  working with a counselor for anxiety and depression. She is considering changing counselors. Her niece feels her mood has improved with her mood.  Memory Problems:  for the most part pretty good, stress dependent  Poor Balance:  Yes - can be a precursor to seizures  Dizziness:  No  Blurred Vision:  No, but sensitive to bright light and feels there is a yellow covering over her eyes. She has not had eye  "exam in around 7 years, planning to schedule.  Sleep Changes:  sleeping better with the move    Other Issues:    Is patient safe to drive:  No     Exam:    BP (!) 140/81 (BP Location: Right arm, Patient Position: Sitting, Cuff Size: Adult Large)   Pulse 73   Temp 97.2  F (36.2  C) (Temporal)   Wt 135 lb 3.2 oz (61.3 kg)   BMI 24.73 kg/m       Wt Readings from Last 5 Encounters:   01/12/24 135 lb 3.2 oz (61.3 kg)   11/21/23 132 lb 3.2 oz (60 kg)   04/04/23 130 lb (59 kg)   01/02/23 146 lb (66.2 kg)   06/30/22 146 lb 6.4 oz (66.4 kg)       General: General examination reveals a well developed female in no acute distress    Neurological Examination:    Mental Status: Alert and awake. Mood and affect were appropriate to situation. Memory appeared intact, not formally tested. Language with slight tremulousness in the setting of VNS  Cranial Nerves:  II-XII grossly intact.  Motor: Moves all four extremities spontaneously and against gravity.  Coordination: No rest tremor  Station and Gait: Able to rise from a seated position independently. Ambulates without an assistive device.    Assessment and Plan:   Ms. Mane continues to have the same seizure frequency at this time in the setting of medically refractory focal epilepsy. She has some fatigue with Fycompa, not yet on this for three months. She requires a seizure protocol with a recent move into an Assisted Living Facility, which was provided today.  Her VNS was interrogated, with 25-50% battery life.    A description was provided of a different seizure type which appears to represent a focal impaired seizure that I do not see we had documented. There is mention of a \"moderate seizure\" which this may represent, and is not new to her.    No changes in her medications have been recommended at this time. She is encouraged to continue working with mental health. They are interested in surgical intervention, planning to discuss this further with Dr. Hernandez at their next " visit. They declined any orders being placed today for this.    Ms. Mane will follow up with Dr. Hernandez as scheduled in February. If they have any questions, concerns, or worsening symptoms in the interim, they were encouraged the contact the clinic.    Thank you for letting me participate in your patient's care.      As described above, I met with the patient for 40 minutes and during this time counseling was greater than 50% of the visit time.

## 2024-01-12 NOTE — LETTER
"2024       RE: Mili Mane  : 1965   MRN: [id number]        Dear Colleague,    Thank you for referring your patient, Mili Mane, to the [facility] EPILEPSY CARE at [facility]. Please see a copy of my visit note below.    [facility] Epilepsy Care Progress Note      Patient:  Mili Mane  :  1965   Age:  58 year old   Today's Office Visit:  2024  Chief Complaint: Follow up seizures    Epilepsy Data:  Her seizure started at age 18 months, early on she had \"petit mal\" which continued to age 11. When she was born she had \" some oxygen issues at birth, then as infant she had recurrent fever\". Early childhood she was on phenobarbital, phenytoin, mysoline and she continued to have starting spells several time per week. It seems over the years she has tried several seizure medications but continues to have high seizure burden. Her recurrent spells seem consistent with focal impaired seizures. She followed with Dr. Kendall for 33 years.     Seizure types:   Seizure type 1: focal seizures with impairment in awareness  - Aura of ringing in ear, stares, last 30 seconds, started 18 months. Per Patricia she has a \"blank stare for minutes\". No automatisms. She calls these \"small\" she describes them as \"I have loss of awareness, I do not get hurt, and I do not fall.   Seizure type 2: generalized tonic-clonic convulsion - whole body stiffening and shaking, she bites her cheek. Last \"hard seizure\" was 2021. In 2021 she was walking her dog, she fell and was confused.   Seizure type 3: She is aware it is coming on from a vague sound. Crouches down and has head jerking to the right side. She has had multiple of these over the last few months. She states she has had this in the shower before. She has impaired consciousness. There can be loss of bladder control, and biting of the cheek or tongue, as well as drooling. They " can last five minutes in duration. She can be groggy and confused for around an hour.         History of Present Illness:   Ms. Mane was last seen by Dr. Hernandez on 11/21/2023. At the time of the patient's last visit, she had medically refractory focal epilepsy. She was to continue levetiracetam 1500mg twice per day, Carbatrol 400mg twice per day, Cobalt oral suspension 1200 mg of CBD and 60 mg THC, 3 mL twice per day, Tangerine oral suspension, which is  mg and traces of CBD 3 mL daily at noon, and Lorazepam 0.5 mg night (she was suppose to take it prn, but, she takes it every night). She was to start Fycompa, working up to 4mg per day. She was to continue with talk therapy. 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 Follow up during inpatient video EEG admission was recommended in two months.    In the interim, the patient was seen in the emergency department on 1/10/2024 for a tonic clonic seizure lasting two minutes at the request of her new living facility. She was loaded with Keppra then to resume her normal dosing.    Today, Ms. Mane presents with her niece who contributes to the history. She moved into an Assisted Living Facility last Friday. She had a tonic clonic seizure on 1/10/2023 and her living facility is not familiar with seizures, so an ambulance was called and she was brought to the ED. They are hoping to have a seizure protocol with more direction for them as the primary reason for the visit today.    She continues to average 10 to 15 seizures per month. The staring spells are lasting longer and takes her longer to come out of. She has more confusion especially when she is someplace unfamiliar. She feels she has less awareness than before. About half of the seizures are staring spells and half are generalized tonic clonic seizures. She can fall to the ground, losing muscle tone prior to generalized  tonic clonic seizures.    With the seizure occurring on 1/10, she states she did miss her Carbatrol in the morning. She does not believe she missed her Keppra. She took her medical cannabis but everything else was late that day as far as dosing. She had just moved into the assisted living so there was increased stress. She feels she is sleeping better since moving. The day prior was also stressful.     She feels she may be slightly more groggy on the Fycompa. She is being protective with daily activities, such as using elevators instead of stairs. There has been no change in seizure frequency with this yet, not quite three months out.     She denies any new medications or new diagnoses.     She is managing her own medications still. The assisted living is providing meals and cleaning.      We spent some time discussing the steps for surgical evaluation, referencing Dr. Hernandez's office visit from 1/2/2023. They are interested in discussing this further with Dr. Hernandez at her upcoming visit before scheduling anything.      Current Outpatient Medications   Medication Sig Dispense Refill    atenolol (TENORMIN) 25 MG tablet Take 25 mg by mouth every evening      atenolol (TENORMIN) 50 MG tablet Take 50 mg by mouth every morning      CARBATROL 200 MG 12 hr capsule TAKE TWO CAPSULES BY MOUTH EVERY MORNING AND TAKE TWO CAPSULES BY MOUTH EVERY EVENING 360 capsule 0    levETIRAcetam (KEPPRA) 500 MG tablet TAKE THREE TABLETS  (1500 MG)  BY MOUTH TWICE A  tablet 3    LORazepam (ATIVAN) 0.5 MG tablet TAKE ONE TABLET BY MOUTH EVERY EVENING 90 tablet 1    medical cannabis (Patient's own supply) See Admin Instructions (The purpose of this order is to document that the patient reports taking medical cannabis.  This is not a prescription, and is not used to certify that the patient has a qualifying medical condition.)      Multiple Vitamins-Minerals (CENTRUM SILVER) per tablet Take 1 tablet by mouth daily      perampanel  (FYCOMPA) 2 MG tablet Week 1: 1 tablet daily. Week 2 - onward: 2 tablets daily 60 tablet 5        Perceived AED Side Effects:  Yes    Medication Notes:        AED Medication Compliance:  compliant most of the time  Using a pill box:  Yes    Diagnostic studies reviewed:   Latest Reference Range & Units Most Recent   Sodium 135 - 145 mmol/L 138  1/10/24 16:39   Potassium 3.4 - 5.3 mmol/L 4.3  1/10/24 16:39   Chloride 98 - 107 mmol/L 101  1/10/24 16:39   Carbon Dioxide (CO2) 22 - 29 mmol/L 28  1/10/24 16:39   Urea Nitrogen 8.0 - 23.0 mg/dL 13.0  1/10/24 16:39   Creatinine 0.51 - 0.95 mg/dL 0.73  1/10/24 16:39   GFR Estimate >60 mL/min/1.73m2 >90  1/10/24 16:39   Calcium 8.6 - 10.0 mg/dL 9.8  1/10/24 16:39   Anion Gap 7 - 15 mmol/L 9  1/10/24 16:39   Albumin 3.5 - 5.2 g/dL 4.4  1/10/24 16:39   Protein Total 6.4 - 8.3 g/dL 7.4  1/10/24 16:39   Alkaline Phosphatase 40 - 150 U/L 108  1/10/24 16:39   ALT 0 - 50 U/L 14  1/10/24 16:39   AST 0 - 45 U/L 22  1/10/24 16:39      Latest Reference Range & Units Most Recent   WBC 4.0 - 11.0 10e3/uL 5.6  1/10/24 16:39   Hemoglobin 11.7 - 15.7 g/dL 11.7  1/10/24 16:39   Hematocrit 35.0 - 47.0 % 35.7  1/10/24 16:39   Platelet Count 150 - 450 10e3/uL 301  1/10/24 16:39   RBC Count 3.80 - 5.20 10e6/uL 3.88  1/10/24 16:39   MCV 78 - 100 fL 92  1/10/24 16:39   MCH 26.5 - 33.0 pg 30.2  1/10/24 16:39   MCHC 31.5 - 36.5 g/dL 32.8  1/10/24 16:39   RDW 10.0 - 15.0 % 12.5  1/10/24 16:39      Latest Reference Range & Units Most Recent 06/30/22 15:43 11/21/23 11:40   Carbamazepine Total Level 4.0 - 12.0 ug/mL 5.2  11/21/23 11:40 5.5 5.2   10, 11 Epoxide Level ug/mL 1.8  11/21/23 11:40 1.9 1.8   Keppra (Levetiracetam) Level 10.0 - 40.0  g/mL 6.2 (L)  1/10/24 16:39 22.5 48.6 (H)       Review of Systems:  Lethargy / Tiredness:  No  Nausea / Vomiting:  No  Double Vision:  No  Sleepiness:  No  Depression:  working with a counselor for anxiety and depression. She is considering changing counselors. Her  niece feels her mood has improved with her mood.  Memory Problems:  for the most part pretty good, stress dependent  Poor Balance:  Yes - can be a precursor to seizures  Dizziness:  No  Blurred Vision:  No, but sensitive to bright light and feels there is a yellow covering over her eyes. She has not had eye exam in around 7 years, planning to schedule.  Sleep Changes:  sleeping better with the move    Other Issues:    Is patient safe to drive:  No     Exam:    BP (!) 140/81 (BP Location: Right arm, Patient Position: Sitting, Cuff Size: Adult Large)   Pulse 73   Temp 97.2  F (36.2  C) (Temporal)   Wt 135 lb 3.2 oz (61.3 kg)   BMI 24.73 kg/m       Wt Readings from Last 5 Encounters:   01/12/24 135 lb 3.2 oz (61.3 kg)   11/21/23 132 lb 3.2 oz (60 kg)   04/04/23 130 lb (59 kg)   01/02/23 146 lb (66.2 kg)   06/30/22 146 lb 6.4 oz (66.4 kg)       General: General examination reveals a well developed female in no acute distress    Neurological Examination:    Mental Status: Alert and awake. Mood and affect were appropriate to situation. Memory appeared intact, not formally tested. Language with slight tremulousness in the setting of VNS  Cranial Nerves:  II-XII grossly intact.  Motor: Moves all four extremities spontaneously and against gravity.  Coordination: No rest tremor  Station and Gait: Able to rise from a seated position independently. Ambulates without an assistive device.    Assessment and Plan:   Ms. Mane continues to have the same seizure frequency at this time in the setting of medically refractory focal epilepsy. She has some fatigue with Fycompa, not yet on this for three months. She requires a seizure protocol with a recent move into an Assisted Living Facility, which was provided today.  Her VNS was interrogated, with 25-50% battery life.    A description was provided of a different seizure type which appears to represent a focal impaired seizure that I do not see we had documented. There is  "mention of a \"moderate seizure\" which this may represent, and is not new to her.    No changes in her medications have been recommended at this time. She is encouraged to continue working with mental health. They are interested in surgical intervention, planning to discuss this further with Dr. Hernandez at their next visit. They declined any orders being placed today for this.    Ms. Mane will follow up with Dr. Hernandez as scheduled in February. If they have any questions, concerns, or worsening symptoms in the interim, they were encouraged the contact the clinic.    Thank you for letting me participate in your patient's care.      As described above, I met with the patient for 40 minutes and during this time counseling was greater than 50% of the visit time.                            [facility] VNS Progress Note    VNS Management (VNS Flowsheet)      8/16/2023     1:00 PM 1/12/2024     3:00 PM   Vagus Nerve Stimulation   MD David Hernandez   Implant Date 6/20/2017 6/20/2017   Model Number 106 M106   Serial Number [id number]   Patient ID [email or web address]   VNS Interrogation  Incoming Parameters   Date 8/16/2023 1/12/2024   Output Current (mA) 1.25 1.25   Signal Frequency (Hz) 20 20   Pulse Width (usec) 250 250   Signal ON Time (sec) 30 30   Signal OFF Time (min) 5 5   Magent Output Current (mA) 1.5 1.5   Magent ON Time (sec) 60 60   Magnet Pulse Width (usec) 250 250   AutoStim Output Current (mA) 1.25 1.25   AutoStim Pulse Width (usec) 250 250   AutoStim ON Time (sec) 30 30   Tachycardia Detection ON/OFF on ON   Heartbeat Detection Sensitivity (1-5) 2 2   Perform Verify Heartbeat Detection (y/n) No No   Threshold for AutoStim (%) 40 40%   Output Current (OK/Low) OK OK   Current Delivered (mA) 1.25 1.25   Impedance Value (Ohms) [id number]   Battery Status Indicator (Green/Yellow/Red, %) Green, 25%-50% green 25-50%   IFI (No/Yes)  No   Number of Magnet Swipes  580 lifetime total   Average AutoStims per day 32.48 36.3 "   # of days since last visit  149            Again, thank you for allowing me to participate in the care of your patient.      Sincerely,    Enedelia Boyer PA-C

## 2024-02-20 ENCOUNTER — OFFICE VISIT (OUTPATIENT)
Dept: NEUROLOGY | Facility: CLINIC | Age: 59
End: 2024-02-20
Payer: COMMERCIAL

## 2024-02-20 VITALS
TEMPERATURE: 97.9 F | BODY MASS INDEX: 25.51 KG/M2 | DIASTOLIC BLOOD PRESSURE: 85 MMHG | WEIGHT: 138.6 LBS | HEIGHT: 62 IN | HEART RATE: 76 BPM | SYSTOLIC BLOOD PRESSURE: 152 MMHG

## 2024-02-20 DIAGNOSIS — G40.219 PARTIAL SYMPTOMATIC EPILEPSY WITH COMPLEX PARTIAL SEIZURES, INTRACTABLE, WITHOUT STATUS EPILEPTICUS (H): Primary | ICD-10-CM

## 2024-02-20 RX ORDER — OXCARBAZEPINE 150 MG/1
TABLET, FILM COATED ORAL
Qty: 180 TABLET | Refills: 3 | Status: SHIPPED | OUTPATIENT
Start: 2024-02-20 | End: 2024-05-29

## 2024-02-20 NOTE — PROGRESS NOTES
"[facility] Progress Note    Patient:  Mili Mane  :  1965   Age:  58 year old   Today's Office Visit:  2024      Epilepsy History:  Her seizure started at age 18 months, early on she had \"petit mal\" which continued to age 11.  When she was born she had \" some oxygen issues at birth, then as infant she had recurrent fever\". Early childhood she was on phenobarbital, phenytoin, mysoline and she continued to have starting spells several time per week.  It seems over the years she has tried several seizure medications but continues to have high seizure burden.  Her recurrent spells seem consistent with focal impaired seizures.She followed with Dr. Kendall for 33 years.  Interval history:   she is accompanied today with her friend Batsheva (relative), her sister Patricia was not here, niece Talya is  with her today. Last visit was 2023. She moved to assisted living 2024. She went to er because assisted living placed call EMS when she had focal seizures with impairment in awareness. She had one fall secondary to a seizure, fall 2024. She has 1-2 falls per month. She started fycompa and feels tired on it. Her father passed away 2024. She states \"my stomach is upset, increased acid\". She does not like fatigue on fycompa. Her levetiracetam was low at 6.2 in 2024. She uses pill box, I asked her to check labs every 1-2 months to check on compliance. She is agreeable. Maybe  there were many stressors.    I reviewed the importance of epilepsy surgery and better seizure control and how it impacts multiple domains of your life such as depression, anxiety, memory decline, physical harm from seizures, etc.  Patient expressed understanding of these recommendations and would like to put this on hold for now -  epilepsy surgical evaluation.  Seizure types:   Seizure type 1: focal seizures with impairment in awareness  - Aura of ringing in ear, stares, last 30 seconds, started 18 months. Per Patricia " "she has a \"blank stare for minutes\". No automatisms. She calls these \"small\" she describes them as \"I have loss of awareness, I do not get hurt, and I do not fall. She may have 5 seizure per month.  She describes another type of seizure as \"medium\" seizures in which she has loss of awareness and then when she comes to she is not able to talk for 10-15 minutes.  She had 1 \"medium\" in 1/2024.  Usually seizure are at 7 pm.   Seizure type 2: generalized tonic-clonic convulsion - whole body stiffening and shaking, she bites her cheek, may she had under 10 in her lifetime. Last \"hard seizure\" was 11/2021. In 11/2021 she was walking her dog, she fell and was confused.   Current antiepileptic drugs:   Levetiracetam  (500 mg tablet) - 1500 mg twice a day   Carbatrol (200 mg tablet) 400 mg twice a day   Fycompa 2 mg tablet - takes 2 tablet daily   Cobalt oral suspension 1200 mg of CBD and 60 mg THC.  Patient takes 3 mL b.i.d.  Tangerine oral suspension, which is  mg and traces of CBD 3 mL daily at noon.  Lorazepam 0.5 mg night (she was suppose to take it prn, but, she takes it every night)  Precipitating factors:   Sleep Deprivation, Stress and Failure to take AED  Current Outpatient Medications   Medication Sig Dispense Refill    atenolol (TENORMIN) 25 MG tablet Take 25 mg by mouth every evening      atenolol (TENORMIN) 50 MG tablet Take 50 mg by mouth every morning      CARBATROL 200 MG 12 hr capsule TAKE TWO CAPSULES BY MOUTH EVERY MORNING AND TAKE TWO CAPSULES BY MOUTH EVERY EVENING 360 capsule 0    levETIRAcetam (KEPPRA) 500 MG tablet TAKE THREE TABLETS  (1500 MG)  BY MOUTH TWICE A  tablet 3    LORazepam (ATIVAN) 0.5 MG tablet TAKE ONE TABLET BY MOUTH EVERY EVENING 90 tablet 1    medical cannabis (Patient's own supply) See Admin Instructions (The purpose of this order is to document that the patient reports taking medical cannabis.  This is not a prescription, and is not used to certify that the patient has " "a qualifying medical condition.)      Multiple Vitamins-Minerals (CENTRUM SILVER) per tablet Take 1 tablet by mouth daily      perampanel (FYCOMPA) 2 MG tablet Week 1: 1 tablet daily. Week 2 - onward: 2 tablets daily 60 tablet 5     Perceived AED Side Effects: Yes  Medication Notes:   AED Medication Compliance:  compliant most of the time  Using a pill box:  Yes  Past AEDs:    Levetiracetam - started in . She is not sure if it helps her seizures.   Carbatrol - started at age 1.   Celontin - \"thorat starting closing and had to go to hospital and placed on breathing\"  Phenytoin  - rash with phenytoin    Mysoline   Phenobarbital   Felbamate - did not work per her memory.   Lacosamide in   - she had persistent tachycardia   Psycho-Social History: Mili Mane currently lives with dad, she is her father's primary caregiver. Mom passed away . ,  She is currently not working.  Her sister is very supportive and so is her family members.She lives with dad, he is 93, he is \"hard on her and stresses her out\".   Currently, patient denies feeling depressed, denies feeling anhedonia, denies suicidal  thoughts, and denies having feelings of excessive guilt/worthlessness.  Does not smoke, rare alcohol use, no recreational drug use. We reviewed importance of mental and emotional wellbeing and impact on health.   Driving:  Currently patient is:  Not driving.  Previous Evaluations for Epilepsy:   EE:   SUMMARY OF 2 DAYS OF AMBULATORY EEG RECORDING:  This is an abnormal EEG due to the presence of:   1.  Intermittent bursts of mixed theta and delta slowing during this recording during wakefulness.   2.  Bilateral temporal independent epileptiform activities during this recording.  No clinical or electrographic seizures were observed during this recording.       MRI of Brain: None on file  Exam:    BP (!) 152/85   Pulse 76   Temp 97.9  F (36.6  C) (Temporal)   Ht 5' 2\" (157.5 cm)   Wt 138 lb 9.6 oz (62.9 " kg)   BMI 25.35 kg/m       Wt Readings from Last 5 Encounters:   02/20/24 138 lb 9.6 oz (62.9 kg)   01/12/24 135 lb 3.2 oz (61.3 kg)   11/21/23 132 lb 3.2 oz (60 kg)   04/04/23 130 lb (59 kg)   01/02/23 146 lb (66.2 kg)       Alert, orientated, speech is fluent    Impression:   Focal epilepsy  Vagus nerve stimulator placed 2017    Discussion: Ms. Brown is a 58-year-old right-handed female who presents with medically refractory epilepsy.  Video EEG evaluation in 2014 showed bitemporal lobe epileptiform discharges.  Her seizure semiology seems consistent with temporal lobe epilepsy with progression to bilateral tonic-clonic seizures.  She is currently on levetiracetam and Carbatrol.  We tired Fycompa and got her to 4 mg/day and she had fatigue on it.  I suspect increasing Fycompa might not be efficacious, with 4 mg of Fycompa per day there is not a reduction in seizures.  She continues to average 5 seizures per month.  We do not have a lot of medication options.  I would like to try oxcarbazepine.  She did have a rash with phenytoin in the past, I did educate them that there is higher risk of rash with oxcarbazepine to monitor for rash.  Combination of oxcarbazepine and carbamazepine may have more side effects.  We will just have to monitor the side effects and monitor her sodium.  I gave her a titration schedule and is scheduled to wean on Fycompa.  This was reviewed twice with her.      Lastly in regards to levetiracetam, her levetiracetam level was low at 6.2 in January 2024 when she had a breakthrough seizure at the assisted living place.  I did review with her if drop in levetiracetam such as this suggest that she may have missed some doses.  She currently takes all her medications with a pillbox.  I encouraged her to check her labs regularly every 1 to 3 months to establish compliance.  She is agreeable with this.  She continues to have drop in antiseizure medication levels this would suggest missed  medications and consideration should be given to assisted living facility administering medications.    On today's visit we reviewed the importance of better seizure control management and the role of epilepsy surgery, specifically neuromodulation.  She expressed understanding of these recommendations and would like to proceed with epilepsy surgery.  Patient was clearly told that she will continue to have seizures most likely, however reducing her seizure burden will reduce risk of bodily injury and memory decline over time.  I suspect she may have bitemporal lobe epilepsy and may need deep brain stimulator device or RNS device.  She already has a vagus nerve stimulator.    In regards to emotional health I encouraged her to establish care with a therapist.        Plan :    Wean off fycompa   Week 1: 2 mg per day   Week 2- stop     Start oxcarbazepine 150 mg tablet                Medication Tablet Size         1 pm    PM (Night)       Week 1   oxcarbazepine 150 mg    1 tablet       Week 2   oxcarbazepine 150 mg 1 tablet   1 tablet       Week 3   oxcarbazepine 150 mg 1 tablet   2 tablet       Week 4   oxcarbazepine 150 mg 2 tablet   2 tablet       Week 5   oxcarbazepine 150 mg 2 tablet    3 tablet       Week 6 - onward   oxcarbazepine 150 mg 3 tablet    3 tablet                   Any questions or side effects to antiepileptic drugs please call [facility] [phone]. MONITOR FOR RASH, TAKE A PICTURE, CALL [facility]. Lower oxcarbazepine to prior week dose    Levetiracetam  (500 mg tablet) - 1500 mg twice a day   Carbatrol (200 mg tablet) 400 mg twice a day   Continue Cobalt oral suspension 1200 mg of CBD and 60 mg THC.  Patient takes 3 mL b.i.d.  Continue Tangerine oral suspension, which is  mg and traces of CBD 3 mL daily at noon.  Lorazepam 0.5 mg night (she was suppose to take it prn, but, she takes it every night)  Certified for cannabis 2023    Mental Health - she trying to established care with therapist.      Vagus nerve stimulator battery 25-50%. Interrogated vagus nerve stimulator device today, we need to check it every 6 months.     Blood pressure consult with primary care provider it was high again today 152/85    Mental Health - she has established care with therapist. Continue working with talk therapist for self care    Check blood labs every 1-3 months.     We reviewed goals of epilepsy surgery: seizure reduction, less seizure related falls. If seizure device is placed patient will have to follow up every 3 months.  Patient was frankly told that our goal is seizure reduction, not seizure freedom and I do not think patient will be able to drive after surgery. More so, in rare instances patients who undergo epilepsy surgery may have worse seizures, mood changes, surgrical risk (stroke/infection/bleeding/death).       I spent 47 minutes in total today to provide comprehensive  medical care.   I spent 5 minutes writing the note and placing orders.   I spent 2 minutes  reviewing the chart.     The rest of the time was spent with the patient in face to face interview. During this time key medical decisions were made with review of medical chart prior to visit, visit with patient, counseling/education, and post visit work, including documentation of note on the day of visit. I addressed all questions the patient/caregiver raised in regards to epilepsy or related medical questions.       Guadalupe Hernandez MD   Epilepsy Staff

## 2024-02-20 NOTE — PATIENT INSTRUCTIONS
Wean off fycompa   Week 1: 2 mg per day   Week 2- stop     Start oxcarbazepine 150 mg tablet                Medication Tablet Size         1 pm    PM (Night)       Week 1   oxcarbazepine 150 mg    1 tablet       Week 2   oxcarbazepine 150 mg 1 tablet   1 tablet       Week 3   oxcarbazepine 150 mg 1 tablet   2 tablet       Week 4   oxcarbazepine 150 mg 2 tablet   2 tablet       Week 5   oxcarbazepine 150 mg 2 tablet    3 tablet       Week 6 - onward   oxcarbazepine 150 mg 3 tablet    3 tablet                   Any questions or side effects to antiepileptic drugs please call [facility] [phone]. MONITOR FOR RASH, TAKE A PICTURE, CALL [facility]. Lower oxcarbazepine to prior week dose    Levetiracetam  (500 mg tablet) - 1500 mg twice a day   Carbatrol (200 mg tablet) 400 mg twice a day   Continue Cobalt oral suspension 1200 mg of CBD and 60 mg THC.  Patient takes 3 mL b.i.d.  Continue Tangerine oral suspension, which is  mg and traces of CBD 3 mL daily at noon.  Lorazepam 0.5 mg night (she was suppose to take it prn, but, she takes it every night)  Certified for cannabis 2023    Mental Health - she trying to established care with therapist.     Vagus nerve stimulator battery 25-50%. Interrogated vagus nerve stimulator device today, we need to check it every 6 months.     Blood pressure consult with primary care provider it was high again today 152/85    Mental Health - she has established care with therapist. Continue working with talk therapist for self care    We reviewed goals of epilepsy surgery: seizure reduction, less seizure related falls. If seizure device is placed patient will have to follow up every 3 months.  Patient was frankly told that our goal is seizure reduction, not seizure freedom and I do not think patient will be able to drive after surgery. More so, in rare instances patients who undergo epilepsy surgery may have worse seizures, mood changes, surgrical risk (stroke/infection/bleeding/death).      Guadalupe Hernandez MD

## 2024-02-20 NOTE — LETTER
"2024       RE: Mili Mane  : 1965   MRN: [id number]        Dear Colleague,      Thank you for referring your patient, Mili Mane, to the [facility] EPILEPSY CARE at [facility]. Please see a copy of my visit note below.    [facility] Epilepsy Care Progress Note    Patient:  Mili Mane  :  1965   Age:  58 year old   Today's Office Visit:  2024      Epilepsy History:  Her seizure started at age 18 months, early on she had \"petit mal\" which continued to age 11.  When she was born she had \" some oxygen issues at birth, then as infant she had recurrent fever\". Early childhood she was on phenobarbital, phenytoin, mysoline and she continued to have starting spells several time per week.  It seems over the years she has tried several seizure medications but continues to have high seizure burden.  Her recurrent spells seem consistent with focal impaired seizures.She followed with Dr. Kendall for 33 years.  Interval history:   she is accompanied today with her friend Batsheva (relative), her sister Patricia was not here, niece Talya is  with her today. Last visit was 2023. She moved to assisted living 2024. She went to er because assisted living placed call EMS when she had focal seizures with impairment in awareness. She had one fall secondary to a seizure, fall 2024. She has 1-2 falls per month. She started fycompa and feels tired on it. Her father passed away 2024. She states \"my stomach is upset, increased acid\". She does not like fatigue on fycompa. Her levetiracetam was low at 6.2 in 2024. She uses pill box, I asked her to check labs every 1-2 months to check on compliance. She is agreeable. Maybe  there were many stressors.    I reviewed the importance of epilepsy surgery and better seizure control and how it impacts multiple domains of your life such as depression, anxiety, memory decline, physical harm from " "seizures, etc.  Patient expressed understanding of these recommendations and would like to put this on hold for now -  epilepsy surgical evaluation.  Seizure types:   Seizure type 1: focal seizures with impairment in awareness  - Aura of ringing in ear, stares, last 30 seconds, started 18 months. Per Patricia she has a \"blank stare for minutes\". No automatisms. She calls these \"small\" she describes them as \"I have loss of awareness, I do not get hurt, and I do not fall. She may have 5 seizure per month.  She describes another type of seizure as \"medium\" seizures in which she has loss of awareness and then when she comes to she is not able to talk for 10-15 minutes.  She had 1 \"medium\" in 1/2024.  Usually seizure are at 7 pm.   Seizure type 2: generalized tonic-clonic convulsion - whole body stiffening and shaking, she bites her cheek, may she had under 10 in her lifetime. Last \"hard seizure\" was 11/2021. In 11/2021 she was walking her dog, she fell and was confused.   Current antiepileptic drugs:   Levetiracetam  (500 mg tablet) - 1500 mg twice a day   Carbatrol (200 mg tablet) 400 mg twice a day   Fycompa 2 mg tablet - takes 2 tablet daily   Cobalt oral suspension 1200 mg of CBD and 60 mg THC.  Patient takes 3 mL b.i.d.  Tangerine oral suspension, which is  mg and traces of CBD 3 mL daily at noon.  Lorazepam 0.5 mg night (she was suppose to take it prn, but, she takes it every night)  Precipitating factors:   Sleep Deprivation, Stress and Failure to take AED  Current Outpatient Medications   Medication Sig Dispense Refill    atenolol (TENORMIN) 25 MG tablet Take 25 mg by mouth every evening      atenolol (TENORMIN) 50 MG tablet Take 50 mg by mouth every morning      CARBATROL 200 MG 12 hr capsule TAKE TWO CAPSULES BY MOUTH EVERY MORNING AND TAKE TWO CAPSULES BY MOUTH EVERY EVENING 360 capsule 0    levETIRAcetam (KEPPRA) 500 MG tablet TAKE THREE TABLETS  (1500 MG)  BY MOUTH TWICE A  tablet 3    LORazepam " "(ATIVAN) 0.5 MG tablet TAKE ONE TABLET BY MOUTH EVERY EVENING 90 tablet 1    medical cannabis (Patient's own supply) See Admin Instructions (The purpose of this order is to document that the patient reports taking medical cannabis.  This is not a prescription, and is not used to certify that the patient has a qualifying medical condition.)      Multiple Vitamins-Minerals (CENTRUM SILVER) per tablet Take 1 tablet by mouth daily      perampanel (FYCOMPA) 2 MG tablet Week 1: 1 tablet daily. Week 2 - onward: 2 tablets daily 60 tablet 5     Perceived AED Side Effects: Yes  Medication Notes:   AED Medication Compliance:  compliant most of the time  Using a pill box:  Yes  Past AEDs:    Levetiracetam - started in . She is not sure if it helps her seizures.   Carbatrol - started at age 1.   Celontin - \"thorat starting closing and had to go to hospital and placed on breathing\"  Phenytoin  - rash with phenytoin    Mysoline   Phenobarbital   Felbamate - did not work per her memory.   Lacosamide in   - she had persistent tachycardia   Psycho-Social History: Mili Mane currently lives with dad, she is her father's primary caregiver. Mom passed away . ,  She is currently not working.  Her sister is very supportive and so is her family members.She lives with dad, he is 93, he is \"hard on her and stresses her out\".   Currently, patient denies feeling depressed, denies feeling anhedonia, denies suicidal  thoughts, and denies having feelings of excessive guilt/worthlessness.  Does not smoke, rare alcohol use, no recreational drug use. We reviewed importance of mental and emotional wellbeing and impact on health.   Driving:  Currently patient is:  Not driving.  Previous Evaluations for Epilepsy:   EE:   SUMMARY OF 2 DAYS OF AMBULATORY EEG RECORDING:  This is an abnormal EEG due to the presence of:   1.  Intermittent bursts of mixed theta and delta slowing during this recording during wakefulness.   2.  " "Bilateral temporal independent epileptiform activities during this recording.  No clinical or electrographic seizures were observed during this recording.       MRI of Brain: None on file  Exam:    BP (!) 152/85   Pulse 76   Temp 97.9  F (36.6  C) (Temporal)   Ht 5' 2\" (157.5 cm)   Wt 138 lb 9.6 oz (62.9 kg)   BMI 25.35 kg/m       Wt Readings from Last 5 Encounters:   02/20/24 138 lb 9.6 oz (62.9 kg)   01/12/24 135 lb 3.2 oz (61.3 kg)   11/21/23 132 lb 3.2 oz (60 kg)   04/04/23 130 lb (59 kg)   01/02/23 146 lb (66.2 kg)       Alert, orientated, speech is fluent    Impression:   Focal epilepsy  Vagus nerve stimulator placed 2017    Discussion: Ms. Brown is a 58-year-old right-handed female who presents with medically refractory epilepsy.  Video EEG evaluation in 2014 showed bitemporal lobe epileptiform discharges.  Her seizure semiology seems consistent with temporal lobe epilepsy with progression to bilateral tonic-clonic seizures.  She is currently on levetiracetam and Carbatrol.  We tired Fycompa and got her to 4 mg/day and she had fatigue on it.  I suspect increasing Fycompa might not be efficacious, with 4 mg of Fycompa per day there is not a reduction in seizures.  She continues to average 5 seizures per month.  We do not have a lot of medication options.  I would like to try oxcarbazepine.  She did have a rash with phenytoin in the past, I did educate them that there is higher risk of rash with oxcarbazepine to monitor for rash.  Combination of oxcarbazepine and carbamazepine may have more side effects.  We will just have to monitor the side effects and monitor her sodium.  I gave her a titration schedule and is scheduled to wean on Fycompa.  This was reviewed twice with her.      Lastly in regards to levetiracetam, her levetiracetam level was low at 6.2 in January 2024 when she had a breakthrough seizure at the assisted living place.  I did review with her if drop in levetiracetam such as this " suggest that she may have missed some doses.  She currently takes all her medications with a pillbox.  I encouraged her to check her labs regularly every 1 to 3 months to establish compliance.  She is agreeable with this.  She continues to have drop in antiseizure medication levels this would suggest missed medications and consideration should be given to assisted living facility administering medications.    On today's visit we reviewed the importance of better seizure control management and the role of epilepsy surgery, specifically neuromodulation.  She expressed understanding of these recommendations and would like to proceed with epilepsy surgery.  Patient was clearly told that she will continue to have seizures most likely, however reducing her seizure burden will reduce risk of bodily injury and memory decline over time.  I suspect she may have bitemporal lobe epilepsy and may need deep brain stimulator device or RNS device.  She already has a vagus nerve stimulator.    In regards to emotional health I encouraged her to establish care with a therapist.        Plan :    Wean off fycompa   Week 1: 2 mg per day   Week 2- stop     Start oxcarbazepine 150 mg tablet                Medication Tablet Size         1 pm    PM (Night)       Week 1   oxcarbazepine 150 mg    1 tablet       Week 2   oxcarbazepine 150 mg 1 tablet   1 tablet       Week 3   oxcarbazepine 150 mg 1 tablet   2 tablet       Week 4   oxcarbazepine 150 mg 2 tablet   2 tablet       Week 5   oxcarbazepine 150 mg 2 tablet    3 tablet       Week 6 - onward   oxcarbazepine 150 mg 3 tablet    3 tablet                   Any questions or side effects to antiepileptic drugs please call [facility] [phone]. MONITOR FOR RASH, TAKE A PICTURE, CALL [facility]. Lower oxcarbazepine to prior week dose    Levetiracetam  (500 mg tablet) - 1500 mg twice a day   Carbatrol (200 mg tablet) 400 mg twice a day   Continue Cobalt oral suspension 1200 mg of CBD and 60 mg THC.   Patient takes 3 mL b.i.d.  Continue Tangerine oral suspension, which is  mg and traces of CBD 3 mL daily at noon.  Lorazepam 0.5 mg night (she was suppose to take it prn, but, she takes it every night)  Certified for cannabis 2023    Mental Health - she trying to established care with therapist.     Vagus nerve stimulator battery 25-50%. Interrogated vagus nerve stimulator device today, we need to check it every 6 months.     Blood pressure consult with primary care provider it was high again today 152/85    Mental Health - she has established care with therapist. Continue working with talk therapist for self care    Check blood labs every 1-3 months.     We reviewed goals of epilepsy surgery: seizure reduction, less seizure related falls. If seizure device is placed patient will have to follow up every 3 months.  Patient was frankly told that our goal is seizure reduction, not seizure freedom and I do not think patient will be able to drive after surgery. More so, in rare instances patients who undergo epilepsy surgery may have worse seizures, mood changes, surgrical risk (stroke/infection/bleeding/death).       I spent 47 minutes in total today to provide comprehensive  medical care.   I spent 5 minutes writing the note and placing orders.   I spent 2 minutes  reviewing the chart.     The rest of the time was spent with the patient in face to face interview. During this time key medical decisions were made with review of medical chart prior to visit, visit with patient, counseling/education, and post visit work, including documentation of note on the day of visit. I addressed all questions the patient/caregiver raised in regards to epilepsy or related medical questions.           Again, thank you for allowing me to participate in the care of your patient.      Sincerely,    Guadalupe Hernandez MD

## 2024-03-20 DIAGNOSIS — G40.109 LOCALIZATION-RELATED FOCAL EPILEPSY WITH SIMPLE PARTIAL SEIZURES (H): ICD-10-CM

## 2024-03-20 RX ORDER — LORAZEPAM 0.5 MG/1
TABLET ORAL
Qty: 90 TABLET | Refills: 1 | Status: SHIPPED | OUTPATIENT
Start: 2024-03-20 | End: 2024-09-10

## 2024-03-20 NOTE — TELEPHONE ENCOUNTER
LORAZEPAM 0.5MG TABS       Last Written Prescription Date:  9-25-23  Last Fill Quantity: 90,   # refills: 1  Last Office Visit : 2-20-24  Future Office visit:  5-29-24    Routing refill request to provider for review/approval because:  Drug not on the [facility], [facility] or [facility] refill protocol or controlled substance

## 2024-03-27 DIAGNOSIS — G40.209 PARTIAL SYMPTOMATIC EPILEPSY WITH COMPLEX PARTIAL SEIZURES, NOT INTRACTABLE, WITHOUT STATUS EPILEPTICUS (H): ICD-10-CM

## 2024-04-01 ENCOUNTER — LAB (OUTPATIENT)
Dept: LAB | Facility: CLINIC | Age: 59
End: 2024-04-01
Payer: COMMERCIAL

## 2024-04-01 DIAGNOSIS — G40.219 PARTIAL SYMPTOMATIC EPILEPSY WITH COMPLEX PARTIAL SEIZURES, INTRACTABLE, WITHOUT STATUS EPILEPTICUS (H): ICD-10-CM

## 2024-04-01 LAB
LEVETIRACETAM SERPL-MCNC: 29.3 ΜG/ML (ref 10–40)
SODIUM SERPL-SCNC: 131 MMOL/L (ref 135–145)

## 2024-04-01 PROCEDURE — 84295 ASSAY OF SERUM SODIUM: CPT

## 2024-04-01 PROCEDURE — 80183 DRUG SCRN QUANT OXCARBAZEPIN: CPT | Mod: 90

## 2024-04-01 PROCEDURE — 80161 ASY CARBAMAZEPIN 10,11-EPXID: CPT | Mod: 90

## 2024-04-01 PROCEDURE — 80177 DRUG SCRN QUAN LEVETIRACETAM: CPT

## 2024-04-01 PROCEDURE — 80156 ASSAY CARBAMAZEPINE TOTAL: CPT | Mod: 90

## 2024-04-01 PROCEDURE — 36415 COLL VENOUS BLD VENIPUNCTURE: CPT

## 2024-04-01 PROCEDURE — 99000 SPECIMEN HANDLING OFFICE-LAB: CPT

## 2024-04-02 RX ORDER — CARBAMAZEPINE 200 MG/1
CAPSULE, EXTENDED RELEASE ORAL
Qty: 360 CAPSULE | Refills: 0 | Status: SHIPPED | OUTPATIENT
Start: 2024-04-02 | End: 2024-05-29

## 2024-04-03 LAB — 10OH-CARBAZEPINE SERPL-MCNC: 4 UG/ML

## 2024-04-10 LAB
CARBAMAZEPINE EP SERPL-MCNC: 2.4 UG/ML
CARBAMAZEPINE SERPL-MCNC: 6.9 UG/ML

## 2024-04-15 ENCOUNTER — TELEPHONE (OUTPATIENT)
Dept: NEUROLOGY | Facility: CLINIC | Age: 59
End: 2024-04-15

## 2024-04-15 NOTE — TELEPHONE ENCOUNTER
Patient is asking when the bycompa will be out of her system have been sick to your stomach and have had a headache and light sensitivity. Patient states she doesn't know how long she be on that medication.   Patient is taking the new medication and wanted to know what the side effects are for the new medication to rule out if the old medication is making some of the issues or not.     Phone number on file is correct and OK to leave detailed message if need.

## 2024-04-17 NOTE — TELEPHONE ENCOUNTER
Chart reviewed.  Based on the most recent MD note and instructions, patient is taking:  LEV 1500 - 1500   - 400   - 450    Patient also taking cannabinoids   Tangerine 240/trace THC/CBD  Cobalt 60 / 1200 THC/CBD    Recent levels (4/1/2024)  Lab on 04/01/2024   Component Date Value Ref Range Status    Keppra (Levetiracetam) Level 04/01/2024 29.3  10.0 - 40.0  g/mL Final    10, 11 Epoxide Level 04/01/2024 2.4  ug/mL Final    Carbamazepine Total Level 04/01/2024 6.9  4.0 - 12.0 ug/mL Final    Oxcarb or Eslicarb Metabolite ([facility]) 04/01/2024 4  3 - 35 ug/mL Final    Sodium 04/01/2024 131 (L)  135 - 145 mmol/L Final     Based on reported side effects, risk of interactions between and hyponatraemia from both CBZ and OXC, and potential interactions with cannabinoids, will need to recheck levels of serum sodium and Antiseizure medications.([facility]  labs protocol)    Call placed to patient, voice message left explained that Fycompa will be out of her system, and a call back number for her to contact me for further discussion.

## 2024-04-23 ENCOUNTER — TELEPHONE (OUTPATIENT)
Dept: NEUROLOGY | Facility: CLINIC | Age: 59
End: 2024-04-23

## 2024-04-23 NOTE — TELEPHONE ENCOUNTER
What is the concern that needs to be addressed by a nurse?     Patient is calling because she would like to know what over the counter medication she can take that will not interfer with her seizure medication, her PCP told her to contact her seizure provider     May a detailed message be left on voicemail? yes    Date of last office visit: 2-20-24    Message routed to: rn pool

## 2024-04-24 NOTE — TELEPHONE ENCOUNTER
Spoke with patient. Said that she had the flu for a while with GI symptoms and headaches. Patient was wondering about taking OTC meds with seizure meds. At time of phone call patient expressed she was no longer sick but was curious about what was safe to to take in the future if this were to happen again. Writer explained to patient that a generally OTC tylenol or ibuprofen is safe to use for fever/headaches. Patient asked about imodium or Pepto bismol in which writer replied that I was not sure about those two and will send Dr. Hernandez a message to ask and confirm.     Patient verbalized understanding. Patient had no further questions.

## 2024-05-01 ENCOUNTER — LAB (OUTPATIENT)
Dept: LAB | Facility: CLINIC | Age: 59
End: 2024-05-01
Payer: COMMERCIAL

## 2024-05-01 DIAGNOSIS — G40.219 PARTIAL SYMPTOMATIC EPILEPSY WITH COMPLEX PARTIAL SEIZURES, INTRACTABLE, WITHOUT STATUS EPILEPTICUS (H): ICD-10-CM

## 2024-05-01 PROCEDURE — 80183 DRUG SCRN QUANT OXCARBAZEPIN: CPT | Mod: 90

## 2024-05-01 PROCEDURE — 80177 DRUG SCRN QUAN LEVETIRACETAM: CPT

## 2024-05-01 PROCEDURE — 80161 ASY CARBAMAZEPIN 10,11-EPXID: CPT | Mod: 90

## 2024-05-01 PROCEDURE — 36415 COLL VENOUS BLD VENIPUNCTURE: CPT

## 2024-05-01 PROCEDURE — 84295 ASSAY OF SERUM SODIUM: CPT

## 2024-05-01 PROCEDURE — 80156 ASSAY CARBAMAZEPINE TOTAL: CPT | Mod: 90

## 2024-05-01 PROCEDURE — 99000 SPECIMEN HANDLING OFFICE-LAB: CPT

## 2024-05-02 LAB
LEVETIRACETAM SERPL-MCNC: 28.3 ΜG/ML (ref 10–40)
SODIUM SERPL-SCNC: 132 MMOL/L (ref 135–145)

## 2024-05-04 LAB — 10OH-CARBAZEPINE SERPL-MCNC: 8 UG/ML

## 2024-05-10 LAB
CARBAMAZEPINE EP SERPL-MCNC: 2 UG/ML
CARBAMAZEPINE SERPL-MCNC: 6.1 UG/ML

## 2024-05-29 ENCOUNTER — VIRTUAL VISIT (OUTPATIENT)
Dept: NEUROLOGY | Facility: CLINIC | Age: 59
End: 2024-05-29
Payer: COMMERCIAL

## 2024-05-29 DIAGNOSIS — G40.209 PARTIAL SYMPTOMATIC EPILEPSY WITH COMPLEX PARTIAL SEIZURES, NOT INTRACTABLE, WITHOUT STATUS EPILEPTICUS (H): ICD-10-CM

## 2024-05-29 DIAGNOSIS — G40.219 PARTIAL SYMPTOMATIC EPILEPSY WITH COMPLEX PARTIAL SEIZURES, INTRACTABLE, WITHOUT STATUS EPILEPTICUS (H): ICD-10-CM

## 2024-05-29 RX ORDER — CARBAMAZEPINE 200 MG/1
CAPSULE, EXTENDED RELEASE ORAL
Qty: 360 CAPSULE | Refills: 3 | Status: ON HOLD | OUTPATIENT
Start: 2024-05-29

## 2024-05-29 RX ORDER — OXCARBAZEPINE 150 MG/1
TABLET, FILM COATED ORAL
Qty: 50 TABLET | Refills: 0 | Status: SHIPPED | OUTPATIENT
Start: 2024-05-29 | End: 2024-08-21

## 2024-05-29 RX ORDER — LEVETIRACETAM 500 MG/1
TABLET ORAL
Qty: 540 TABLET | Refills: 3 | Status: SHIPPED | OUTPATIENT
Start: 2024-05-29 | End: 2024-08-21

## 2024-05-29 ASSESSMENT — PATIENT HEALTH QUESTIONNAIRE - PHQ9
10. IF YOU CHECKED OFF ANY PROBLEMS, HOW DIFFICULT HAVE THESE PROBLEMS MADE IT FOR YOU TO DO YOUR WORK, TAKE CARE OF THINGS AT HOME, OR GET ALONG WITH OTHER PEOPLE: NOT DIFFICULT AT ALL
SUM OF ALL RESPONSES TO PHQ QUESTIONS 1-9: 14
SUM OF ALL RESPONSES TO PHQ QUESTIONS 1-9: 14

## 2024-05-29 NOTE — LETTER
"2024       RE: Mili Mane  : 1965   MRN: [id number]        Dear Colleague,    Thank you for referring your patient, Mili Mane, to the [facility] EPILEPSY CARE at [facility]. Please see a copy of my visit note below.  [facility]    Patient:  Mili Mane  :  1965   Age:  59 year old   Today's Office Visit:  2024      Epilepsy History:  Her seizure started at age 18 months, early on she had \"petit mal\" which continued to age 11.  When she was born she had \" some oxygen issues at birth, then as infant she had recurrent fever\". Early childhood she was on phenobarbital, phenytoin, mysoline and she continued to have starting spells several time per week.  It seems over the years she has tried several seizure medications but continues to have high seizure burden.  Her recurrent spells seem consistent with focal impaired seizures.She followed with Dr. Kendall for 33 years.  Interval history:   she is accompanied today with her friend Batsheva (relative), her sister Patricia was not here, niece Cierra is  with her today. Last visit was 2024. She weaned off fycompa. She is on oxcarbazepine 450 mg twice a day. She has not noticed a change in her seizures. She is very tired on oxcarbazepine. She is not walking. She has 10 seizure per month. Her seizure are worse. It seems the fycompa was helpful she had 5 seizure per month per my old notes, however, she now states she might have had more seizure and did not record all seizure. Her mood is worse. She moved to assisted living 2024. She has increased falls. She has 1 fall per month. On fycompa she was tired.  She uses pill box. Her na + is low at 131 and 132.     Component      Latest Ref Rng 2024  1:27 PM 2024  1:17 PM   10, 11 Epoxide Level      ug/mL  2.0    Carbamazepine Total Level      4.0 - 12.0 ug/mL  6.1    Sodium      135 - 145 mmol/L 131 " "(L)     Keppra (Levetiracetam) Level      10.0 - 40.0  g/mL  28.3    Oxcarb or Eslicarb Metabolite (MHD)      3 - 35 ug/mL  8       Legend:  (L) Low  Seizure types:   Seizure type 1: focal seizures with impairment in awareness  - Aura of ringing in ear, stares, last 30 seconds, started 18 months. Per Patricia she has a \"blank stare for minutes\". No automatisms. She calls these \"small\" she describes them as \"I have loss of awareness, I do not get hurt, and I do not fall. Frequency 10 per month.   Seizure type 2: generalized tonic-clonic convulsion - whole body stiffening and shaking, she bites her cheek, may she had under 10 in her lifetime. Last \"hard seizure\" was 11/2021. In 11/2021 she was walking her dog, she fell and was confused.   Current antiepileptic drugs:   Levetiracetam  (500 mg tablet) - 1500 mg twice a day   Carbatrol (200 mg tablet) 400 mg twice a day   Oxcarbazepine (150 mg tablet) 3 tablet morning and 3 tablet night   Cobalt oral suspension 1200 mg of CBD and 60 mg THC.  Patient takes 3 mL b.i.d.  Tangerine oral suspension, which is  mg and traces of CBD 3 mL daily at noon.  Lorazepam 0.5 mg night (she was suppose to take it prn, but, she takes it every night)  Precipitating factors:   Sleep Deprivation, Stress and Failure to take AED  Current Outpatient Medications   Medication Sig Dispense Refill    atenolol (TENORMIN) 25 MG tablet Take 25 mg by mouth every evening      atenolol (TENORMIN) 50 MG tablet Take 50 mg by mouth every morning      CARBATROL 200 MG 12 hr capsule TAKE TWO CAPSULES BY MOUTH EVERY MORNING AND TAKE TWO CAPSULES BY MOUTH EVERY EVENING 360 capsule 0    levETIRAcetam (KEPPRA) 500 MG tablet TAKE THREE TABLETS  (1500 MG)  BY MOUTH TWICE A  tablet 3    LORazepam (ATIVAN) 0.5 MG tablet TAKE ONE TABLET BY MOUTH EVERY EVENING 90 tablet 1    medical cannabis (Patient's own supply) See Admin Instructions (The purpose of this order is to document that the patient reports taking " "medical cannabis.  This is not a prescription, and is not used to certify that the patient has a qualifying medical condition.)      Multiple Vitamins-Minerals (CENTRUM SILVER) per tablet Take 1 tablet by mouth daily      OXcarbazepine (TRILEPTAL) 150 MG tablet Increase as instructed to 450 mg twice a day (3 tablet twice a day) 180 tablet 3    perampanel (FYCOMPA) 2 MG tablet Week 1: 1 tablet daily. Week 2 - onward: 2 tablets daily 60 tablet 5     Perceived AED Side Effects: Yes  Medication Notes:   AED Medication Compliance:  compliant most of the time  Using a pill box:  Yes  Past AEDs:    Levetiracetam - started in 2012. She is not sure if it helps her seizures.   Carbatrol - started at age 1.   Celontin - \"thorat starting closing and had to go to hospital and placed on breathing\"  Phenytoin  - rash with phenytoin    Mysoline   Phenobarbital   Felbamate - did not work per her memory.   Lacosamide in 2014  - she had persistent tachycardia   Psycho-Social History: Mili Mane currently lives with dad, she is her father's primary caregiver. Mom passed away 2017. ,  She is currently not working.  Her sister is very supportive and so is her family members.She lives with dad, he is 93, he is \"hard on her and stresses her out\".   Currently, patient denies feeling depressed, denies feeling anhedonia, denies suicidal  thoughts, and denies having feelings of excessive guilt/worthlessness.  Does not smoke, rare alcohol use, no recreational drug use. We reviewed importance of mental and emotional wellbeing and impact on health.   Driving:  Currently patient is:  Not driving.  Exam:    There were no vitals taken for this visit.     Wt Readings from Last 5 Encounters:   02/20/24 138 lb 9.6 oz (62.9 kg)   01/12/24 135 lb 3.2 oz (61.3 kg)   11/21/23 132 lb 3.2 oz (60 kg)   04/04/23 130 lb (59 kg)   01/02/23 146 lb (66.2 kg)       Alert, orientated, speech is fluent    Impression:   Focal epilepsy  Vagus nerve stimulator " placed 2017     Discussion: Ms. Brown is a 58-year-old right-handed female who presents with medically refractory epilepsy.  Video EEG evaluation in 2014 showed bitemporal lobe epileptiform discharges.  Her seizure semiology seems consistent with temporal lobe epilepsy with progression to bilateral tonic-clonic seizures.  She is currently on levetiracetam and Carbatrol and oxcarbazepine. Her seizure seem worse on oxcarbazepine and mood is also worse. We will wean off oxcarbazepine because not efficacious, worsened mood, and lowered sodium.  She has 10 seizures per month (focal seizures with impairment in awareness ).  She needs vagus nerve stimulator check in next 1-2 months.       Plan :    Wean off  oxcarbazepine    Week 1: 2 tablet morning and 2 tablet night    Week 2: 1 tablet morning and 1 tablet night     Week 3: 1 tablet morning and 0 tablet night    Week 4: stop oxcarbazepine     Continue:     Levetiracetam  (500 mg tablet) - 1500 mg twice a day   Carbatrol (200 mg tablet) 400 mg twice a day   Cobalt oral suspension 1200 mg of CBD and 60 mg THC.  Patient takes 3 mL b.i.d.  Tangerine oral suspension, which is  mg and traces of CBD 3 mL daily at noon.  Lorazepam 0.5 mg night (she was suppose to take it prn, but, she takes it every night)    Vagus nerve stimulator battery 25-50%. Need to Interrogate vagus nerve stimulator device in clinic  in next 1-2 month with [facility] - she will establish care with therapist in June 2024. Continue working with talk therapist for self care    I spent 31 minutes in total today to provide comprehensive  medical care.   I spent 5 minutes writing the note and placing orders.   I spent 2 minutes  reviewing the chart.     The rest of the time was spent with the patient in face to face interview. During this time key medical decisions were made with review of medical chart prior to visit, visit with patient, counseling/education, and post visit work,  including documentation of note on the day of visit. I addressed all questions the patient/caregiver raised in regards to epilepsy or related medical questions.     Answers submitted by the patient for this visit:  Patient Health Questionnaire (Submitted on 5/29/2024)  If you checked off any problems, how difficult have these problems made it for you to do your work, take care of things at home, or get along with other people?: Not difficult at all  PHQ9 TOTAL SCORE: 14        Again, thank you for allowing me to participate in the care of your patient.      Sincerely,    Guadalupe Hernandez MD

## 2024-05-29 NOTE — PATIENT INSTRUCTIONS
Wean off  oxcarbazepine    Week 1: 2 tablet morning and 2 tablet night    Week 2: 1 tablet morning and 1 tablet night     Week 3: 1 tablet morning and 0 tablet night    Week 4: stop oxcarbazepine     Continue:     Levetiracetam  (500 mg tablet) - 1500 mg twice a day   Carbatrol (200 mg tablet) 400 mg twice a day   Cobalt oral suspension 1200 mg of CBD and 60 mg THC.  Patient takes 3 mL b.i.d.  Tangerine oral suspension, which is  mg and traces of CBD 3 mL daily at noon.  Lorazepam 0.5 mg night (she was suppose to take it prn, but, she takes it every night)    Vagus nerve stimulator battery 25-50%. Need to Interrogate vagus nerve stimulator device in clinic  in next 1-2 month with [facility] - she will establish care with therapist in June 2024. Continue working with talk therapist for self care      Guadalupe Hernandez MD

## 2024-05-29 NOTE — NURSING NOTE
Is the patient currently in the state of [place]? YES    Visit mode:VIDEO    If the visit is dropped, the patient can be reconnected by: VIDEO VISIT: Send to e-mail at: [email or web address]    Will anyone else be joining the visit? NO  (If patient encounters technical issues they should call [phone]    How would you like to obtain your AVS? MyChart    Are changes needed to the allergy or medication list? No    Are refills needed on medications prescribed by this physician? NO    Reason for visit: DEEPA POST

## 2024-05-29 NOTE — PROGRESS NOTES
"Virtual Visit Details    Type of service:  Video Visit   Video Start Time: 1:54 PM  Video End Time:2:20 PM    Originating Location (pt. Location): Home    Distant Location (provider location):  Off-site  Platform used for Video Visit: [facility] Progress Note    Patient:  Mili Mane  :  1965   Age:  59 year old   Today's Office Visit:  2024      Epilepsy History:  Her seizure started at age 18 months, early on she had \"petit mal\" which continued to age 11.  When she was born she had \" some oxygen issues at birth, then as infant she had recurrent fever\". Early childhood she was on phenobarbital, phenytoin, mysoline and she continued to have starting spells several time per week.  It seems over the years she has tried several seizure medications but continues to have high seizure burden.  Her recurrent spells seem consistent with focal impaired seizures.She followed with Dr. Kendall for 33 years.  Interval history:   she is accompanied today with her friend Batsheva (relative), her sister Patricia was not here, niece Cierra is  with her today. Last visit was 2024. She weaned off fycompa. She is on oxcarbazepine 450 mg twice a day. She has not noticed a change in her seizures. She is very tired on oxcarbazepine. She is not walking. She has 10 seizure per month. Her seizure are worse. It seems the fycompa was helpful she had 5 seizure per month per my old notes, however, she now states she might have had more seizure and did not record all seizure. Her mood is worse. She moved to assisted living 2024. She has increased falls. She has 1 fall per month. On fycompa she was tired.  She uses pill box. Her na + is low at 131 and 132.     Component      Latest Ref Rng 2024  1:27 PM 2024  1:17 PM   10, 11 Epoxide Level      ug/mL  2.0    Carbamazepine Total Level      4.0 - 12.0 ug/mL  6.1    Sodium      135 - 145 mmol/L 131 (L)     Keppra (Levetiracetam) Level      10.0 - 40.0 " " g/mL  28.3    Oxcarb or Eslicarb Metabolite (MHD)      3 - 35 ug/mL  8       Legend:  (L) Low  Seizure types:   Seizure type 1: focal seizures with impairment in awareness  - Aura of ringing in ear, stares, last 30 seconds, started 18 months. Per Patricia she has a \"blank stare for minutes\". No automatisms. She calls these \"small\" she describes them as \"I have loss of awareness, I do not get hurt, and I do not fall. Frequency 10 per month.   Seizure type 2: generalized tonic-clonic convulsion - whole body stiffening and shaking, she bites her cheek, may she had under 10 in her lifetime. Last \"hard seizure\" was 11/2021. In 11/2021 she was walking her dog, she fell and was confused.   Current antiepileptic drugs:   Levetiracetam  (500 mg tablet) - 1500 mg twice a day   Carbatrol (200 mg tablet) 400 mg twice a day   Oxcarbazepine (150 mg tablet) 3 tablet morning and 3 tablet night   Cobalt oral suspension 1200 mg of CBD and 60 mg THC.  Patient takes 3 mL b.i.d.  Tangerine oral suspension, which is  mg and traces of CBD 3 mL daily at noon.  Lorazepam 0.5 mg night (she was suppose to take it prn, but, she takes it every night)  Precipitating factors:   Sleep Deprivation, Stress and Failure to take AED  Current Outpatient Medications   Medication Sig Dispense Refill    atenolol (TENORMIN) 25 MG tablet Take 25 mg by mouth every evening      atenolol (TENORMIN) 50 MG tablet Take 50 mg by mouth every morning      CARBATROL 200 MG 12 hr capsule TAKE TWO CAPSULES BY MOUTH EVERY MORNING AND TAKE TWO CAPSULES BY MOUTH EVERY EVENING 360 capsule 0    levETIRAcetam (KEPPRA) 500 MG tablet TAKE THREE TABLETS  (1500 MG)  BY MOUTH TWICE A  tablet 3    LORazepam (ATIVAN) 0.5 MG tablet TAKE ONE TABLET BY MOUTH EVERY EVENING 90 tablet 1    medical cannabis (Patient's own supply) See Admin Instructions (The purpose of this order is to document that the patient reports taking medical cannabis.  This is not a prescription, and is " "not used to certify that the patient has a qualifying medical condition.)      Multiple Vitamins-Minerals (CENTRUM SILVER) per tablet Take 1 tablet by mouth daily      OXcarbazepine (TRILEPTAL) 150 MG tablet Increase as instructed to 450 mg twice a day (3 tablet twice a day) 180 tablet 3    perampanel (FYCOMPA) 2 MG tablet Week 1: 1 tablet daily. Week 2 - onward: 2 tablets daily 60 tablet 5     Perceived AED Side Effects: Yes  Medication Notes:   AED Medication Compliance:  compliant most of the time  Using a pill box:  Yes  Past AEDs:    Levetiracetam - started in 2012. She is not sure if it helps her seizures.   Carbatrol - started at age 1.   Celontin - \"thorat starting closing and had to go to hospital and placed on breathing\"  Phenytoin  - rash with phenytoin    Mysoline   Phenobarbital   Felbamate - did not work per her memory.   Lacosamide in 2014  - she had persistent tachycardia   Psycho-Social History: Mili Mane currently lives with dad, she is her father's primary caregiver. Mom passed away 2017. ,  She is currently not working.  Her sister is very supportive and so is her family members.She lives with dad, he is 93, he is \"hard on her and stresses her out\".   Currently, patient denies feeling depressed, denies feeling anhedonia, denies suicidal  thoughts, and denies having feelings of excessive guilt/worthlessness.  Does not smoke, rare alcohol use, no recreational drug use. We reviewed importance of mental and emotional wellbeing and impact on health.   Driving:  Currently patient is:  Not driving.  Exam:    There were no vitals taken for this visit.     Wt Readings from Last 5 Encounters:   02/20/24 138 lb 9.6 oz (62.9 kg)   01/12/24 135 lb 3.2 oz (61.3 kg)   11/21/23 132 lb 3.2 oz (60 kg)   04/04/23 130 lb (59 kg)   01/02/23 146 lb (66.2 kg)       Alert, orientated, speech is fluent    Impression:   Focal epilepsy  Vagus nerve stimulator placed 2017     Discussion: Ms. Brown is a " 58-year-old right-handed female who presents with medically refractory epilepsy.  Video EEG evaluation in 2014 showed bitemporal lobe epileptiform discharges.  Her seizure semiology seems consistent with temporal lobe epilepsy with progression to bilateral tonic-clonic seizures.  She is currently on levetiracetam and Carbatrol and oxcarbazepine. Her seizure seem worse on oxcarbazepine and mood is also worse. We will wean off oxcarbazepine because not efficacious, worsened mood, and lowered sodium.  She has 10 seizures per month (focal seizures with impairment in awareness ).  She needs vagus nerve stimulator check in next 1-2 months.       Plan :    Wean off  oxcarbazepine    Week 1: 2 tablet morning and 2 tablet night    Week 2: 1 tablet morning and 1 tablet night     Week 3: 1 tablet morning and 0 tablet night    Week 4: stop oxcarbazepine     Continue:     Levetiracetam  (500 mg tablet) - 1500 mg twice a day   Carbatrol (200 mg tablet) 400 mg twice a day   Cobalt oral suspension 1200 mg of CBD and 60 mg THC.  Patient takes 3 mL b.i.d.  Tangerine oral suspension, which is  mg and traces of CBD 3 mL daily at noon.  Lorazepam 0.5 mg night (she was suppose to take it prn, but, she takes it every night)    Vagus nerve stimulator battery 25-50%. Need to Interrogate vagus nerve stimulator device in clinic  in next 1-2 month with [facility] - she will establish care with therapist in June 2024. Continue working with talk therapist for self care        I spent 31 minutes in total today to provide comprehensive  medical care.   I spent 5 minutes writing the note and placing orders.   I spent 2 minutes  reviewing the chart.     The rest of the time was spent with the patient in face to face interview. During this time key medical decisions were made with review of medical chart prior to visit, visit with patient, counseling/education, and post visit work, including documentation of note on the day of  visit. I addressed all questions the patient/caregiver raised in regards to epilepsy or related medical questions.       Guadalupe Hernandez MD   Epilepsy Staff                 Answers submitted by the patient for this visit:  Patient Health Questionnaire (Submitted on 5/29/2024)  If you checked off any problems, how difficult have these problems made it for you to do your work, take care of things at home, or get along with other people?: Not difficult at all  PHQ9 TOTAL SCORE: 14

## 2024-06-25 NOTE — TELEPHONE ENCOUNTER
Patient with Paroxysmal (<7 days) atrial fibrillation which is controlled currently with Beta Blocker. Patient is currently in sinus rhythm.[id number] Score: 3. Anticoagulation indicated. Anticoagulation done with eliquis .   last appointment 04/21/17  next appointment 07/07/17

## 2024-08-01 ENCOUNTER — MYC MEDICAL ADVICE (OUTPATIENT)
Dept: NEUROLOGY | Facility: CLINIC | Age: 59
End: 2024-08-01

## 2024-08-01 ENCOUNTER — HOSPITAL ENCOUNTER (EMERGENCY)
Facility: CLINIC | Age: 59
Discharge: HOME | End: 2024-08-01
Attending: FAMILY MEDICINE | Admitting: FAMILY MEDICINE
Payer: COMMERCIAL

## 2024-08-01 VITALS
OXYGEN SATURATION: 100 % | TEMPERATURE: 97.6 F | HEART RATE: 76 BPM | SYSTOLIC BLOOD PRESSURE: 131 MMHG | DIASTOLIC BLOOD PRESSURE: 83 MMHG | RESPIRATION RATE: 15 BRPM

## 2024-08-01 DIAGNOSIS — G40.909 SEIZURE DISORDER (H): ICD-10-CM

## 2024-08-01 DIAGNOSIS — G40.909 RECURRENT SEIZURES (H): ICD-10-CM

## 2024-08-01 LAB
ALBUMIN SERPL BCG-MCNC: 4.9 G/DL (ref 3.5–5.2)
ALBUMIN UR-MCNC: NEGATIVE MG/DL
ALP SERPL-CCNC: 126 U/L (ref 40–150)
ALT SERPL W P-5'-P-CCNC: 19 U/L (ref 0–50)
ANION GAP SERPL CALCULATED.3IONS-SCNC: 11 MMOL/L (ref 7–15)
APPEARANCE UR: CLEAR
APTT PPP: 33 SECONDS (ref 22–38)
AST SERPL W P-5'-P-CCNC: 28 U/L (ref 0–45)
ATRIAL RATE - MUSE: 78 BPM
BASOPHILS # BLD AUTO: 0.1 10E3/UL (ref 0–0.2)
BASOPHILS NFR BLD AUTO: 1 %
BILIRUB SERPL-MCNC: <0.2 MG/DL
BILIRUB UR QL STRIP: NEGATIVE
BUN SERPL-MCNC: 12.4 MG/DL (ref 8–23)
CALCIUM SERPL-MCNC: 8.9 MG/DL (ref 8.8–10.4)
CARBAMAZEPINE SERPL-MCNC: 7.3 UG/ML (ref 4–12)
CHLORIDE SERPL-SCNC: 96 MMOL/L (ref 98–107)
COLOR UR AUTO: ABNORMAL
CREAT SERPL-MCNC: 0.6 MG/DL (ref 0.51–0.95)
DIASTOLIC BLOOD PRESSURE - MUSE: NORMAL MMHG
EGFRCR SERPLBLD CKD-EPI 2021: >90 ML/MIN/1.73M2
EOSINOPHIL # BLD AUTO: 0.3 10E3/UL (ref 0–0.7)
EOSINOPHIL NFR BLD AUTO: 2 %
ERYTHROCYTE [DISTWIDTH] IN BLOOD BY AUTOMATED COUNT: 12.7 % (ref 10–15)
GLUCOSE SERPL-MCNC: 110 MG/DL (ref 70–99)
GLUCOSE UR STRIP-MCNC: NEGATIVE MG/DL
HCO3 SERPL-SCNC: 26 MMOL/L (ref 22–29)
HCT VFR BLD AUTO: 38 % (ref 35–47)
HGB BLD-MCNC: 12.3 G/DL (ref 11.7–15.7)
HGB UR QL STRIP: NEGATIVE
IMM GRANULOCYTES # BLD: 0.1 10E3/UL
IMM GRANULOCYTES NFR BLD: 1 %
INR PPP: 0.96 (ref 0.85–1.15)
INTERPRETATION ECG - MUSE: NORMAL
KETONES UR STRIP-MCNC: NEGATIVE MG/DL
LACTATE SERPL-SCNC: 1 MMOL/L (ref 0.7–2)
LACTATE SERPL-SCNC: 2.2 MMOL/L (ref 0.7–2)
LEUKOCYTE ESTERASE UR QL STRIP: NEGATIVE
LEVETIRACETAM SERPL-MCNC: 24.1 ΜG/ML (ref 10–40)
LYMPHOCYTES # BLD AUTO: 2 10E3/UL (ref 0.8–5.3)
LYMPHOCYTES NFR BLD AUTO: 20 %
MAGNESIUM SERPL-MCNC: 2.3 MG/DL (ref 1.7–2.3)
MCH RBC QN AUTO: 30.2 PG (ref 26.5–33)
MCHC RBC AUTO-ENTMCNC: 32.4 G/DL (ref 31.5–36.5)
MCV RBC AUTO: 93 FL (ref 78–100)
MONOCYTES # BLD AUTO: 0.8 10E3/UL (ref 0–1.3)
MONOCYTES NFR BLD AUTO: 8 %
MUCOUS THREADS #/AREA URNS LPF: PRESENT /LPF
NEUTROPHILS # BLD AUTO: 7.1 10E3/UL (ref 1.6–8.3)
NEUTROPHILS NFR BLD AUTO: 68 %
NITRATE UR QL: NEGATIVE
NRBC # BLD AUTO: 0 10E3/UL
NRBC BLD AUTO-RTO: 0 /100
NT-PROBNP SERPL-MCNC: 1202 PG/ML (ref 0–900)
P AXIS - MUSE: 43 DEGREES
PH UR STRIP: 6.5 [PH] (ref 5–7)
PLATELET # BLD AUTO: 343 10E3/UL (ref 150–450)
POTASSIUM SERPL-SCNC: 3.8 MMOL/L (ref 3.4–5.3)
PR INTERVAL - MUSE: 162 MS
PROT SERPL-MCNC: 8.2 G/DL (ref 6.4–8.3)
QRS DURATION - MUSE: 122 MS
QT - MUSE: 434 MS
QTC - MUSE: 494 MS
R AXIS - MUSE: -65 DEGREES
RBC # BLD AUTO: 4.07 10E6/UL (ref 3.8–5.2)
RBC URINE: 1 /HPF
SODIUM SERPL-SCNC: 133 MMOL/L (ref 135–145)
SP GR UR STRIP: 1.01 (ref 1–1.03)
SYSTOLIC BLOOD PRESSURE - MUSE: NORMAL MMHG
T AXIS - MUSE: 28 DEGREES
TROPONIN T SERPL HS-MCNC: 7 NG/L
TSH SERPL DL<=0.005 MIU/L-ACNC: 2.03 UIU/ML (ref 0.3–4.2)
UROBILINOGEN UR STRIP-MCNC: NORMAL MG/DL
VENTRICULAR RATE- MUSE: 78 BPM
WBC # BLD AUTO: 10.2 10E3/UL (ref 4–11)
WBC URINE: <1 /HPF

## 2024-08-01 PROCEDURE — 83605 ASSAY OF LACTIC ACID: CPT | Performed by: FAMILY MEDICINE

## 2024-08-01 PROCEDURE — 96360 HYDRATION IV INFUSION INIT: CPT | Performed by: FAMILY MEDICINE

## 2024-08-01 PROCEDURE — 96361 HYDRATE IV INFUSION ADD-ON: CPT | Performed by: FAMILY MEDICINE

## 2024-08-01 PROCEDURE — 93010 ELECTROCARDIOGRAM REPORT: CPT | Performed by: FAMILY MEDICINE

## 2024-08-01 PROCEDURE — 84484 ASSAY OF TROPONIN QUANT: CPT | Performed by: FAMILY MEDICINE

## 2024-08-01 PROCEDURE — 80156 ASSAY CARBAMAZEPINE TOTAL: CPT | Performed by: FAMILY MEDICINE

## 2024-08-01 PROCEDURE — 36415 COLL VENOUS BLD VENIPUNCTURE: CPT | Performed by: FAMILY MEDICINE

## 2024-08-01 PROCEDURE — 80177 DRUG SCRN QUAN LEVETIRACETAM: CPT | Performed by: FAMILY MEDICINE

## 2024-08-01 PROCEDURE — 99284 EMERGENCY DEPT VISIT MOD MDM: CPT | Performed by: FAMILY MEDICINE

## 2024-08-01 PROCEDURE — 99284 EMERGENCY DEPT VISIT MOD MDM: CPT | Mod: 25 | Performed by: FAMILY MEDICINE

## 2024-08-01 PROCEDURE — 83735 ASSAY OF MAGNESIUM: CPT | Performed by: FAMILY MEDICINE

## 2024-08-01 PROCEDURE — 84443 ASSAY THYROID STIM HORMONE: CPT | Performed by: FAMILY MEDICINE

## 2024-08-01 PROCEDURE — 258N000003 HC RX IP 258 OP 636: Performed by: FAMILY MEDICINE

## 2024-08-01 PROCEDURE — 85730 THROMBOPLASTIN TIME PARTIAL: CPT | Performed by: FAMILY MEDICINE

## 2024-08-01 PROCEDURE — 81003 URINALYSIS AUTO W/O SCOPE: CPT | Performed by: FAMILY MEDICINE

## 2024-08-01 PROCEDURE — 93005 ELECTROCARDIOGRAM TRACING: CPT | Performed by: FAMILY MEDICINE

## 2024-08-01 PROCEDURE — 80183 DRUG SCRN QUANT OXCARBAZEPIN: CPT | Performed by: FAMILY MEDICINE

## 2024-08-01 PROCEDURE — 83880 ASSAY OF NATRIURETIC PEPTIDE: CPT | Performed by: FAMILY MEDICINE

## 2024-08-01 PROCEDURE — 80053 COMPREHEN METABOLIC PANEL: CPT | Performed by: FAMILY MEDICINE

## 2024-08-01 PROCEDURE — 85610 PROTHROMBIN TIME: CPT | Performed by: FAMILY MEDICINE

## 2024-08-01 PROCEDURE — 85004 AUTOMATED DIFF WBC COUNT: CPT | Performed by: FAMILY MEDICINE

## 2024-08-01 RX ADMIN — SODIUM CHLORIDE 1000 ML: 9 INJECTION, SOLUTION INTRAVENOUS at 14:58

## 2024-08-01 ASSESSMENT — COLUMBIA-SUICIDE SEVERITY RATING SCALE - C-SSRS
6. HAVE YOU EVER DONE ANYTHING, STARTED TO DO ANYTHING, OR PREPARED TO DO ANYTHING TO END YOUR LIFE?: NO
1. IN THE PAST MONTH, HAVE YOU WISHED YOU WERE DEAD OR WISHED YOU COULD GO TO SLEEP AND NOT WAKE UP?: NO
2. HAVE YOU ACTUALLY HAD ANY THOUGHTS OF KILLING YOURSELF IN THE PAST MONTH?: NO

## 2024-08-01 ASSESSMENT — ACTIVITIES OF DAILY LIVING (ADL)
ADLS_ACUITY_SCORE: 35

## 2024-08-01 NOTE — ED PROVIDER NOTES
ED Provider Note  [facility]      History     Chief Complaint   Patient presents with    Seizures     HPI  Mili Mane is a 59 year old female with a past medical history significant for epilepsy who presents to the Emergency Department for evaluation of seizures.  Patient has history of seizure disorder with approximately 10 seizures a month and a minor she lives in assisted living.  Patient been compliant with medication has not been sleep deprived no infections no headache etc.  Patient this morning noted an aura that she is can have another when these smaller seizures and sat down witnessed at her assisted living to have this did not lose control bowel or bladder otherwise.  No trauma reported.  Brought to the ER for evaluation patient felt that this was very similar to previous and did not feel she needed to come to the ER.  Here in the ER denies headache no neurological changes otherwise vitally stable no chest pain palpitations etc.  No nausea vomiting has not missed any doses etc.            Physical Exam   BP: 131/83  Pulse: 76  Temp: 97.6  F (36.4  C)  Resp: 15  SpO2: 100 %  Physical Exam  Vitals and nursing note reviewed.   Constitutional:       General: She is not in acute distress.     Appearance: Normal appearance. She is well-developed. She is not ill-appearing or toxic-appearing.      Comments: Patient is alert and oriented x 3 here is not postictal otherwise cooperative to give history vitally stable neurologically intact   HENT:      Head: Normocephalic and atraumatic.      Mouth/Throat:      Mouth: Mucous membranes are moist.      Pharynx: Oropharynx is clear.      Comments: Did not bite tongue  Eyes:      General: No scleral icterus.     Extraocular Movements: Extraocular movements intact.      Conjunctiva/sclera: Conjunctivae normal.      Pupils: Pupils are equal, round, and reactive to light.      Comments: Fields are intact   Cardiovascular:      Rate and  Rhythm: Normal rate and regular rhythm.   Pulmonary:      Effort: Pulmonary effort is normal. No respiratory distress.      Breath sounds: No stridor.   Abdominal:      General: Abdomen is flat. There is no distension.      Palpations: Abdomen is soft.      Tenderness: There is no abdominal tenderness. There is no guarding.   Musculoskeletal:         General: No swelling or tenderness.      Cervical back: Normal range of motion and neck supple.   Skin:     General: Skin is warm and dry.      Capillary Refill: Capillary refill takes less than 2 seconds.      Coloration: Skin is not jaundiced or pale.      Findings: No rash.   Neurological:      General: No focal deficit present.      Mental Status: She is alert and oriented to person, place, and time. Mental status is at baseline.   Psychiatric:      Comments: Appropriate here in the ER.           ED Course, Procedures, & Data      [place] [facility].  Patient follows up with Dr. Hernandez with neurology for seizure disorder.  Medication reviewed allergies reviewed.    In the ER patient evaluated.  IV established labs drawn.  Patient received a liter normal saline bolus in the ER.  No indications for any imaging at this point neurologically intact no headache seems typical of previous seizures.  Labs revealed EKG done without any acute ischemic changes.  Lactic acid initially was 2.2 I believe repeat was 1 BNP was 1200 but troponin negative.  Keppra level normal at 24.1.  TSH 2.03.  Carbamazepine level at 7.3.  Glucose noted be 110.  Other electrolytes stable with exception of sodium 133.    Patient in the ER is evaluated has had no recurrent symptoms otherwise feels fine discussed with neurology also at this point they reviewed the case no need for giving any extra antiepileptics patient takes her medications twice a day is able to return presuming that this is just a normal minor breakthrough seizure that she typically has patient agrees at this point we have arranged  transportation back to her assisted living continuing her medications and following up with Dr. Hernandez and return if any concerns at all.  Procedures            EKG Interpretation:      Interpreted by Elver Bruce MD  Time reviewed: 14:49  Symptoms at time of EKG: Seizures  Rhythm: normal sinus   Rate: Normal  Axis: Left Axis Deviation  Ectopy: none  Conduction: left bundle branch block   ST Segments/ T Waves: No ST-T wave changes  Q Waves: none  Comparison to prior: Unchanged from 6/6/17    Clinical Impression: normal sinus rhythm and left bundle branch block          The Lactic acid level is elevated due to seizure, at this time there is no sign of severe sepsis or septic shock.  Elevated Lactate of 2.2 and no sepsis note found - Delete this reminder and enter the sepsis note or '.edcms' before signing chart.>>>     Results for orders placed or performed during the hospital encounter of 08/01/24   Partial thromboplastin time     Status: Normal   Result Value Ref Range    aPTT 33 22 - 38 Seconds   INR     Status: Normal   Result Value Ref Range    INR 0.96 0.85 - 1.15   Comprehensive metabolic panel     Status: Abnormal   Result Value Ref Range    Sodium 133 (L) 135 - 145 mmol/L    Potassium 3.8 3.4 - 5.3 mmol/L    Carbon Dioxide (CO2) 26 22 - 29 mmol/L    Anion Gap 11 7 - 15 mmol/L    Urea Nitrogen 12.4 8.0 - 23.0 mg/dL    Creatinine 0.60 0.51 - 0.95 mg/dL    GFR Estimate >90 >60 mL/min/1.73m2    Calcium 8.9 8.8 - 10.4 mg/dL    Chloride 96 (L) 98 - 107 mmol/L    Glucose 110 (H) 70 - 99 mg/dL    Alkaline Phosphatase 126 40 - 150 U/L    AST 28 0 - 45 U/L    ALT 19 0 - 50 U/L    Protein Total 8.2 6.4 - 8.3 g/dL    Albumin 4.9 3.5 - 5.2 g/dL    Bilirubin Total <0.2 <=1.2 mg/dL   Magnesium     Status: Normal   Result Value Ref Range    Magnesium 2.3 1.7 - 2.3 mg/dL   Lactic Acid Whole Blood with 1X Repeat in 2 HR when >2     Status: Abnormal   Result Value Ref Range    Lactic Acid, Initial 2.2 (H) 0.7 - 2.0 mmol/L    Troponin T, High Sensitivity     Status: Normal   Result Value Ref Range    Troponin T, High Sensitivity 7 <=14 ng/L   Nt probnp inpatient (BNP)     Status: Abnormal   Result Value Ref Range    N terminal Pro BNP Inpatient 1,202 (H) 0 - 900 pg/mL   TSH     Status: Normal   Result Value Ref Range    TSH 2.03 0.30 - 4.20 uIU/mL   UA with Microscopic reflex to Culture     Status: Abnormal    Specimen: Urine, Midstream   Result Value Ref Range    Color Urine Straw Colorless, Straw, Light Yellow, Yellow    Appearance Urine Clear Clear    Glucose Urine Negative Negative mg/dL    Bilirubin Urine Negative Negative    Ketones Urine Negative Negative mg/dL    Specific Gravity Urine 1.009 1.003 - 1.035    Blood Urine Negative Negative    pH Urine 6.5 5.0 - 7.0    Protein Albumin Urine Negative Negative mg/dL    Urobilinogen Urine Normal Normal, 2.0 mg/dL    Nitrite Urine Negative Negative    Leukocyte Esterase Urine Negative Negative    Mucus Urine Present (A) None Seen /LPF    RBC Urine 1 <=2 /HPF    WBC Urine <1 <=5 /HPF    Narrative    Urine Culture not indicated   Keppra (Levetiracetam) Level     Status: Normal   Result Value Ref Range    Keppra (Levetiracetam) Level 24.1 10.0 - 40.0  g/mL   Carbamazepine total     Status: Normal   Result Value Ref Range    Carbamazepine 7.3 4.0 - 12.0 ug/mL   CBC with platelets and differential     Status: None   Result Value Ref Range    WBC Count 10.2 4.0 - 11.0 10e3/uL    RBC Count 4.07 3.80 - 5.20 10e6/uL    Hemoglobin 12.3 11.7 - 15.7 g/dL    Hematocrit 38.0 35.0 - 47.0 %    MCV 93 78 - 100 fL    MCH 30.2 26.5 - 33.0 pg    MCHC 32.4 31.5 - 36.5 g/dL    RDW 12.7 10.0 - 15.0 %    Platelet Count 343 150 - 450 10e3/uL    % Neutrophils 68 %    % Lymphocytes 20 %    % Monocytes 8 %    % Eosinophils 2 %    % Basophils 1 %    % Immature Granulocytes 1 %    NRBCs per 100 WBC 0 <1 /100    Absolute Neutrophils 7.1 1.6 - 8.3 10e3/uL    Absolute Lymphocytes 2.0 0.8 - 5.3 10e3/uL    Absolute  Monocytes 0.8 0.0 - 1.3 10e3/uL    Absolute Eosinophils 0.3 0.0 - 0.7 10e3/uL    Absolute Basophils 0.1 0.0 - 0.2 10e3/uL    Absolute Immature Granulocytes 0.1 <=0.4 10e3/uL    Absolute NRBCs 0.0 10e3/uL   Lactic Acid Whole Blood     Status: Normal   Result Value Ref Range    Lactic Acid 1.0 0.7 - 2.0 mmol/L   EKG 12-lead, tracing only     Status: None   Result Value Ref Range    Systolic Blood Pressure  mmHg    Diastolic Blood Pressure  mmHg    Ventricular Rate 78 BPM    Atrial Rate 78 BPM    VT Interval 162 ms    QRS Duration 122 ms     ms    QTc 494 ms    P Axis 43 degrees    R AXIS -65 degrees    T Axis 28 degrees    Interpretation ECG       Sinus rhythm  Possible Left atrial enlargement  Left axis deviation  Left bundle branch block  Abnormal ECG  Unconfirmed report - interpretation of this ECG is computer generated - see medical record for final interpretation  Confirmed by - EMERGENCY ROOM, PHYSICIAN ([id number]),  JOSIE GRIJALVA ([id number]) on 8/1/2024 8:04:29 PM     CBC with platelets differential     Status: None    Narrative    The following orders were created for panel order CBC with platelets differential.  Procedure                               Abnormality         Status                     ---------                               -----------         ------                     CBC with platelets and d...[[id number]]                      Final result                 Please view results for these tests on the individual orders.     Medications   sodium chloride 0.9% BOLUS 1,000 mL (0 mLs Intravenous Stopped 8/1/24 1654)     Labs Ordered and Resulted from Time of ED Arrival to Time of ED Departure   COMPREHENSIVE METABOLIC PANEL - Abnormal       Result Value    Sodium 133 (*)     Potassium 3.8      Carbon Dioxide (CO2) 26      Anion Gap 11      Urea Nitrogen 12.4      Creatinine 0.60      GFR Estimate >90      Calcium 8.9      Chloride 96 (*)     Glucose 110 (*)     Alkaline Phosphatase 126      AST 28       ALT 19      Protein Total 8.2      Albumin 4.9      Bilirubin Total <0.2     LACTIC ACID WHOLE BLOOD WITH 1X REPEAT IN 2 HR WHEN >2 - Abnormal    Lactic Acid, Initial 2.2 (*)    NT PROBNP INPATIENT - Abnormal    N terminal Pro BNP Inpatient 1,202 (*)    ROUTINE UA WITH MICROSCOPIC REFLEX TO CULTURE - Abnormal    Color Urine Straw      Appearance Urine Clear      Glucose Urine Negative      Bilirubin Urine Negative      Ketones Urine Negative      Specific Gravity Urine 1.009      Blood Urine Negative      pH Urine 6.5      Protein Albumin Urine Negative      Urobilinogen Urine Normal      Nitrite Urine Negative      Leukocyte Esterase Urine Negative      Mucus Urine Present (*)     RBC Urine 1      WBC Urine <1     PARTIAL THROMBOPLASTIN TIME - Normal    aPTT 33     INR - Normal    INR 0.96     MAGNESIUM - Normal    Magnesium 2.3     TROPONIN T, HIGH SENSITIVITY - Normal    Troponin T, High Sensitivity 7     TSH - Normal    TSH 2.03     KEPPRA (LEVETIRACETAM) LEVEL - Normal    Keppra (Levetiracetam) Level 24.1     CARBAMAZEPINE TOTAL - Normal    Carbamazepine 7.3     LACTIC ACID WHOLE BLOOD - Normal    Lactic Acid 1.0     CBC WITH PLATELETS AND DIFFERENTIAL    WBC Count 10.2      RBC Count 4.07      Hemoglobin 12.3      Hematocrit 38.0      MCV 93      MCH 30.2      MCHC 32.4      RDW 12.7      Platelet Count 343      % Neutrophils 68      % Lymphocytes 20      % Monocytes 8      % Eosinophils 2      % Basophils 1      % Immature Granulocytes 1      NRBCs per 100 WBC 0      Absolute Neutrophils 7.1      Absolute Lymphocytes 2.0      Absolute Monocytes 0.8      Absolute Eosinophils 0.3      Absolute Basophils 0.1      Absolute Immature Granulocytes 0.1      Absolute NRBCs 0.0     OXCARBAZEPINE LEVEL     No orders to display          Critical care was not performed.     Medical Decision Making  The patient's presentation was of moderate complexity (a chronic illness mild to moderate exacerbation, progression, or  side effect of treatment).    The patient's evaluation involved:  review of external note(s) from 3+ sources (see separate area of note for details)  review of 3+ test result(s) ordered prior to this encounter (see separate area of note for details)  ordering and/or review of 3+ test(s) in this encounter (see separate area of note for details)  discussion of management or test interpretation with another health professional (see separate area of note for details)    The patient's management necessitated high risk (a decision regarding hospitalization).    Assessment & Plan   59-year-old female with history of known seizure disorder presents to the ER with her typical minor seizure.  No sign of injury otherwise no neurological focal findings no headache no concerns for imaging cardiac workup otherwise stable.  Patient otherwise been fine levels are normal also most likely typical breakthrough seizure.  Patient after discussion with neurology is comfortable going to discharge her back to assisted living resuming her medications following up with her outpatient neurologist and return if any concerns.       I have reviewed the nursing notes. I have reviewed the findings, diagnosis, plan and need for follow up with the patient.    Discharge Medication List as of 8/1/2024  6:18 PM          Final diagnoses:   Seizure disorder (H)   Recurrent seizures (H)       Elver Bruce MD  [facility] EMERGENCY DEPARTMENT  8/1/2024    This note was created at least in part by the use of dragon voice dictation system. Inadvertent typographical errors may still exist.  Elver Bruce MD.  Patient evaluated in the emergency department during the COVID-19 pandemic period. Careful attention to patients safety was addressed throughout the evaluation. Evaluation and treatment management was initiated with disposition made efficiently and appropriate as possible to minimize any risk of potential exposure to patient during this  evaluation.             Elver Bruce MD  08/01/24 [id number]

## 2024-08-01 NOTE — PROGRESS NOTES
Care Management Discharge Note    Discharge Date:  5/7/2024       Discharge Disposition:  penitentiary    Discharge Services:  Transportation      Discharge DME:  none    Discharge Transportation:  medical ride    Private pay costs discussed: Not applicable    Does the patient's insurance plan have a 3 day qualifying hospital stay waiver?  No    PAS Confirmation Code:  N/A  Patient/family educated on Medicare website which has current facility and service quality ratings:  No    Education Provided on the Discharge Plan:  no   Persons Notified of Discharge Plans: patient, nurse  Patient/Family in Agreement with the Plan:  yes    Handoff Referral Completed: No    Additional Information:    SIRIA informed by triage nurse that patient is ready to discharge back to her penitentiary. Patient's contacts were already attempted by nurse to coordinate ride.     SW set up ride through patient's insurance who booked ride through [employer] company. Patient told SW that her penitentiary ([facility]) locked their doors at 6:20pm and as patient does not have her phone on her, needed help communicating with them that she would be back past that time. SW attempted to reach staff there several times and was unsuccessful. SW left message and informed patient that she could not make contact with [facility].    ADDENDUM 1850: SW received call from facility who confirmed patient made it back safely.     [facility]  [address]  [phone] and [phone]    Tigist Arceo, EKTA   8/1/2024       Social Work and Care Management Department       SEARCHABLE in [employer] - search SOCIAL WORK       [place] ([phone]) Saturday and Sunday     Units: 4A Vocera, 4C Vocera, & 4E Vocera        Units: 5A 1560-1825 Vocera, 5A 3812-3259 Vocera , BMT SW 1 BMT SW 2, BMT SW 3 & BMT SW 4  5C Off Service 5401 - 5416  5C Off Service [phone]     Units: 6A Vocera & 6B Vocera      Units: 6C Vocera     Units: 7A  Vocera & 7B Vocera      Units: 7C Med Surg 7401 thru 7418 and 7C Med Surg 7502 thru 7521      Unit: [facility] ED Vocera & [facility] Obs Vocera     [facility] ([phone]) Saturday and Sunday      Units: 5 Ortho Vocera, 5 Med Surg Vocera & WB ED Vocera     Units: 6 Med Surg Vocera, 8 Med Surg Vocera, & 10 ICU Vocera      After hours Vocera [facility] and After Hours Vocera [place]     Saturday & Sunday (1630 - 0000)    Mon-Fri ([id number])     [facility] Recognized Holidays  ([phone])    Units: ALL   - see above VOCERA links to units and after hours

## 2024-08-01 NOTE — ED TRIAGE NOTES
"BIBA for seizure that happened about 1 pm today.  Pt lives at an independent living facility and states \" I have about 5-10 minor seizures a month, so I am not sure why they wanted me to come to the ER\"   Pt takes Keppra and Ativan daily.  Pt is able to know when she going to have a seizure \"preceded by an aura, so I sit down\".  Pt states that's what I did today, but they still called Ems.      Triage Assessment (Adult)       Row Name 08/01/24 2111          Triage Assessment    Airway WDL WDL        Respiratory WDL    Respiratory WDL WDL        Skin Circulation/Temperature WDL    Skin Circulation/Temperature WDL WDL        Cardiac WDL    Cardiac WDL WDL        Peripheral/Neurovascular WDL    Peripheral Neurovascular WDL WDL        Cognitive/Neuro/Behavioral WDL    Cognitive/Neuro/Behavioral WDL WDL                     "

## 2024-08-01 NOTE — DISCHARGE INSTRUCTIONS
Home.  Continue current medications, make sure to take your medications as scheduled tonight.  Follow up with Dr. Hernandez neurology.  Return if any concerns at all.  Discussed with neurology and agree.    Results for orders placed or performed during the hospital encounter of 08/01/24   Partial thromboplastin time     Status: Normal   Result Value Ref Range    aPTT 33 22 - 38 Seconds   INR     Status: Normal   Result Value Ref Range    INR 0.96 0.85 - 1.15   Comprehensive metabolic panel     Status: Abnormal   Result Value Ref Range    Sodium 133 (L) 135 - 145 mmol/L    Potassium 3.8 3.4 - 5.3 mmol/L    Carbon Dioxide (CO2) 26 22 - 29 mmol/L    Anion Gap 11 7 - 15 mmol/L    Urea Nitrogen 12.4 8.0 - 23.0 mg/dL    Creatinine 0.60 0.51 - 0.95 mg/dL    GFR Estimate >90 >60 mL/min/1.73m2    Calcium 8.9 8.8 - 10.4 mg/dL    Chloride 96 (L) 98 - 107 mmol/L    Glucose 110 (H) 70 - 99 mg/dL    Alkaline Phosphatase 126 40 - 150 U/L    AST 28 0 - 45 U/L    ALT 19 0 - 50 U/L    Protein Total 8.2 6.4 - 8.3 g/dL    Albumin 4.9 3.5 - 5.2 g/dL    Bilirubin Total <0.2 <=1.2 mg/dL   Magnesium     Status: Normal   Result Value Ref Range    Magnesium 2.3 1.7 - 2.3 mg/dL   Lactic Acid Whole Blood with 1X Repeat in 2 HR when >2     Status: Abnormal   Result Value Ref Range    Lactic Acid, Initial 2.2 (H) 0.7 - 2.0 mmol/L   Troponin T, High Sensitivity     Status: Normal   Result Value Ref Range    Troponin T, High Sensitivity 7 <=14 ng/L   Nt probnp inpatient (BNP)     Status: Abnormal   Result Value Ref Range    N terminal Pro BNP Inpatient 1,202 (H) 0 - 900 pg/mL   TSH     Status: Normal   Result Value Ref Range    TSH 2.03 0.30 - 4.20 uIU/mL   UA with Microscopic reflex to Culture     Status: Abnormal    Specimen: Urine, Midstream   Result Value Ref Range    Color Urine Straw Colorless, Straw, Light Yellow, Yellow    Appearance Urine Clear Clear    Glucose Urine Negative Negative mg/dL    Bilirubin Urine Negative Negative    Ketones  Urine Negative Negative mg/dL    Specific Gravity Urine 1.009 1.003 - 1.035    Blood Urine Negative Negative    pH Urine 6.5 5.0 - 7.0    Protein Albumin Urine Negative Negative mg/dL    Urobilinogen Urine Normal Normal, 2.0 mg/dL    Nitrite Urine Negative Negative    Leukocyte Esterase Urine Negative Negative    Mucus Urine Present (A) None Seen /LPF    RBC Urine 1 <=2 /HPF    WBC Urine <1 <=5 /HPF    Narrative    Urine Culture not indicated   Keppra (Levetiracetam) Level     Status: Normal   Result Value Ref Range    Keppra (Levetiracetam) Level 24.1 10.0 - 40.0  g/mL   Carbamazepine total     Status: Normal   Result Value Ref Range    Carbamazepine 7.3 4.0 - 12.0 ug/mL   CBC with platelets and differential     Status: None   Result Value Ref Range    WBC Count 10.2 4.0 - 11.0 10e3/uL    RBC Count 4.07 3.80 - 5.20 10e6/uL    Hemoglobin 12.3 11.7 - 15.7 g/dL    Hematocrit 38.0 35.0 - 47.0 %    MCV 93 78 - 100 fL    MCH 30.2 26.5 - 33.0 pg    MCHC 32.4 31.5 - 36.5 g/dL    RDW 12.7 10.0 - 15.0 %    Platelet Count 343 150 - 450 10e3/uL    % Neutrophils 68 %    % Lymphocytes 20 %    % Monocytes 8 %    % Eosinophils 2 %    % Basophils 1 %    % Immature Granulocytes 1 %    NRBCs per 100 WBC 0 <1 /100    Absolute Neutrophils 7.1 1.6 - 8.3 10e3/uL    Absolute Lymphocytes 2.0 0.8 - 5.3 10e3/uL    Absolute Monocytes 0.8 0.0 - 1.3 10e3/uL    Absolute Eosinophils 0.3 0.0 - 0.7 10e3/uL    Absolute Basophils 0.1 0.0 - 0.2 10e3/uL    Absolute Immature Granulocytes 0.1 <=0.4 10e3/uL    Absolute NRBCs 0.0 10e3/uL   Lactic Acid Whole Blood     Status: Normal   Result Value Ref Range    Lactic Acid 1.0 0.7 - 2.0 mmol/L   EKG 12-lead, tracing only     Status: None (Preliminary result)   Result Value Ref Range    Systolic Blood Pressure  mmHg    Diastolic Blood Pressure  mmHg    Ventricular Rate 78 BPM    Atrial Rate 78 BPM    MI Interval 162 ms    QRS Duration 122 ms     ms    QTc 494 ms    P Axis 43 degrees    R AXIS -65  degrees    T Axis 28 degrees    Interpretation ECG       Sinus rhythm  Possible Left atrial enlargement  Left axis deviation  Left bundle branch block  Abnormal ECG     CBC with platelets differential     Status: None    Narrative    The following orders were created for panel order CBC with platelets differential.  Procedure                               Abnormality         Status                     ---------                               -----------         ------                     CBC with platelets and d...[[id number]]                      Final result                 Please view results for these tests on the individual orders.

## 2024-08-04 ENCOUNTER — TELEPHONE (OUTPATIENT)
Dept: NURSING | Facility: CLINIC | Age: 59
End: 2024-08-04
Payer: COMMERCIAL

## 2024-08-04 LAB — 10OH-CARBAZEPINE SERPL-MCNC: <1 UG/ML

## 2024-08-04 NOTE — TELEPHONE ENCOUNTER
[facility] ([place])    Reason for call: Lab Result Notification     Lab Result (including Rx patient on, if applicable).  If culture, copy of lab report at bottom.  Lab Result: Seizure medication levels    Component      Latest Ref Rng 8/1/2024  2:58 PM   Oxcarb or Eslicarb Metabolite (MHD)      3 - 35 ug/mL <1 (L)       Legend:  (L) Low    *Per chart review from visit with neurology on 5/29/24, patient began a taper to wean off oxcarbazepine in 5 weeks    Creatinine Level (mg/dl)   Creatinine   Date Value Ref Range Status   08/01/2024 0.60 0.51 - 0.95 mg/dL Final   09/27/2019 0.52 0.52 - 1.04 mg/dL Final    Creatinine clearance (ml/min), if applicable    Serum creatinine: 0.6 mg/dL 08/01/24 1458  Estimated creatinine clearance: 86.9 mL/min     ED Symptoms: Patient with known history of seizure disorder presented to [facility] ED on 8/1/24 for evaluation of seizures.    RN Recommendations/Instructions per [facility] ED lab result protocol:   [facility] ED lab result protocol utilized: Misc Labs protocol - Seizure medication levels    Unable to reach patient/caregiver. Mailbox is full and unable to leave a message.     Letter pended to be sent via [employer]. Will also forward results to neurologist, Dr. Hernandez.     Pooja Teresa RN

## 2024-08-06 ENCOUNTER — OFFICE VISIT (OUTPATIENT)
Dept: FAMILY MEDICINE | Facility: CLINIC | Age: 59
End: 2024-08-06
Payer: COMMERCIAL

## 2024-08-06 VITALS
HEIGHT: 62 IN | BODY MASS INDEX: 27.05 KG/M2 | DIASTOLIC BLOOD PRESSURE: 90 MMHG | TEMPERATURE: 98 F | OXYGEN SATURATION: 98 % | RESPIRATION RATE: 12 BRPM | WEIGHT: 147 LBS | HEART RATE: 82 BPM | SYSTOLIC BLOOD PRESSURE: 150 MMHG

## 2024-08-06 DIAGNOSIS — I10 PRIMARY HYPERTENSION: ICD-10-CM

## 2024-08-06 DIAGNOSIS — H61.22 EXCESSIVE EAR WAX, LEFT: Primary | ICD-10-CM

## 2024-08-06 PROCEDURE — 69209 REMOVE IMPACTED EAR WAX UNI: CPT | Mod: LT | Performed by: FAMILY MEDICINE

## 2024-08-06 PROCEDURE — 99203 OFFICE O/P NEW LOW 30 MIN: CPT | Mod: 25 | Performed by: FAMILY MEDICINE

## 2024-08-06 NOTE — PROGRESS NOTES
"  Assessment & Plan     Excessive ear wax, left  I used warm water irrigation, lavage cleaned had left ear, she felt better.  No complication    Primary hypertension    Blood pressure at the higher side, she denies headache or chest pain.  She reports  it does go up when she comes to the clinic or doctor office.  I did advise patient to monitor blood pressure outside the clinic for the next week or so, and follow-up with her primary care physician  Continue with current medicine    BMI  Estimated body mass index is 26.89 kg/m  as calculated from the following:    Height as of this encounter: 1.575 m (5' 2\").    Weight as of this encounter: 66.7 kg (147 lb).   Weight management plan: Discussed healthy diet and exercise guidelines      Work on weight loss  Regular exercise    Norberto Garces is a 59 year old, presenting for the following health issues:  Ear Problem  She has been having decreased hearing from the left ear, she thinks she may have wax.    History of hypertension she is on atenolol, no chest pain, no shortness of breath,        8/6/2024     3:00 PM   Additional Questions   Roomed by Clarice CAICEDO MA   Accompanied by Jennie Norton     History of Present Illness       Reason for visit:  Ear wax removal    She eats 0-1 servings of fruits and vegetables daily.She consumes 3 sweetened beverage(s) daily.She exercises with enough effort to increase her heart rate 9 or less minutes per day.  She exercises with enough effort to increase her heart rate 3 or less days per week.   She is taking medications regularly.     Pt thinks both ears are full. Hard to do it at home.   Drainage out of both ears.        Review of Systems  Constitutional, HEENT, cardiovascular, pulmonary, gi and gu systems are negative, except as otherwise noted.      Objective    BP (!) 161/93 (BP Location: Right arm, Patient Position: Chair, Cuff Size: Adult Regular)   Pulse 82   Temp 98  F (36.7  C) (Oral)   Resp 12   Ht 1.575 m (5' 2\")  "  Wt 66.7 kg (147 lb)   LMP  (LMP Unknown)   SpO2 98%   Breastfeeding No   BMI 26.89 kg/m    Body mass index is 26.89 kg/m .  Physical Exam   GENERAL: alert and no distress  HENT: normal cephalic/atraumatic, right ear: normal: no effusions, no erythema, normal landmarks, and left ear: occluded with wax  S/P: ear lavage irrigation, normal ear canal and TM          Signed Electronically by: Joseph Garcia MD

## 2024-08-21 ENCOUNTER — OFFICE VISIT (OUTPATIENT)
Dept: NEUROLOGY | Facility: CLINIC | Age: 59
End: 2024-08-21
Payer: COMMERCIAL

## 2024-08-21 VITALS
DIASTOLIC BLOOD PRESSURE: 91 MMHG | HEART RATE: 72 BPM | OXYGEN SATURATION: 98 % | SYSTOLIC BLOOD PRESSURE: 173 MMHG | TEMPERATURE: 97.8 F

## 2024-08-21 DIAGNOSIS — G40.209 PARTIAL SYMPTOMATIC EPILEPSY WITH COMPLEX PARTIAL SEIZURES, NOT INTRACTABLE, WITHOUT STATUS EPILEPTICUS (H): Primary | ICD-10-CM

## 2024-08-21 RX ORDER — LEVETIRACETAM 500 MG/1
TABLET ORAL
Qty: 630 TABLET | Refills: 3 | Status: ON HOLD | OUTPATIENT
Start: 2024-08-21

## 2024-08-21 ASSESSMENT — PAIN SCALES - GENERAL: PAINLEVEL: NO PAIN (0)

## 2024-08-21 NOTE — PROGRESS NOTES
"[facility] Epilepsy Care Progress Note      Patient:  Mili Mane  :  1965   Age:  59 year old   Today's Office Visit:  2024  Chief complaint: Follow up seizures    Epilepsy Data:  Her seizure started at age 18 months, early on she had \"petit mal\" which continued to age 11. When she was born she had \" some oxygen issues at birth, then as infant she had recurrent fever\". Early childhood she was on phenobarbital, phenytoin, mysoline and she continued to have starting spells several time per week. It seems over the years she has tried several seizure medications but continues to have high seizure burden. Her recurrent spells seem consistent with focal impaired seizures. She followed with Dr. Kendall for 33 years.      Seizure types:   Seizure type 1: focal seizures with impairment in awareness  - Aura of ringing in ear, stares, last 30 seconds, started 18 months. Per Patricia she has a \"blank stare for minutes\". No automatisms. She calls these \"small\" she describes them as \"I have loss of awareness, I do not get hurt, and I do not fall.   Seizure type 2: generalized tonic-clonic convulsion - whole body stiffening and shaking, she bites her cheek. Last \"hard seizure\" was 2021. In 2021 she was walking her dog, she fell and was confused.   Seizure type 3: She is aware it is coming on from a vague sound. Crouches down and has head jerking to the right side. She has had multiple of these over the last few months. She states she has had this in the shower before. She has impaired consciousness. There can be loss of bladder control, and biting of the cheek or tongue, as well as drooling. They can last five minutes in duration. She can be groggy and confused for around an hour.         Past AEDs:    Levetiracetam - started in . She is not sure if it helps her seizures.   Carbatrol - started at age 1.   Celontin - \"thorat starting closing and had to go to hospital and placed on " "breathing\"  Phenytoin  - rash with phenytoin    Mysoline   Phenobarbital   Felbamate - did not work per her memory.   Lacosamide in 2014  - she had persistent tachycardia  Fycompa - fatigue up to 4mg daily, higher dosing not suspected to provide benefit  Oxcarbazepine - up to 450-450, worsening seizures, worsening mood, mild hyponatremia    History of Present Illness:   Ms. Mane was last seen by Dr. Hernandez on 5/29/2024. 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 She was medically refractory, with worsening seizures, worsening mood, and mild hyponatremia on oxcarbazepine, which she was to wean off. She was to continue levetiracetam 6380-6814, carbatrol 400-400, cobalt oral suspension 1200mg of CBD and 60mg THC 3mL-3mL, tangerine oral suspension with THC 240mg and traces of CBD 3mL at noon, and lorazepam 0.5mg at night.     Today, Ms. Mane presents with her niece. She had a typical breakthrough seizure earlier this month. She declined going to the ED three or four times before she became concerned she missed something and accepted the transport. She continues to have 10 seizures per month on average.    They are concerned as she has had gaps in time that she has realized are there. She has approximately 8 days where she does not remember people asking if she desires assistance with showering at her assisted living, and is concerned she may be having more seizures than she is aware of. Prolonged EEG monitoring was discussed, ambulatory versus inpatient video EEG monitoring.    She denies any side effects with her current medications. She has not been on higher doses of levetiracetam before that she is aware of with her levels most consistently in the 20's. She has been on higher doses of carbatrol, and had issues with this per her recollection.     Her VNS battery life remains at 25-50%.     She remains interested in surgical intervention for her " epilepsy. DBS and RNS have been discussed with Dr. Hernandez. They do have an appointment for transition of care with Dr. Gillespie coming up.     She has restarted talk therapy, and has found a therapist that has been a good fit.     She saw PCP for BP, who wanted her to check her blood pressure outside of doctors visits.     Current Outpatient Medications   Medication Sig Dispense Refill    atenolol (TENORMIN) 25 MG tablet Take 25 mg by mouth every evening      atenolol (TENORMIN) 50 MG tablet Take 50 mg by mouth every morning      CARBATROL 200 MG 12 hr capsule TAKE TWO CAPSULES BY MOUTH EVERY MORNING AND TAKE TWO CAPSULES BY MOUTH EVERY EVENING 360 capsule 3    levETIRAcetam (KEPPRA) 500 MG tablet TAKE THREE TABLETS  (1500 MG)  BY MOUTH TWICE A  tablet 3    LORazepam (ATIVAN) 0.5 MG tablet TAKE ONE TABLET BY MOUTH EVERY EVENING 90 tablet 1    medical cannabis (Patient's own supply) See Admin Instructions (The purpose of this order is to document that the patient reports taking medical cannabis.  This is not a prescription, and is not used to certify that the patient has a qualifying medical condition.)      Multiple Vitamins-Minerals (CENTRUM SILVER) per tablet Take 1 tablet by mouth daily      OXcarbazepine (TRILEPTAL) 150 MG tablet Increase as instructed to 450 mg twice a day (3 tablet twice a day) (Patient not taking: Reported on 8/6/2024) 50 tablet 0        Perceived AED Side Effects:  No    Medication Notes:        AED Medication Compliance:  compliant most of the time    Diagnostic studies reviewed:   Latest Reference Range & Units 08/01/24 14:58   Sodium 135 - 145 mmol/L 133 (L)   Potassium 3.4 - 5.3 mmol/L 3.8   Chloride 98 - 107 mmol/L 96 (L)   Carbon Dioxide (CO2) 22 - 29 mmol/L 26   Urea Nitrogen 8.0 - 23.0 mg/dL 12.4   Creatinine 0.51 - 0.95 mg/dL 0.60   GFR Estimate >60 mL/min/1.73m2 >90   Calcium 8.8 - 10.4 mg/dL 8.9   Anion Gap 7 - 15 mmol/L 11   Magnesium 1.7 - 2.3 mg/dL 2.3   Albumin 3.5 - 5.2  g/dL 4.9   Protein Total 6.4 - 8.3 g/dL 8.2   Alkaline Phosphatase 40 - 150 U/L 126   ALT 0 - 50 U/L 19   AST 0 - 45 U/L 28   Bilirubin Total <=1.2 mg/dL <0.2   Glucose 70 - 99 mg/dL 110 (H)   Lactic Acid 0.7 - 2.0 mmol/L 2.2 (H)   N-Terminal Pro BNP Inpatient 0 - 900 pg/mL 1,202 (H)   Troponin T, High Sensitivity <=14 ng/L 7   TSH 0.30 - 4.20 uIU/mL 2.03   WBC 4.0 - 11.0 10e3/uL 10.2   Hemoglobin 11.7 - 15.7 g/dL 12.3   Hematocrit 35.0 - 47.0 % 38.0   Platelet Count 150 - 450 10e3/uL 343   RBC Count 3.80 - 5.20 10e6/uL 4.07   MCV 78 - 100 fL 93   MCH 26.5 - 33.0 pg 30.2   MCHC 31.5 - 36.5 g/dL 32.4   RDW 10.0 - 15.0 % 12.7   % Neutrophils % 68   % Lymphocytes % 20   % Monocytes % 8   % Eosinophils % 2   % Basophils % 1   Absolute Basophils 0.0 - 0.2 10e3/uL 0.1   Absolute Eosinophils 0.0 - 0.7 10e3/uL 0.3   Absolute Immature Granulocytes <=0.4 10e3/uL 0.1   Absolute Lymphocytes 0.8 - 5.3 10e3/uL 2.0   Absolute Monocytes 0.0 - 1.3 10e3/uL 0.8   % Immature Granulocytes % 1   Absolute Neutrophils 1.6 - 8.3 10e3/uL 7.1   Absolute NRBCs 10e3/uL 0.0   NRBCs per 100 WBC <1 /100 0   INR 0.85 - 1.15  0.96   PTT 22 - 38 Seconds 33   Keppra (Levetiracetam) Level 10.0 - 40.0  g/mL 24.1   Oxcarb or Eslicarb Metabolite (MHD) 3 - 35 ug/mL <1 (L)   Carbamazepine 4.0 - 12.0 ug/mL 7.3   (L): Data is abnormally low  (H): Data is abnormally high    Review of Systems:  Memory Problems:  Yes - question if more breakthrough seizures than she is aware of    Other Issues:    Is patient safe to drive:  No    Exam:    BP (!) 173/91   Pulse 72   Temp 97.8  F (36.6  C) (Temporal)   LMP  (LMP Unknown)   SpO2 98%  working with PCP on BP    Wt Readings from Last 5 Encounters:   08/06/24 147 lb (66.7 kg)   02/20/24 138 lb 9.6 oz (62.9 kg)   01/12/24 135 lb 3.2 oz (61.3 kg)   11/21/23 132 lb 3.2 oz (60 kg)   04/04/23 130 lb (59 kg)       General: General examination reveals a well developed female in no acute distress    Neurological  Examination:    Mental Status: Alert and awake. Mood and affect were appropriate to situation. Memory not formally tested, answered questions appropriately. Language without dysarthria.  Cranial Nerves:  II-XII grossly intact. Face symmetric.  Musculoskeletal: Neck supple.  Motor: Moves all four extremities spontaneously and against gravity  Coordination: No rest tremor  Station and Gait: Station was normal based. Gait was normal with good stride, good arm swing and good turning.     Assessment and Plan:   Ms. Mane has history of focal epilepsy s/p vagus nerve stimulator placed in 2017. Her epilepsy is medically refractory, with continued increase in seizure frequency despite discontinuation of oxcarbazepine, and off Fycompa which was causing side effects. There is question if she is having more seizures than she is aware, with more recently noted gaps in time.     At this time, we will increase her Keppra to 7205-1814. I do not see that she has been on higher doses with room to increase this based on her levels. No other changes have been made in her medications.    We will have her undergo inpatient video EEG monitoring to evaluate for seizure burden and for presurgical consideration with medically refractory epilepsy, specifically DBS versus RNS as previously discussed with Dr. Hernandez.    Her VNS remains at 25-50% battery life. We will continue to monitor this at her return visits.    She is encouraged to continue with her mental health team.     Ms. Mane will transfer care to Dr. Gillespie with Dr. Hernandez's resignation with our clinic as already scheduled following inpatient evaluation. They were advised to call sooner with questions, concerns or worsening symptoms.     Thank you for letting me participate in your patient's care.    As described above, I met with the patient for 40 minutes and during this time counseling was greater than 50% of the visit time.    The longitudinal plan of care for the  diagnosis(es)/condition(s) as documented were addressed during this visit. Due to the added complexity in care, we will continue to support Mili in the subsequent management and with ongoing continuity of care.

## 2024-08-21 NOTE — LETTER
"2024       RE: Mili Mane  : 1965   MRN: [id number]      Dear Colleague,    Thank you for referring your patient, Mili Mane, to the [facility] [place] [facility] at [facility]. Please see a copy of my visit note below.    [facility] Epilepsy Care Progress Note      Patient:  Mili Mane  :  1965   Age:  59 year old   Today's Office Visit:  2024  Chief complaint: Follow up seizures    Epilepsy Data:  Her seizure started at age 18 months, early on she had \"petit mal\" which continued to age 11. When she was born she had \" some oxygen issues at birth, then as infant she had recurrent fever\". Early childhood she was on phenobarbital, phenytoin, mysoline and she continued to have starting spells several time per week. It seems over the years she has tried several seizure medications but continues to have high seizure burden. Her recurrent spells seem consistent with focal impaired seizures. She followed with Dr. Kendall for 33 years.      Seizure types:   Seizure type 1: focal seizures with impairment in awareness  - Aura of ringing in ear, stares, last 30 seconds, started 18 months. Per Patricia she has a \"blank stare for minutes\". No automatisms. She calls these \"small\" she describes them as \"I have loss of awareness, I do not get hurt, and I do not fall.   Seizure type 2: generalized tonic-clonic convulsion - whole body stiffening and shaking, she bites her cheek. Last \"hard seizure\" was 2021. In 2021 she was walking her dog, she fell and was confused.   Seizure type 3: She is aware it is coming on from a vague sound. Crouches down and has head jerking to the right side. She has had multiple of these over the last few months. She states she has had this in the shower before. She has impaired consciousness. There can be loss of bladder control, and biting of the cheek or tongue, as well as drooling. They " "can last five minutes in duration. She can be groggy and confused for around an hour.         Past AEDs:    Levetiracetam - started in 2012. She is not sure if it helps her seizures.   Carbatrol - started at age 1.   Celontin - \"thorat starting closing and had to go to hospital and placed on breathing\"  Phenytoin  - rash with phenytoin    Mysoline   Phenobarbital   Felbamate - did not work per her memory.   Lacosamide in 2014  - she had persistent tachycardia  Fycompa - fatigue up to 4mg daily, higher dosing not suspected to provide benefit  Oxcarbazepine - up to 450-450, worsening seizures, worsening mood, mild hyponatremia    History of Present Illness:   Ms. Mane was last seen by Dr. Hernandez on 5/29/2024. 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 She was medically refractory, with worsening seizures, worsening mood, and mild hyponatremia on oxcarbazepine, which she was to wean off. She was to continue levetiracetam 4903-2155, carbatrol 400-400, cobalt oral suspension 1200mg of CBD and 60mg THC 3mL-3mL, tangerine oral suspension with THC 240mg and traces of CBD 3mL at noon, and lorazepam 0.5mg at night.     Today, Ms. Mane presents with her niece. She had a typical breakthrough seizure earlier this month. She declined going to the ED three or four times before she became concerned she missed something and accepted the transport. She continues to have 10 seizures per month on average.    They are concerned as she has had gaps in time that she has realized are there. She has approximately 8 days where she does not remember people asking if she desires assistance with showering at her assisted living, and is concerned she may be having more seizures than she is aware of. Prolonged EEG monitoring was discussed, ambulatory versus inpatient video EEG monitoring.    She denies any side effects with her current medications. She has not been on higher " doses of levetiracetam before that she is aware of with her levels most consistently in the 20's. She has been on higher doses of carbatrol, and had issues with this per her recollection.     Her VNS battery life remains at 25-50%.     She remains interested in surgical intervention for her epilepsy. DBS and RNS have been discussed with Dr. Hernandez. They do have an appointment for transition of care with Dr. Gillespie coming up.     She has restarted talk therapy, and has found a therapist that has been a good fit.     She saw PCP for BP, who wanted her to check her blood pressure outside of doctors visits.     Current Outpatient Medications   Medication Sig Dispense Refill     atenolol (TENORMIN) 25 MG tablet Take 25 mg by mouth every evening       atenolol (TENORMIN) 50 MG tablet Take 50 mg by mouth every morning       CARBATROL 200 MG 12 hr capsule TAKE TWO CAPSULES BY MOUTH EVERY MORNING AND TAKE TWO CAPSULES BY MOUTH EVERY EVENING 360 capsule 3     levETIRAcetam (KEPPRA) 500 MG tablet TAKE THREE TABLETS  (1500 MG)  BY MOUTH TWICE A  tablet 3     LORazepam (ATIVAN) 0.5 MG tablet TAKE ONE TABLET BY MOUTH EVERY EVENING 90 tablet 1     medical cannabis (Patient's own supply) See Admin Instructions (The purpose of this order is to document that the patient reports taking medical cannabis.  This is not a prescription, and is not used to certify that the patient has a qualifying medical condition.)       Multiple Vitamins-Minerals (CENTRUM SILVER) per tablet Take 1 tablet by mouth daily       OXcarbazepine (TRILEPTAL) 150 MG tablet Increase as instructed to 450 mg twice a day (3 tablet twice a day) (Patient not taking: Reported on 8/6/2024) 50 tablet 0        Perceived AED Side Effects:  No    Medication Notes:        AED Medication Compliance:  compliant most of the time    Diagnostic studies reviewed:   Latest Reference Range & Units 08/01/24 14:58   Sodium 135 - 145 mmol/L 133 (L)   Potassium 3.4 - 5.3 mmol/L  3.8   Chloride 98 - 107 mmol/L 96 (L)   Carbon Dioxide (CO2) 22 - 29 mmol/L 26   Urea Nitrogen 8.0 - 23.0 mg/dL 12.4   Creatinine 0.51 - 0.95 mg/dL 0.60   GFR Estimate >60 mL/min/1.73m2 >90   Calcium 8.8 - 10.4 mg/dL 8.9   Anion Gap 7 - 15 mmol/L 11   Magnesium 1.7 - 2.3 mg/dL 2.3   Albumin 3.5 - 5.2 g/dL 4.9   Protein Total 6.4 - 8.3 g/dL 8.2   Alkaline Phosphatase 40 - 150 U/L 126   ALT 0 - 50 U/L 19   AST 0 - 45 U/L 28   Bilirubin Total <=1.2 mg/dL <0.2   Glucose 70 - 99 mg/dL 110 (H)   Lactic Acid 0.7 - 2.0 mmol/L 2.2 (H)   N-Terminal Pro BNP Inpatient 0 - 900 pg/mL 1,202 (H)   Troponin T, High Sensitivity <=14 ng/L 7   TSH 0.30 - 4.20 uIU/mL 2.03   WBC 4.0 - 11.0 10e3/uL 10.2   Hemoglobin 11.7 - 15.7 g/dL 12.3   Hematocrit 35.0 - 47.0 % 38.0   Platelet Count 150 - 450 10e3/uL 343   RBC Count 3.80 - 5.20 10e6/uL 4.07   MCV 78 - 100 fL 93   MCH 26.5 - 33.0 pg 30.2   MCHC 31.5 - 36.5 g/dL 32.4   RDW 10.0 - 15.0 % 12.7   % Neutrophils % 68   % Lymphocytes % 20   % Monocytes % 8   % Eosinophils % 2   % Basophils % 1   Absolute Basophils 0.0 - 0.2 10e3/uL 0.1   Absolute Eosinophils 0.0 - 0.7 10e3/uL 0.3   Absolute Immature Granulocytes <=0.4 10e3/uL 0.1   Absolute Lymphocytes 0.8 - 5.3 10e3/uL 2.0   Absolute Monocytes 0.0 - 1.3 10e3/uL 0.8   % Immature Granulocytes % 1   Absolute Neutrophils 1.6 - 8.3 10e3/uL 7.1   Absolute NRBCs 10e3/uL 0.0   NRBCs per 100 WBC <1 /100 0   INR 0.85 - 1.15  0.96   PTT 22 - 38 Seconds 33   Keppra (Levetiracetam) Level 10.0 - 40.0  g/mL 24.1   Oxcarb or Eslicarb Metabolite (MHD) 3 - 35 ug/mL <1 (L)   Carbamazepine 4.0 - 12.0 ug/mL 7.3   (L): Data is abnormally low  (H): Data is abnormally high    Review of Systems:  Memory Problems:  Yes - question if more breakthrough seizures than she is aware of    Other Issues:    Is patient safe to drive:  No    Exam:    BP (!) 173/91   Pulse 72   Temp 97.8  F (36.6  C) (Temporal)   LMP  (LMP Unknown)   SpO2 98%  working with PCP on BP    Wt  Readings from Last 5 Encounters:   08/06/24 147 lb (66.7 kg)   02/20/24 138 lb 9.6 oz (62.9 kg)   01/12/24 135 lb 3.2 oz (61.3 kg)   11/21/23 132 lb 3.2 oz (60 kg)   04/04/23 130 lb (59 kg)       General: General examination reveals a well developed female in no acute distress    Neurological Examination:    Mental Status: Alert and awake. Mood and affect were appropriate to situation. Memory not formally tested, answered questions appropriately. Language without dysarthria.  Cranial Nerves:  II-XII grossly intact. Face symmetric.  Musculoskeletal: Neck supple.  Motor: Moves all four extremities spontaneously and against gravity  Coordination: No rest tremor  Station and Gait: Station was normal based. Gait was normal with good stride, good arm swing and good turning.     Assessment and Plan:   Ms. Mane has history of focal epilepsy s/p vagus nerve stimulator placed in 2017. Her epilepsy is medically refractory, with continued increase in seizure frequency despite discontinuation of oxcarbazepine, and off Fycompa which was causing side effects. There is question if she is having more seizures than she is aware, with more recently noted gaps in time.     At this time, we will increase her Keppra to 6831-1338. I do not see that she has been on higher doses with room to increase this based on her levels. No other changes have been made in her medications.    We will have her undergo inpatient video EEG monitoring to evaluate for seizure burden and for presurgical consideration with medically refractory epilepsy, specifically DBS versus RNS as previously discussed with Dr. Hernandez.    Her VNS remains at 25-50% battery life. We will continue to monitor this at her return visits.    She is encouraged to continue with her mental health team.     Ms. Mane will transfer care to Dr. Gillespie with Dr. Hernandez's resignation with our clinic as already scheduled following inpatient evaluation. They were advised to call sooner  with questions, concerns or worsening symptoms.     Thank you for letting me participate in your patient's care.    As described above, I met with the patient for 40 minutes and during this time counseling was greater than 50% of the visit time.    The longitudinal plan of care for the diagnosis(es)/condition(s) as documented were addressed during this visit. Due to the added complexity in care, we will continue to support Mili in the subsequent management and with ongoing continuity of care.                      MPhfaizan ENGEL VNS Interrogation  Patient: Mili Mane  : 1965   Age: 59 year old  Today's Office Visit: 2024  Perceived VNS Side Effects:  Patient thinks she may hav occasional difficulty swallowing, however she also repost heart burn, and feels the difficulty is lower down in her chest rather than up near her throat.    VNS Management (VNS Flowsheet)      2024     1:00 PM   Vagus Nerve Stimulation   MD Boyer   Implant Date 2017   Model Number 106   Serial Number [id number]   Patient ID ECA   VNS Interrogation Incoming Parameters   Date 2024   Output Current (mA) 1.25   Signal Frequency (Hz) 20   Pulse Width (usec) 250   Signal ON Time (sec) 30   Signal OFF Time (min) 5   Magent Output Current (mA) 1.5   Magent ON Time (sec) 60   Magnet Pulse Width (usec) 250   AutoStim Output Current (mA) 1.25   AutoStim Pulse Width (usec) 250   AutoStim ON Time (sec) 30   Tachycardia Detection ON/OFF on   Heartbeat Detection Sensitivity (1-5) 2   Perform Verify Heartbeat Detection (y/n) No   Threshold for AutoStim (%) 40   Near End of Service (Y/N)    Output Current (OK/Low) OK   Current Delivered (mA) 1.25   Impedance Value (Ohms) 2401   Battery Status Indicator (Green/Yellow/Red, %) Green 25 - 50%   IFI (No/Yes) No   Number of Magnet Swipes 644 lifetime   Average AutoStims per day 32.73   # of days since last visit 183        Duty cycle including autostim 10.88%        Again,  thank you for allowing me to participate in the care of your patient.      Sincerely,    Enedelia Boyer PA-C

## 2024-08-21 NOTE — PATIENT INSTRUCTIONS
We will increase your Keppra to 3 tabs in the morning and 4 tabs in the evening    We will have you undergo inpatient video EEG monitoring to evaluate for seizure burden and for presurgical consideration with medically refractory epilepsy    Your VNS remains at 25-50% battery life. We can recheck this at the next visit.    Keep your appointment as scheduled with Dr. Gillespie. Call sooner with questions, concerns or worsening symptoms.

## 2024-08-21 NOTE — PROGRESS NOTES
Sanjeev ENGEL VNS Interrogation  Patient: Mili Mane  : 1965   Age: 59 year old  Today's Office Visit: 2024  Perceived VNS Side Effects:  Patient thinks she may hav occasional difficulty swallowing, however she also repost heart burn, and feels the difficulty is lower down in her chest rather than up near her throat.    VNS Management (VNS Flowsheet)      2024     1:00 PM   Vagus Nerve Stimulation   MD Boyer   Implant Date 2017   Model Number 106   Serial Number [id number]   Patient ID ECA   VNS Interrogation Incoming Parameters   Date 2024   Output Current (mA) 1.25   Signal Frequency (Hz) 20   Pulse Width (usec) 250   Signal ON Time (sec) 30   Signal OFF Time (min) 5   Magent Output Current (mA) 1.5   Magent ON Time (sec) 60   Magnet Pulse Width (usec) 250   AutoStim Output Current (mA) 1.25   AutoStim Pulse Width (usec) 250   AutoStim ON Time (sec) 30   Tachycardia Detection ON/OFF on   Heartbeat Detection Sensitivity (1-5) 2   Perform Verify Heartbeat Detection (y/n) No   Threshold for AutoStim (%) 40   Near End of Service (Y/N)    Output Current (OK/Low) OK   Current Delivered (mA) 1.25   Impedance Value (Ohms) 2401   Battery Status Indicator (Green/Yellow/Red, %) Green 25 - 50%   IFI (No/Yes) No   Number of Magnet Swipes 644 lifetime   Average AutoStims per day 32.73   # of days since last visit 183        Duty cycle including autostim 10.88%

## 2024-09-10 DIAGNOSIS — G40.109 LOCALIZATION-RELATED FOCAL EPILEPSY WITH SIMPLE PARTIAL SEIZURES (H): ICD-10-CM

## 2024-09-11 RX ORDER — LORAZEPAM 0.5 MG/1
TABLET ORAL
Qty: 90 TABLET | Refills: 1 | Status: ON HOLD | OUTPATIENT
Start: 2024-09-11

## 2024-09-24 ENCOUNTER — VIRTUAL VISIT (OUTPATIENT)
Dept: NEUROLOGY | Facility: CLINIC | Age: 59
End: 2024-09-24
Payer: COMMERCIAL

## 2024-09-24 DIAGNOSIS — G40.209 PARTIAL SYMPTOMATIC EPILEPSY WITH COMPLEX PARTIAL SEIZURES, NOT INTRACTABLE, WITHOUT STATUS EPILEPTICUS (H): Primary | ICD-10-CM

## 2024-09-24 NOTE — PROGRESS NOTES
[facility]:  Care Coordination Note     SITUATION   Mili Mane is a 59 year old female who is receiving support for:  Clinic Care Coordination - Initial      BACKGROUND     Pre Admit     ASSESSMENT     Introduction to [facility] was performed, including hospital policies, how seizures are induced, hygiene breaks, and the importance of staff assistance whenever out of bed, including when in the bathroom.  Caty is aware that the length of stay is generally five to seven days, but that this is dependent on what is captured on EEG.    No other questions at this time.  Patient was instructed to call if questions or concerns arise.       PLAN          Nursing Interventions:  Admission education     Follow-up plan:  RN to follow up post hospital stay        Lana Lyon RN

## 2024-09-26 ENCOUNTER — HOSPITAL ENCOUNTER (INPATIENT)
Facility: CLINIC | Age: 59
End: 2024-09-26
Attending: PSYCHIATRY & NEUROLOGY | Admitting: PSYCHIATRY & NEUROLOGY
Payer: COMMERCIAL

## 2024-09-26 ENCOUNTER — HOSPITAL ENCOUNTER (OUTPATIENT)
Dept: NEUROLOGY | Facility: CLINIC | Age: 59
Discharge: HOME | End: 2024-09-26
Attending: PHYSICIAN ASSISTANT | Admitting: PSYCHIATRY & NEUROLOGY
Payer: COMMERCIAL

## 2024-09-26 DIAGNOSIS — G40.119 INTRACTABLE FOCAL EPILEPSY (H): Primary | ICD-10-CM

## 2024-09-26 DIAGNOSIS — G40.209 PARTIAL SYMPTOMATIC EPILEPSY WITH COMPLEX PARTIAL SEIZURES, NOT INTRACTABLE, WITHOUT STATUS EPILEPTICUS (H): ICD-10-CM

## 2024-09-26 LAB
ALBUMIN SERPL BCG-MCNC: 4.4 G/DL (ref 3.5–5.2)
ALP SERPL-CCNC: 114 U/L (ref 40–150)
ALT SERPL W P-5'-P-CCNC: 6 U/L (ref 0–50)
ANION GAP SERPL CALCULATED.3IONS-SCNC: 9 MMOL/L (ref 7–15)
AST SERPL W P-5'-P-CCNC: 19 U/L (ref 0–45)
BILIRUB SERPL-MCNC: <0.2 MG/DL
BUN SERPL-MCNC: 15.7 MG/DL (ref 8–23)
CALCIUM SERPL-MCNC: 9 MG/DL (ref 8.8–10.4)
CARBAMAZEPINE SERPL-MCNC: 6.6 UG/ML (ref 4–12)
CHLORIDE SERPL-SCNC: 100 MMOL/L (ref 98–107)
CREAT SERPL-MCNC: 0.66 MG/DL (ref 0.51–0.95)
EGFRCR SERPLBLD CKD-EPI 2021: >90 ML/MIN/1.73M2
ERYTHROCYTE [DISTWIDTH] IN BLOOD BY AUTOMATED COUNT: 12.2 % (ref 10–15)
GLUCOSE SERPL-MCNC: 124 MG/DL (ref 70–99)
HCO3 SERPL-SCNC: 26 MMOL/L (ref 22–29)
HCT VFR BLD AUTO: 35.8 % (ref 35–47)
HGB BLD-MCNC: 11.9 G/DL (ref 11.7–15.7)
LEVETIRACETAM SERPL-MCNC: 29.8 ΜG/ML (ref 10–40)
MCH RBC QN AUTO: 30.4 PG (ref 26.5–33)
MCHC RBC AUTO-ENTMCNC: 33.2 G/DL (ref 31.5–36.5)
MCV RBC AUTO: 92 FL (ref 78–100)
PLATELET # BLD AUTO: 320 10E3/UL (ref 150–450)
POTASSIUM SERPL-SCNC: 4.3 MMOL/L (ref 3.4–5.3)
PROT SERPL-MCNC: 7.3 G/DL (ref 6.4–8.3)
RBC # BLD AUTO: 3.91 10E6/UL (ref 3.8–5.2)
SODIUM SERPL-SCNC: 135 MMOL/L (ref 135–145)
WBC # BLD AUTO: 6.3 10E3/UL (ref 4–11)

## 2024-09-26 PROCEDURE — 80156 ASSAY CARBAMAZEPINE TOTAL: CPT | Performed by: PHYSICIAN ASSISTANT

## 2024-09-26 PROCEDURE — 80053 COMPREHEN METABOLIC PANEL: CPT | Performed by: PHYSICIAN ASSISTANT

## 2024-09-26 PROCEDURE — 95714 VEEG EA 12-26 HR UNMNTR: CPT

## 2024-09-26 PROCEDURE — 85018 HEMOGLOBIN: CPT | Performed by: PHYSICIAN ASSISTANT

## 2024-09-26 PROCEDURE — 999N000248 HC STATISTIC IV INSERT WITH US BY RN

## 2024-09-26 PROCEDURE — 99223 1ST HOSP IP/OBS HIGH 75: CPT | Mod: AI | Performed by: PHYSICIAN ASSISTANT

## 2024-09-26 PROCEDURE — 36415 COLL VENOUS BLD VENIPUNCTURE: CPT | Performed by: PHYSICIAN ASSISTANT

## 2024-09-26 PROCEDURE — 80177 DRUG SCRN QUAN LEVETIRACETAM: CPT | Performed by: PHYSICIAN ASSISTANT

## 2024-09-26 PROCEDURE — 250N000013 HC RX MED GY IP 250 OP 250 PS 637: Performed by: PHYSICIAN ASSISTANT

## 2024-09-26 PROCEDURE — 120N000002 HC R&B MED SURG/OB UMMC

## 2024-09-26 RX ORDER — LEVETIRACETAM 750 MG/1
1500 TABLET ORAL EVERY EVENING
Status: DISCONTINUED | OUTPATIENT
Start: 2024-09-26 | End: 2024-09-27

## 2024-09-26 RX ORDER — LEVETIRACETAM 500 MG/1
1000 TABLET ORAL EVERY MORNING
Status: DISCONTINUED | OUTPATIENT
Start: 2024-09-27 | End: 2024-09-27

## 2024-09-26 RX ORDER — DOCUSATE SODIUM 100 MG/1
100 CAPSULE, LIQUID FILLED ORAL 2 TIMES DAILY PRN
Status: ACTIVE | OUTPATIENT
Start: 2024-09-26

## 2024-09-26 RX ORDER — ATENOLOL 25 MG/1
50 TABLET ORAL EVERY MORNING
Status: DISPENSED | OUTPATIENT
Start: 2024-09-27

## 2024-09-26 RX ORDER — MULTIPLE VITAMINS W/ MINERALS TAB 9MG-400MCG
1 TAB ORAL DAILY
Status: DISPENSED | OUTPATIENT
Start: 2024-09-27

## 2024-09-26 RX ORDER — ACETAMINOPHEN 325 MG/1
650 TABLET ORAL EVERY 4 HOURS PRN
Status: ACTIVE | OUTPATIENT
Start: 2024-09-26

## 2024-09-26 RX ORDER — GINSENG 100 MG
CAPSULE ORAL 3 TIMES DAILY PRN
Status: ACTIVE | OUTPATIENT
Start: 2024-09-26

## 2024-09-26 RX ORDER — LIDOCAINE HYDROCHLORIDE 20 MG/ML
5 SOLUTION OROPHARYNGEAL EVERY 6 HOURS PRN
Status: ACTIVE | OUTPATIENT
Start: 2024-09-26

## 2024-09-26 RX ORDER — LEVETIRACETAM 750 MG/1
1500 TABLET ORAL EVERY MORNING
Status: DISCONTINUED | OUTPATIENT
Start: 2024-09-27 | End: 2024-09-26

## 2024-09-26 RX ORDER — IBUPROFEN 200 MG
400 TABLET ORAL EVERY 6 HOURS PRN
Status: ACTIVE | OUTPATIENT
Start: 2024-09-26

## 2024-09-26 RX ORDER — CARBAMAZEPINE 200 MG/1
400 CAPSULE, EXTENDED RELEASE ORAL 2 TIMES DAILY
Status: ACTIVE | OUTPATIENT
Start: 2024-09-26

## 2024-09-26 RX ORDER — LORAZEPAM 0.5 MG/1
0.5 TABLET ORAL EVERY EVENING
Status: DISPENSED | OUTPATIENT
Start: 2024-09-26

## 2024-09-26 RX ORDER — LIDOCAINE 40 MG/G
CREAM TOPICAL
Status: ACTIVE | OUTPATIENT
Start: 2024-09-26

## 2024-09-26 RX ORDER — LORAZEPAM 2 MG/ML
2 INJECTION INTRAMUSCULAR
Status: DISPENSED | OUTPATIENT
Start: 2024-09-26

## 2024-09-26 RX ORDER — ATENOLOL 25 MG/1
25 TABLET ORAL EVERY EVENING
Status: DISPENSED | OUTPATIENT
Start: 2024-09-26

## 2024-09-26 RX ADMIN — CARBAMAZEPINE 400 MG: 200 CAPSULE, EXTENDED RELEASE ORAL at 19:48

## 2024-09-26 RX ADMIN — ATENOLOL 25 MG: 25 TABLET ORAL at 19:43

## 2024-09-26 RX ADMIN — LEVETIRACETAM 1500 MG: 750 TABLET, FILM COATED ORAL at 19:43

## 2024-09-26 RX ADMIN — LORAZEPAM 0.5 MG: 0.5 TABLET ORAL at 19:43

## 2024-09-26 ASSESSMENT — ACTIVITIES OF DAILY LIVING (ADL)
ADLS_ACUITY_SCORE: 25
ADLS_ACUITY_SCORE: 25
ADLS_ACUITY_SCORE: 22
ADLS_ACUITY_SCORE: 22
ADLS_ACUITY_SCORE: 25
ADLS_ACUITY_SCORE: 25
ADLS_ACUITY_SCORE: 22
ADLS_ACUITY_SCORE: 25
ADLS_ACUITY_SCORE: 22
ADLS_ACUITY_SCORE: 33
ADLS_ACUITY_SCORE: 33
ADLS_ACUITY_SCORE: 25

## 2024-09-26 NOTE — PLAN OF CARE
Goal Outcome Evaluation:      Plan of Care Reviewed With: patient    Overall Patient Progress: no change    Outcome Evaluation: Patient admitted this shift, no events witnessed or reported    Status: Pt admitted today for VEEG monitoring as pre-surgical eval.   Vitals: VSS on RA  Neuros: Intact   IV: PIV SL  Resp/trach: LS clear throughout   Diet: Regular diet, tolerating well   Bowel status: BM this AM per pt   : Voiding spontaneously   Skin: Intact, EEG leads intact   Pain: Denies   Activity: Up SBA with GB   Plan: No events witnessed or reported. Continue to monitor and follow POC    Arrived from: Home    Belongings/meds: Belongings at bedside, medications taken home by niece with exception to med in med bin labeled by pharmacy   2 RN Skin Assessment Completed by: Phyllis     Non-intact findings documented (yes/no/NA): Skin intact

## 2024-09-26 NOTE — PHARMACY-ADMISSION MEDICATION HISTORY
Pharmacy Intern Admission Medication History    Admission medication history is complete. The information provided in this note is only as accurate as the sources available at the time of the update.    Information Source(s): Patient and CareEverywhere/SureScripts via in-person    Pertinent Information: Patient was a reliable source.    Changes made to PTA medication list:  Added: None  Deleted: None  Changed: None    Allergies reviewed with patient and updates made in EHR: no    Medication History Completed By: Elver Rosas 9/26/2024 6:45 PM    PTA Med List   Medication Sig Last Dose    atenolol (TENORMIN) 25 MG tablet Take 25 mg by mouth every evening 9/25/2024 at PM    atenolol (TENORMIN) 50 MG tablet Take 50 mg by mouth every morning 9/26/2024 at AM    CARBATROL 200 MG 12 hr capsule TAKE TWO CAPSULES BY MOUTH EVERY MORNING AND TAKE TWO CAPSULES BY MOUTH EVERY EVENING (Patient taking differently: Take 400 mg by mouth 2 times daily. TAKE TWO CAPSULES BY MOUTH EVERY MORNING AND TAKE TWO CAPSULES BY MOUTH EVERY EVENING. BRAND) 9/26/2024 at AM    levETIRAcetam (KEPPRA) 500 MG tablet Take 3 tabs by mouth every morning and 4 tabs by mouth at bedtime 9/26/2024 at AM    LORazepam (ATIVAN) 0.5 MG tablet TAKE ONE TABLET BY MOUTH EVERY EVENING (Patient taking differently: Take 0.5 mg by mouth every evening. TAKE ONE TABLET BY MOUTH EVERY EVENING) 9/25/2024 at PM    medical cannabis (Patient's own supply) See Admin Instructions (The purpose of this order is to document that the patient reports taking medical cannabis.  This is not a prescription, and is not used to certify that the patient has a qualifying medical condition.) 9/25/2024 at 1800    Multiple Vitamins-Minerals (CENTRUM SILVER) per tablet Take 1 tablet by mouth daily 9/26/2024 at AM

## 2024-09-26 NOTE — LETTER
Transition Communication Hand-off for Care Transitions to Next Level of Care Provider    Name: Mili Mane  : 1965  MRN #: [id number]  Primary Care Provider: Leanna Floyd MD     Primary Clinic: [address] [place] [id number]     Reason for Hospitalization:  Seizure  Intractable focal epilepsy  Admit Date/Time: 2024 12:02 PM  Discharge Date: 10/9/24  Payor Source: Payor: [facility] / Plan: [facility] DUAL / Product Type: HMO /     Readmission Assessment Measure (TRUMAN) Risk Score/category: 8%    Reason for Communication Hand-off Referral: Fragility    Discharge Plan:  Return to [facility]      Concern for non-adherence with plan of care:  No   Discharge Needs Assessment:  Needs      Flowsheet Row Most Recent Value   Equipment Currently Used at Home grab bar, toilet, grab bar, tub/shower, shower chair            Already enrolled in Tele-monitoring program and name of program:  no  Follow-up specialty is recommended: Yes    Follow-up plan:    Future Appointments   Date Time Provider Department Center   10/11/2024  1:30 PM 1, [facility] Nurse GISSELLE [facility]   10/23/2024 10:00 AM Padmini Redmond PA [facility]   2024  2:00 PM Dario Gillespie MD MEEPIL [facility]       Any outstanding tests or procedures:        Radiology & Cardiology Orders       Future Labs/Procedures Complete By Expires    MR Brain w/o & w Contrast  10/16/2024 (Approximate) 10/7/2025    Process Instructions:    Administration of IV contrast (contrast agent, dose, and amount) will be tailored to this examination per the appropriate written protocol listed in the Protocol E-Book, or by the interpreting provider. If you do not want your order altered by the radiologist, please state that in the order comments.    Comments:    3T epilepsy protocol. Please note has VNS          Referrals       Future Labs/Procedures    Adult Neuropsychology  Referral     Process Instructions:    [facility]  Neuropsychology clinic does not see patients for the following reasons (patients referred for these reasons will be referred outside of [facility]):  ADHD,  Autism/Developmental Delay/Learning Disability, Court-Ordered Evaluations, Guardianship, Citizenship Test Waiver, patients for whom mental health or psychiatric concerns are the primary reason for referral.    Scheduling Instructions:    With Dr. An    Comments:    Please be aware that coverage of these services is subject to the terms and limitations of your health insurance plan.  Call member services at your health plan with any benefit or coverage questions.  [facility] will call you to coordinate your care as prescribed by the provider.  If you don t hear from a representative within 2 business days, please call [phone].                Key Recommendations:      Kiki Engle RN    AVS/Discharge Summary is the source of truth; this is a helpful guide for improved communication of patient story

## 2024-09-26 NOTE — H&P
[facility] Epilepsy Admission     Mili Mane MRN# [id number]   YOB: 1965 Age: 59 year old        Reason for Admission: Patient is a 59 year old, right-handed female with a history of intractable focal epilepsy s/p VNS placement, depression, anxiety and HTN who is being admitted for vEEG monitoring for presurgical evaluation.    HPI: She reports seizure onset at age 18 months. Early on she believes seizures were staring seizures and she was treated with phenobarbital, phenytoin and around puberty convulsive seizures started occurring. Despite numerous medication trials and VNS implantation in 2002 she has never had a significant period of seizure freedom that she can recall.     Seizure types:  Type 1: May have warning, not always. Stares, unresponsive and has been noted to have hand movements. Lasts about 2 minutes. She is amnestic for what transpires. After confused and may take 20 minutes to get back to baseline. Frequency unclear but patient feels like she notes periods of missed time nearly daily.     Type 2: May have a warning (description difficult to describe) as she as told her niece she is about to have a seizure before. Has stiffening, shaking, drooling. Eyes open. Lasts 4-5 minutes. Has bitten tongue and had urinary incontinence. After in confused, tired and sore. Sometimes she will wake up with a bitten tongue. Currently occurring once a months.     Triggers: stress, lack of sleep, overheated, bad weather    Past antiepileptic drugs:   Optimed 4/2022:  1. Carbatrol/carbamazepine:  From 2005 to present.  Max dose 1800 mg per day with a CP max of 16.5 mg/L and carbamazepine epoxide 3 mg/L.   2.  Depakote or valproic acid from 1979 until 1988.  She never took the sprinkles or ER formulation.  It was tolerated for 9 years and then the patient started experiencing nausea.  3.  Dilantin/phenytoin patient had puffy gums in the 70s and she was on it until she got braces.  4.   "Celontin/mesuximide:  When the patient was on it, had throat closed.  She got rash and received steroid treatment.  5.  Keppra:  The patient had 2 trials.  She was on it before in 2008 and was retried 01/2020 to present.  Max dose 2500 mg per day with a CP max of 18.8 mg/L.  The only side effects are some GI upset 20 years ago, but currently none,questionable mood changes.  6.  Lamictal.  It appears maybe patient was on Lamictal; unclear, do not have data.  7.  Mysoline/primidone:  Possible rash in the 70s/early childhood.  Please be aware that phenobarbital caused her a rash, so mysoline gets converted in the body to phenobarbital, so she would have had a rash with it, too.  8.  Neurontin/gabapentin:  She was on it.  Had nausea.  9.  Phenobarbital:  Early childhood rash.  10.  Topamax:  It as in the combination with Carbatrol.  Patient had nausea.  No more data.  11.  Vimpat/lacosamide:  Yes in 06/02/2010 until 01/2014.  Max dose 400 mg per day.  CP max 3.1/7.5 mg/L.  It was used in the combination with carbamazepine and the patient's EKG had abnormalities.    Also oxcarbazepine max dose 450-450, stopped 2/2024 mild hyponatremia/worsening mood and seizures, Fycompa started 11/2023, max dose 4 mg hs (fatigue), per patient she thinks felbamate not beneficial      Risk Factors: She reports her mom was given something to delay birth and she had \"oxygen issues at birth\" however she can not further elaborate. Two paternal cousins have seizures and a maternal cousin had a febrile seizure. Otherwise no developmental delay, febrile seizures, CNS infections, tumors, strokes, significant head injuries or history of substance abuse.     Prior Epilepsy Work-up:  Ambulatory EEG 4/2022 at [facility] showed 3 days of ambulatory EEG were abnormal due to the presence of left and right frontotemporal slowing and epileptiform discharges suggestive of cortical dysfunction and irritability in these regions. On ambulatory day 1 patient " reported 4 events of pain in her head or left ear which had no EEG correlate to suggest seizure activity. On ambulatory day 3 there was 1 electroclinical seizure recorded with onset in the left frontotemporal region and then continuation of ictal pattern in the right frontotemporal region. Clinically patient reported an aura followed by pulling off contact C4 electrode suggesting she may have been confused. EEG findings are consistent with focal epilepsy in the left and right frontotemporal region.   Ambulatory EEG at [facility] 9/2014 was abnormal EEG due to the presence of Intermittent bursts of mixed theta and delta slowing during this recording during wakefulness.Bilateral temporal independent epileptiform activities during this recording.  No clinical or electrographic seizures were observed during this recording.    Head CT [facility] 5/2018 showed No acute intracranial pathology. No significant change since 6/3/2004.    Past Medical History:   Intractable focal epilepsy s/p VNS placement 2002 with replacement 2017  HTN  Depression  Anxiety  Cardiomyopathy (per cardiology notes 2017), left bundle branch block on EKG  GERD  Past Medical History:   Diagnosis Date    Adjustment disorder with depressed mood     Epileptic seizure (H)     HTN (hypertension)      Past Surgical History:   Procedure Laterality Date    REPLACE GENERATOR STIMULATOR VAGUS NERVE Left 6/20/2017    Procedure: REPLACE GENERATOR STIMULATOR VAGUS NERVE;  Vagus Nerve Stimulator Replacement  ;  Surgeon: Eliseo Nolen MD;  Location: [facility] OR    vnp           Medications:   Medications Prior to Admission   Medication Sig Dispense Refill Last Dose    atenolol (TENORMIN) 25 MG tablet Take 25 mg by mouth every evening   9/25/2024 at PM    atenolol (TENORMIN) 50 MG tablet Take 50 mg by mouth every morning   9/26/2024 at AM    CARBATROL 200 MG 12 hr capsule TAKE TWO CAPSULES BY MOUTH EVERY MORNING AND TAKE TWO CAPSULES BY MOUTH EVERY EVENING (Patient  taking differently: Take 400 mg by mouth 2 times daily. TAKE TWO CAPSULES BY MOUTH EVERY MORNING AND TAKE TWO CAPSULES BY MOUTH EVERY EVENING. BRAND) 360 capsule 3 9/26/2024 at AM    levETIRAcetam (KEPPRA) 500 MG tablet Take 3 tabs by mouth every morning and 4 tabs by mouth at bedtime 630 tablet 3 9/26/2024 at AM    LORazepam (ATIVAN) 0.5 MG tablet TAKE ONE TABLET BY MOUTH EVERY EVENING (Patient taking differently: Take 0.5 mg by mouth every evening. TAKE ONE TABLET BY MOUTH EVERY EVENING) 90 tablet 1 9/25/2024 at PM    Multiple Vitamins-Minerals (CENTRUM SILVER) per tablet Take 1 tablet by mouth daily   9/26/2024 at AM    medical cannabis (Patient's own supply) See Admin Instructions (The purpose of this order is to document that the patient reports taking medical cannabis.  This is not a prescription, and is not used to certify that the patient has a qualifying medical condition.)          Allergies:   Allergies   Allergen Reactions    Methsuximide Anaphylaxis     Anaphylaxis - throat closed and severe rash    Depakote [Valproic Acid]      Tolerated for many years, then started experiencing nausea    Dilantin [Phenytoin]      Possibly caused rash and puffy gums    Fish Allergy     Gabapentin      nausea    Lacosamide      EKG abnormalities    Mysoline [Primidone]      Possibly caused rash    Nuts     Phenobarbital      Possibly caused rash    Pollen Extract/Tree Extract     Topiramate      nausea    Penicillins Rash       Family History:   Family History   Problem Relation Age of Onset    Hypertension Mother     Lipids Mother     Cardiovascular Maternal Uncle     Cardiovascular Maternal Uncle     Cardiovascular Paternal Grandfather     Cancer Maternal Grandmother         gastric    Asthma Sister        Social History:   Social History     Tobacco Use    Smoking status: Never    Smokeless tobacco: Never   Substance Use Topics    Alcohol use: No     Alcohol/week: 0.0 standard drinks of alcohol   Took regular classes  in school and graduated high school. No college. Was teased in school because of seizures. No trauma or abuse. 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 Prior to moving into her assisted living facility in 1/2024, she was living with her father and acting as his caretaker for the previous 7 years prior to his death 1/19/2024. Not , no kids. She doesn't drive and her niece, Cierra assists with outings and appointments. No alcohol, tobacco or drug use (other than her medical marijuana from [employer]). In her assisted living facility she manages her own medications and uses a pill box. Her assisted living provides meals, housekeeping and laundry services. She does her own bathing, but staff generally present in case she has a seizure.     She reports history of depression and anxiety. Currently following with a therapist monthly.     Review of Systems: Positive for tiredness, never feeling well rested, SOB with exertion, occasional headaches, occasional diarrhea and poor memory. The rest of the 10 point review of systems negative except per HPI    Physical Exam/Vitals:  not currently breastfeeding.  General: NAD  Head: NC/AT  Respiratory: lungs CTA bilaterally  Cardiac: RRR, no murmur  Abdomen: soft, nontender, normal bowel sounds  Extremities: no LE edema  Neuro: Alert and oriented. Speech fluent. Hearing intact to normal conversation. PERRL, EOM's intact. Face symmetric, tongue midline. Strong shoulder shrug bilaterally. Strength 5/5 bilaterally. No drift or pronation. Intact FNF. Sensation intact to light touch bilaterally. DTR's 2+ bilaterally.      Data:  8/1/24 carbamazepine 7.3, levetiracetam 24.1 sodium 133     Current antiepileptic drugs:  Levetiracetam 6368-2974  Carbatrol (brand) 400-400  Ativan 0.5 mg hs     Assessment and Plan:  1.Patient is a 59 year old, right-handed female with a history of intractable focal epilepsy s/p VNS placement,  depression, anxiety and HTN who is being admitted for vEEG monitoring for presurgical evaluation.    -admit for vEEG monitoring  -seizure precautions  -SCD's for DVT prophylaxis  -ativan PRN seizures per protocol  -CBC, CMP, antiepileptic drug levels  -decrease levetiracetam [id number]      Padmini Redmond PA-C    Total time: I spent 60 minutes face-to-face with patient and family reviewing history and performing a physical examination. I spent an additional 15 minutes coordinating care, reviewing labs and imaging. I answered all of patients questions and addressed immediate concerns.

## 2024-09-27 ENCOUNTER — APPOINTMENT (OUTPATIENT)
Dept: NEUROLOGY | Facility: CLINIC | Age: 59
End: 2024-09-27
Attending: PHYSICIAN ASSISTANT
Payer: COMMERCIAL

## 2024-09-27 LAB — GLUCOSE BLDC GLUCOMTR-MCNC: 121 MG/DL (ref 70–99)

## 2024-09-27 PROCEDURE — 250N000013 HC RX MED GY IP 250 OP 250 PS 637: Performed by: PSYCHIATRY & NEUROLOGY

## 2024-09-27 PROCEDURE — 95720 EEG PHY/QHP EA INCR W/VEEG: CPT | Performed by: PSYCHIATRY & NEUROLOGY

## 2024-09-27 PROCEDURE — 120N000002 HC R&B MED SURG/OB UMMC

## 2024-09-27 PROCEDURE — 99231 SBSQ HOSP IP/OBS SF/LOW 25: CPT | Mod: 25 | Performed by: PSYCHIATRY & NEUROLOGY

## 2024-09-27 PROCEDURE — 95714 VEEG EA 12-26 HR UNMNTR: CPT

## 2024-09-27 PROCEDURE — 250N000011 HC RX IP 250 OP 636: Performed by: PHYSICIAN ASSISTANT

## 2024-09-27 PROCEDURE — 250N000013 HC RX MED GY IP 250 OP 250 PS 637: Performed by: PHYSICIAN ASSISTANT

## 2024-09-27 RX ORDER — LEVETIRACETAM 750 MG/1
750 TABLET ORAL 2 TIMES DAILY
Status: DISCONTINUED | OUTPATIENT
Start: 2024-09-27 | End: 2024-09-27

## 2024-09-27 RX ORDER — LEVETIRACETAM 750 MG/1
1500 TABLET ORAL ONCE
Status: COMPLETED | OUTPATIENT
Start: 2024-09-27 | End: 2024-09-27

## 2024-09-27 RX ADMIN — ATENOLOL 50 MG: 25 TABLET ORAL at 09:25

## 2024-09-27 RX ADMIN — LEVETIRACETAM 750 MG: 750 TABLET, FILM COATED ORAL at 21:27

## 2024-09-27 RX ADMIN — LORAZEPAM 0.5 MG: 0.5 TABLET ORAL at 21:27

## 2024-09-27 RX ADMIN — LORAZEPAM 2 MG: 2 INJECTION INTRAMUSCULAR; INTRAVENOUS at 19:52

## 2024-09-27 RX ADMIN — CARBAMAZEPINE 400 MG: 200 CAPSULE, EXTENDED RELEASE ORAL at 21:29

## 2024-09-27 RX ADMIN — ATENOLOL 25 MG: 25 TABLET ORAL at 21:27

## 2024-09-27 RX ADMIN — LEVETIRACETAM 1500 MG: 750 TABLET, FILM COATED ORAL at 22:29

## 2024-09-27 RX ADMIN — CARBAMAZEPINE 400 MG: 200 CAPSULE, EXTENDED RELEASE ORAL at 09:25

## 2024-09-27 RX ADMIN — Medication 1 TABLET: at 09:24

## 2024-09-27 RX ADMIN — LEVETIRACETAM 1000 MG: 500 TABLET, FILM COATED ORAL at 09:24

## 2024-09-27 ASSESSMENT — ACTIVITIES OF DAILY LIVING (ADL)
ADLS_ACUITY_SCORE: 25
ADLS_ACUITY_SCORE: 28
ADLS_ACUITY_SCORE: 25
ADLS_ACUITY_SCORE: 28
ADLS_ACUITY_SCORE: 25
ADLS_ACUITY_SCORE: 28
ADLS_ACUITY_SCORE: 25
ADLS_ACUITY_SCORE: 25
ADLS_ACUITY_SCORE: 28
ADLS_ACUITY_SCORE: 28
ADLS_ACUITY_SCORE: 25
ADLS_ACUITY_SCORE: 25
ADLS_ACUITY_SCORE: 28
ADLS_ACUITY_SCORE: 25
ADLS_ACUITY_SCORE: 28
ADLS_ACUITY_SCORE: 26
ADLS_ACUITY_SCORE: 25
ADLS_ACUITY_SCORE: 28
ADLS_ACUITY_SCORE: 25

## 2024-09-27 ASSESSMENT — VISUAL ACUITY: OU: GLASSES

## 2024-09-27 NOTE — CONSULTS
Care Management Initial Consult    General Information  Assessment completed with: Mili Jerry  Type of CM/SW Visit: Initial Assessment    Primary Care Provider verified and updated as needed: Yes   Readmission within the last 30 days: no previous admission in last 30 days   Reason for Consult: discharge planning  Advance Care Planning: Advance Care Planning Reviewed: present on chart       Communication Assessment  Patient's communication style: spoken language (English or Bilingual)    Hearing Difficulty or Deaf: no   Wear Glasses or Blind: yes    Cognitive  Cognitive/Neuro/Behavioral: WDL  Level of Consciousness: alert  Arousal Level: opens eyes spontaneously  Orientation: oriented x 4     Best Language: 0 - No aphasia  Speech: clear, spontaneous, logical    Living Environment:   People in home: facility resident     Current living Arrangements: assisted living  Name of Facility: [facility]   Able to return to prior arrangements: yes    Family/Social Support:  Care provided by: self  Provides care for: no one     Support system: Other (specify), Facility resident(s)/Staff (neice)          Description of Support System: Involved, Supportive    Support Assessment: Adequate family and caregiver support    Current Resources:   Patient receiving home care services: No     Community Resources: [facility] Worker (states she has a [facility] )  Equipment currently used at home: grab bar, toilet, grab bar, tub/shower, shower chair  Supplies currently used at home: Incontinence Supplies    Employment/Financial:  Employment Status: disabled        Financial Concerns:     Referral to Financial Worker: No     Does the patient's insurance plan have a 3 day qualifying hospital stay waiver?  No    Lifestyle & Psychosocial Needs:  Social Determinants of Health     Food Insecurity: Low Risk  (9/26/2024)    Food Insecurity     Within the past 12 months, did you worry that your food would run out  before you got money to buy more?: No     Within the past 12 months, did the food you bought just not last and you didn t have money to get more?: No   Depression: Not at risk (8/21/2024)    PHQ-2     PHQ-2 Score: 0   Recent Concern: Depression - At risk (5/29/2024)    PHQ-2     PHQ-2 Score: 4   Housing Stability: Low Risk  (9/26/2024)    Housing Stability     Do you have housing? : Yes     Are you worried about losing your housing?: No   Tobacco Use: Low Risk  (8/21/2024)    Patient History     Smoking Tobacco Use: Never     Smokeless Tobacco Use: Never     Passive Exposure: Not on file   Financial Resource Strain: Low Risk  (9/26/2024)    Financial Resource Strain     Within the past 12 months, have you or your family members you live with been unable to get utilities (heat, electricity) when it was really needed?: No   Alcohol Use: Not on file   Transportation Needs: Low Risk  (9/26/2024)    Transportation Needs     Within the past 12 months, has lack of transportation kept you from medical appointments, getting your medicines, non-medical meetings or appointments, work, or from getting things that you need?: No   Physical Activity: Not on file   Interpersonal Safety: Low Risk  (9/26/2024)    Interpersonal Safety     Do you feel physically and emotionally safe where you currently live?: Yes     Within the past 12 months, have you been hit, slapped, kicked or otherwise physically hurt by someone?: No     Within the past 12 months, have you been humiliated or emotionally abused in other ways by your partner or ex-partner?: No   Stress: Not on file   Social Connections: Unknown (11/13/2023)    Received from [facility], [facility] [facility]    Social Connections     Frequency of Communication with Friends and Family: Not on file   Health Literacy: Not on file     Functional Status:  Prior to admission patient needed assistance:   Dependent ADLs::  Independent  Dependent IADLs:: Cleaning, Meal Preparation, Laundry     Mental Health Status:  Mental Health Status: No Current Concerns       Chemical Dependency Status:  Chemical Dependency Status: No Current Concerns           Values/Beliefs:  Spiritual, Cultural Beliefs, Taoist Practices, Values that affect care: no             Discussed  Partnership in Safe Discharge Planning  document with patient/family: No    Additional Information:  Met with patient to complete initial care management assessment. Patient currently lives at [facility] in [facility]. Patient states she is mostly independent with ADLs. Staff provides stand by assist for showers in case she has a seizure. She manages her own medications. Staff assists with cleaning, meals and laundry. Her niece provides transportation and is anticipated to be her ride home at discharge. She denies current skilled home care services.     Call placed to [facility]. They state patient is able to return any day of the week at any time provided she does not have any increased needs or requires a nurse assessment. They would like discharge orders faxed to [phone]. They state they do not need nurse report as patient is so independent. They do not need medications sent to their preferred pharmacy as patient manages her own medications.    Next Steps:   RNCC will continue to follow.  Will need to keep [facility] updated on discharge plan and fax orders at time of discharge.    [facility]  [address], [address]  [facility]  Ph: [phone]     Zita Patiño, RN, BSN  6A RN Care Coordinator  Ph: [phone]   Vocera: 6A Neuro RNCC

## 2024-09-27 NOTE — PLAN OF CARE
Status: Admitted 9/26 for vEEG monitoring as pre-surgical evaluation. Hx of intractable focal epilepsy s/p VNS placement, depression, anxiety, HTN.  No events witnessed or reported this shift.   Vitals: HTN SBP 140s, OVSS on RA.   Neuros: Intact  IV: PIV SL   Resp/trach: WNL  Diet: Regular   Bowel status: 9/26  : Voiding spontaneously  Skin: Intact  Pain: Denied  Activity: SBA, GB, within arms reach.   Plan: Continue with current POC.   Education completed: Safety education completed this shift.       Seizure types:  Type 1: May have warning, not always. Stares, unresponsive and has been noted to have hand movements. Lasts about 2 minutes. She is amnestic for what transpires. After confused and may take 20 minutes to get back to baseline. Frequency unclear but patient feels like she notes periods of missed time nearly daily.      Type 2: May have a warning (description difficult to describe) as she as told her niece she is about to have a seizure before. Has stiffening, shaking, drooling. Eyes open. Lasts 4-5 minutes. Has bitten tongue and had urinary incontinence. After in confused, tired and sore. Sometimes she will wake up with a bitten tongue. Currently occurring once a months.      Triggers: stress, lack of sleep, overheated, bad weather

## 2024-09-27 NOTE — PLAN OF CARE
Goal Outcome Evaluation:      Plan of Care Reviewed With: patient    Overall Patient Progress: no changeOverall Patient Progress: no change    Outcome Evaluation: Plan to return to assisted living facility at discharge

## 2024-09-27 NOTE — PLAN OF CARE
Status: VEEG monitoring, no events witnessed or reported   Vitals: VSS on RA.  Neuros: Intact  IV: saline locked   Resp/trach: WNL  Diet: regular diet, good intake   Bowel status: BS+ BM 9/27  : VOids without difficulty   Skin: EEG leads intact.   Pain: Denies   Activity: SBA with seizure precautions  Plan: Keppra decreased, Continue with POC      Goal Outcome Evaluation:  Plan of Care Reviewed With: patient  Overall Patient Progress: no change  Outcome Evaluation: need to capture more events

## 2024-09-28 ENCOUNTER — APPOINTMENT (OUTPATIENT)
Dept: NEUROLOGY | Facility: CLINIC | Age: 59
End: 2024-09-28
Attending: PHYSICIAN ASSISTANT
Payer: COMMERCIAL

## 2024-09-28 PROCEDURE — 95714 VEEG EA 12-26 HR UNMNTR: CPT

## 2024-09-28 PROCEDURE — 120N000002 HC R&B MED SURG/OB UMMC

## 2024-09-28 PROCEDURE — 250N000013 HC RX MED GY IP 250 OP 250 PS 637: Performed by: PSYCHIATRY & NEUROLOGY

## 2024-09-28 PROCEDURE — 250N000013 HC RX MED GY IP 250 OP 250 PS 637: Performed by: PHYSICIAN ASSISTANT

## 2024-09-28 PROCEDURE — 95720 EEG PHY/QHP EA INCR W/VEEG: CPT | Performed by: PSYCHIATRY & NEUROLOGY

## 2024-09-28 PROCEDURE — 99231 SBSQ HOSP IP/OBS SF/LOW 25: CPT | Mod: 25 | Performed by: PSYCHIATRY & NEUROLOGY

## 2024-09-28 RX ADMIN — CARBAMAZEPINE 400 MG: 200 CAPSULE, EXTENDED RELEASE ORAL at 08:00

## 2024-09-28 RX ADMIN — LEVETIRACETAM 1250 MG: 750 TABLET, FILM COATED ORAL at 08:00

## 2024-09-28 RX ADMIN — ATENOLOL 50 MG: 25 TABLET ORAL at 07:59

## 2024-09-28 RX ADMIN — ATENOLOL 25 MG: 25 TABLET ORAL at 19:48

## 2024-09-28 RX ADMIN — Medication 1 TABLET: at 08:00

## 2024-09-28 RX ADMIN — CARBAMAZEPINE 400 MG: 200 CAPSULE, EXTENDED RELEASE ORAL at 19:49

## 2024-09-28 RX ADMIN — LEVETIRACETAM 1250 MG: 750 TABLET, FILM COATED ORAL at 19:48

## 2024-09-28 RX ADMIN — LORAZEPAM 0.5 MG: 0.5 TABLET ORAL at 19:48

## 2024-09-28 ASSESSMENT — ACTIVITIES OF DAILY LIVING (ADL)
ADLS_ACUITY_SCORE: 26
ADLS_ACUITY_SCORE: 27
ADLS_ACUITY_SCORE: 27
ADLS_ACUITY_SCORE: 26
ADLS_ACUITY_SCORE: 27
ADLS_ACUITY_SCORE: 26
ADLS_ACUITY_SCORE: 28
ADLS_ACUITY_SCORE: 27
ADLS_ACUITY_SCORE: 28
ADLS_ACUITY_SCORE: 27
ADLS_ACUITY_SCORE: 26
ADLS_ACUITY_SCORE: 27
ADLS_ACUITY_SCORE: 26

## 2024-09-28 NOTE — PROGRESS NOTES
"Time/length of seizure event:Pushed button at [id number]  Movements (head, eyes, extremities):No   Orientation during seizure:Disoriented   After seizure, remembers the unique phrase given during seizure:No  After seizure, remembers an unnamed visual object shown during seizure:No  Able to identify/name aloud item during seizure:No   Able to follow command during seizure:Yes some   Able to read test sentence aloud during seizure:No   Able to read test sentence aloud again after the seizure:No  After seizure, remembers name of object shown during seizure:No  Orientation and level of consciousness after seizure: Ativan 2mg IV  given at 1952, followed by saline flush. Unable to assess orientation. Pt did not answer any questions smiling at staff and saying \"yeah\" or nodding head.   VS and oxygen saturation during/after seizure:BP (!) 149/71 (BP Location: Left arm, Cuff Size: Adult Regular)   Pulse 63   Temp 99.7  F (37.6  C) (Oral)   Resp 18   LMP  (LMP Unknown)   SpO2 99%   Post ativan vs BP (!) 141/69   Pulse 63   Temp 99.7  F (37.6  C) (Oral)   Resp 18   LMP  (LMP Unknown)   SpO2 98%   10 mins later /66 (BP Location: Left arm, Patient Position: Left side, Cuff Size: Adult Regular)   Pulse 63   Temp 99.7  F (37.6  C) (Oral)   Resp 18   LMP  (LMP Unknown)   SpO2 98% Pt resting and turned to the side per self.  Appears asleep reaming on video monitoring and continuous pulse oximetry.   Recall of the event?:Not at this time.   Was this a typical seizure/event?:Unknown  Presence of aura or pre-seizure activity:Unknown   Incontinence:No    "

## 2024-09-28 NOTE — PROGRESS NOTES
NEUROLOGY/EPILEPSY ATTENDING NOTE    No seizures since admission.  Patient has no complaints.    We discussed her seizure types.  She reports seizures starting with a stereotyped but difficult to describe feeling and consisting of amnesia and is staring.  Not always certain when seizures occur.  She feels they are happening about 10 times per month.  She also describes seizures with the collapse, stiffening, and convulsive activity but these are infrequent.    When asked how seizures impact her life, she reports that they have decreased her vocational prospects and decreased her ability to socialize and to be independent.  She has never driven.  She is open to epilepsy surgery if this could be done relatively safely and had reasonable chance of controlling seizures.    Reports that she sets up her own medications in the med box.  She believes that her she takes her medications regularly.  Review of previous pharmacy evaluations indicates that she has been treated with all antiseizure medications other than zonisamide, gabapentin, pregabalin, rufinamide, and clobazam.    Reports low mood.  Feels that she is still grieving the passing of her father whom she cared for many years.  Reports early morning awakening.  Denies anhedonia.  Reports some anxiety.  Denies irritability.  Denies suicidal ideation.  Reports that she has family in the area that could help with recovery from surgery.    Current antiseizure medications: Levetiracetam 2500 mg/day, reduced from 3500 mg/day on admission.  Carbamazepine 800 mg/day, unchanged since admission.  Anticonvulsant levels on admission levetiracetam equal 29.8, carbamazepine equals 6.6.  Review of electronic medical record indicates that high therapeutic levetiracetam levels have not been achieved.    EXAM: Vital signs stable.  Heart exam without murmur.  No hemiatrophy.  She is pleasant and cooperative.  Fully oriented including date.  Short-term memory 3 of 3 items after  "distractor.  Spells \"world\" backwards correctly.  Able to name 10 animals in 30 seconds.  Visual fields are full.  Extraocular movements are intact without nystagmus.  Smile symmetrical.  Tongue midline.  No drift, pronation, or tremor.  Rapid fine movements are done well.  Finger finger-nose is done well.    EEG: Normal background.  Temporal wickets bilaterally.  Independent brief runs of polymorphic delta activity over both temporal regions.  No clear epileptiform activity.  Thus far, no seizures    PREVIOUS EVALUATION: Unable to find MRI results.  Ambulatory EEG found a single left frontal temporal seizure with propagation to the right temporal regions.  Independent bitemporal epileptiform activity.  Has not had neuropsychometric testing or PET scanning.    IMPRESSION  1) focal epilepsy, likely medically intractable.  Available evidence indicates seizures are refractory to high therapeutic carbamazepine levels.  She has been treated with many other antiseizure medications but not clear that she has had a fair trial of other medications.  She does appear to be refractory to a fair trial of the vagal nerve stimulator.  A single left temporal seizure has been recorded previously, unclear whether she has bitemporal epilepsy.  2) appears to have reasonable goals for more aggressive treatment.  Probably decisional but would require neuropsychometric testing.  3) does not appear to have major depression at present.  She follows with a therapist.    PLAN:  1) continue video EEG monitoring.  Goal is to record multiple seizures, would prefer at least four.  2) continue current carbamazepine.  Reduce levetiracetam to 750 mg twice daily starting today.  Warned patient about possibility of convulsive seizures and potential deleterious effects of convulsive seizures.  3) will require epilepsy directed MRI of brain, PET scan, neuropsychometric testing and possibly intracarotid Amytal testing if we will be proceeding with " resected epilepsy surgery.  If multifocal epilepsy, may be a candidate for DBS.  4) AED wise, zonisamide, pregabalin, gabapentin, and perhaps rufinamide and clobazam remain.  Zonisamide is only option that is likely to be helpful but should probably be avoided if intracarotid Amytal testing or seizure recording with intracranial electrodes will be contemplated.  Levetiracetam or lacosamide load could be used during seizure exacerbation.  5) consider psychiatric evaluation during this hospitalization.    Total time evaluating patient, reviewing chart, generating note today equal 32 minutes.    Dario Gillespie MD  Contact information for physicians covering Epilepsy and EEG is available on [facility].  Click search, enter neurology adult/[facility] in group name box, click on neurology adult/[facility], then click Staff Epilepsy and EEG.

## 2024-09-28 NOTE — PROGRESS NOTES
"NEUROLOGY/EPILEPSY ATTENDING NOTE    Seizure last evening. Patient reports she felt a warning and recalls pressing event marker. She has difficulty describing feeling stating \"it felt like seizure was coming on\". Got levetiracetam 1500 mg load afterwards and has had no further seizures. Denies tongue bite, low back pain, shortness of breath.    AEDs: levetiracetam 2500 mg/d (down from 3500 mg/d on admission).             Carbamazepine 800 mg/d  (no change since admission)    EXAM: Vitals unremarkable. Sats OK. Alert, fully oriented, conversant. Pleasant. EOMI. Smile symmetrical. Tongue midline. No drift, pronation, or tremor. FFN done well.    Video EEG: Clinically right arm automatism, left arm dystonic posturing. Staff then arrives and place patient on right side. Head deviates towards the right and there is some jerking that is bilateral. Disoriented afterward. EEG onset starts about 10 seconds after patient presses event marker. Bilateral theta at onset but left sided changes are transient,then well developed right temporal discharge, then spread bilaterally. After convulsive activity stops, right posterior hemisphere discharge persists, eventually evolving left hemispheric seizure emerges and terminates. Total ictal change more than two minutes.    Sodium, LFT, CBC on admission normal.    IMPRESSION  1) Focal epilepsy. Clinical and EEG changes indicate right temporal onset to this seizure but evolution of EEG suggests significant bilateral seizure tendency. Previously recorded seizure reportedly had left temporal onset; will need to review. She may have bilateral independent seizures.  2) Bilateral tonic clonic seizure after minor reduction of levetiracetam form 3500 mg to 2500 mg/d, while treated therapeutic doses of carbamazepine.    PLAN:  Continue levetiracetam 2500 mg/d, carbamazepine 800 mg per day for now. If no further seizures through today consider reducing levetiracetam again. Would like at least " four seizures but would hope to avoid multiple convulsions.    Total time today 30 minutes.    Dario Gillespie MD  Contact information for physicians covering Epilepsy and EEG is available on [facility].  Click search, enter neurology adult/[facility] in group name box, click on neurology adult/[facility], then click Staff Epilepsy and EEG.   (4) rarely moist

## 2024-09-28 NOTE — PLAN OF CARE
Status: Admitted 9/26 for vEEG monitoring as pre-surgical evaluation. Hx of intractable focal epilepsy s/p VNS placement, depression, anxiety, HTN. 1 event reported this shift. See previous notes.   Vitals: /56 (BP Location: Left arm, Patient Position: Sitting, Cuff Size: Adult Regular)   Pulse 68   Temp 98.3  F (36.8  C) (Axillary)   Resp 20   LMP  (LMP Unknown)   SpO2 95%   Neuros: Intact alert and oriented x4. No neuro change during the night.  IV: PIV SL  Labs/Electrolytes:  See labs and results for updated values.   Resp/trach: RA, denies SOB at rest or w/activity.   Diet: Reg diet. Denies N/V.   Bowel status: Reported passing gas no BM during the night.   : Voiding spontaneously.   Skin: Skin intact, pt is able to shift weight in bed independently.   Pain: Denies pain and discomfort.   Activity: SBA in room, bed-alarm for safety precautions, call light within reach.   Plan: Continue point of care. EEG Video monitoring in place. CM/SW following disposition needs and discharge planning. Pt is able to make needs known. Up in room with SBA in restroom.        Intake/Output Summary (Last 24 hours) at 9/28/2024 0617  Last data filed at 9/28/2024 0616  Gross per 24 hour   Intake 1653 ml   Output --   Net 1653 ml         Goal Outcome Evaluation:  No Change       Plan of Care Reviewed With: patient    Overall Patient Progress: no change

## 2024-09-28 NOTE — PROGRESS NOTES
NEUROLOGY ATTENDING    Bilateral tonic clonic seizure within last hour. Preliminary EEG read suggests right temporal onset. Given bilateral tonic clonic seizure will administer 1500 mg levetiracetam load now and increase levetiracetam back to 1250 mg twice a day (that is discontinue planned levetiracetam reduction). Tentative appearance is that patient may be sensitive to minor levetiracetam reductions.    Dario Gillespie MD  Contact information for physicians covering Epilepsy and EEG is available on [facility].  Click search, enter neurology adult/[facility] in group name box, click on neurology adult/[facility], then click Staff Epilepsy and EEG.

## 2024-09-28 NOTE — PLAN OF CARE
Status: VEEG monitoring, no events witnessed or reported this shift.   Vitals: VSS on RA.  Neuros: Intact  IV: saline locked   Resp/trach: WNL  Diet: regular diet, good intake   Bowel status: BS+ BM 9/28  : Voids without difficulty   Skin: EEG leads intact.   Pain: Denies   Activity: SBA with seizure precautions. Up in chair and walked halls.   Plan: No med changes today, Continue with POC.    Goal Outcome Evaluation:    Plan of Care Reviewed With: patient  Overall Patient Progress: no change  Outcome Evaluation: continue to capture events

## 2024-09-29 ENCOUNTER — APPOINTMENT (OUTPATIENT)
Dept: NEUROLOGY | Facility: CLINIC | Age: 59
DRG: 101 | End: 2024-09-29
Attending: PHYSICIAN ASSISTANT
Payer: COMMERCIAL

## 2024-09-29 LAB — GLUCOSE BLDC GLUCOMTR-MCNC: 101 MG/DL (ref 70–99)

## 2024-09-29 PROCEDURE — 120N000002 HC R&B MED SURG/OB UMMC

## 2024-09-29 PROCEDURE — 99231 SBSQ HOSP IP/OBS SF/LOW 25: CPT | Mod: 25 | Performed by: PSYCHIATRY & NEUROLOGY

## 2024-09-29 PROCEDURE — 250N000013 HC RX MED GY IP 250 OP 250 PS 637: Performed by: PSYCHIATRY & NEUROLOGY

## 2024-09-29 PROCEDURE — 250N000013 HC RX MED GY IP 250 OP 250 PS 637: Performed by: PHYSICIAN ASSISTANT

## 2024-09-29 PROCEDURE — 95720 EEG PHY/QHP EA INCR W/VEEG: CPT | Performed by: PSYCHIATRY & NEUROLOGY

## 2024-09-29 PROCEDURE — 95714 VEEG EA 12-26 HR UNMNTR: CPT

## 2024-09-29 RX ORDER — LEVETIRACETAM 750 MG/1
750 TABLET ORAL 2 TIMES DAILY
Status: DISCONTINUED | OUTPATIENT
Start: 2024-09-29 | End: 2024-10-01

## 2024-09-29 RX ADMIN — CARBAMAZEPINE 400 MG: 200 CAPSULE, EXTENDED RELEASE ORAL at 09:11

## 2024-09-29 RX ADMIN — ATENOLOL 50 MG: 25 TABLET ORAL at 09:11

## 2024-09-29 RX ADMIN — ATENOLOL 25 MG: 25 TABLET ORAL at 20:00

## 2024-09-29 RX ADMIN — Medication 1 TABLET: at 09:11

## 2024-09-29 RX ADMIN — LEVETIRACETAM 750 MG: 750 TABLET, FILM COATED ORAL at 20:00

## 2024-09-29 RX ADMIN — CARBAMAZEPINE 400 MG: 200 CAPSULE, EXTENDED RELEASE ORAL at 20:02

## 2024-09-29 RX ADMIN — LORAZEPAM 0.5 MG: 0.5 TABLET ORAL at 20:00

## 2024-09-29 RX ADMIN — LEVETIRACETAM 1250 MG: 750 TABLET, FILM COATED ORAL at 09:11

## 2024-09-29 ASSESSMENT — ACTIVITIES OF DAILY LIVING (ADL)
ADLS_ACUITY_SCORE: 26

## 2024-09-29 NOTE — PROGRESS NOTES
NEUROLOGY/EPILEPSY ATTENDING NOTE    No seizures since yesterday.  No complaints.    AEDs: levetiracetam 2500 mg/d (down from 3500 mg/d on admission).             Carbamazepine 800 mg/d  (no change since admission).    EXAM: Vitals unremarkable. Sats OK. Alert, fully oriented, conversant. Pleasant. EOMI. Smile symmetrical. Tongue midline. No drift, pronation, or tremor. FFN done well.    EEG: Normal background.  No persistent focal slowing.  No epileptiform discharges.  No seizures.    We spent about 10 minutes discussing various more aggressive options.  This includes resective surgery, neuro ablative surgery, deep brain stimulator and responsive neurostimulator.  Advantages and disadvantages of each were briefly reviewed.    IMPRESSION:  Focal epilepsy.  Single seizures so far with clinical clinical and EEG changes indicating right temporal onset but evolution of EEG suggests significant bilateral seizure tendency. Previously recorded seizure reportedly had left temporal onset; will need to review. She may have bilateral independent seizures.    PLAN:  1) continue video EEG monitoring.  Our goal is to record at least 4 seizures.  2) reduce levetiracetam to 750 mg twice daily.  Warned patient that convulsive seizures may recur.  If this happens, we may choose to continue with higher doses of levetiracetam and taper carbamazepine instead.  3) short-term treatment may include further increasing levetiracetam dose to achieve the levels in the high 30s.  Zonisamide, pregabalin, gabapentin, rufinamide have not yet been used.  Send this might should probably be avoided if intracarotid Amytal testing or intracranial recording is being contemplated in the near future.  Will need epilepsy directed MRI, PET scanning, neuropsychometric testing, and possibly intracarotid Amytal testing if surgical treatment is to be further pursued.    Discussed with the nursing staff.  Total time today 28 minutes.    Dario Gillespie,  MD  Contact information for physicians covering Epilepsy and EEG is available on [facility].  Click search, enter neurology adult/[facility] in group name box, click on neurology adult/[facility], then click Staff Epilepsy and EEG.

## 2024-09-29 NOTE — PLAN OF CARE
Status: VEEG monitoring, no events witnessed or reported this shift.   Vitals: VSS on RA.  Neuros: Intact  IV: saline locked   Resp/trach: WNL  Diet: regular diet, good intake   Bowel status: BS+ BM 9/29  : Voids without difficulty   Skin: EEG leads intact.   Pain: Denies   Activity: SBA with seizure precautions. Up in chair and walked halls.   Plan: Keppra decreased again starting this evening, COntinue with POC.     Goal Outcome Evaluation:  Plan of Care Reviewed With: patient  Overall Patient Progress: no change  Outcome Evaluation: continue to capture events

## 2024-09-29 NOTE — PLAN OF CARE
Status: VEEG monitoring, no events witnessed or reported this shift.   Vitals: VSS on RA. Continuous pulse ox NOC  Neuros: intact  IV: PIV SL  Resp/trach: WNL  Diet: regular  Bowel status: BM 9/28  : voiding spont   Skin: EEG leads intact   Pain: denies  Activity: SBA, GB. Seizure precautions.   Plan: Continue with decreased keppra dose. Continue POC.       Goal Outcome Evaluation:      Plan of Care Reviewed With: patient    Overall Patient Progress: no changeOverall Patient Progress: no change    Outcome Evaluation: Continue to capture events.

## 2024-09-30 ENCOUNTER — APPOINTMENT (OUTPATIENT)
Dept: NEUROLOGY | Facility: CLINIC | Age: 59
DRG: 101 | End: 2024-09-30
Attending: PHYSICIAN ASSISTANT
Payer: COMMERCIAL

## 2024-09-30 LAB — GLUCOSE BLDC GLUCOMTR-MCNC: 111 MG/DL (ref 70–99)

## 2024-09-30 PROCEDURE — 99222 1ST HOSP IP/OBS MODERATE 55: CPT | Performed by: PSYCHIATRY & NEUROLOGY

## 2024-09-30 PROCEDURE — 95714 VEEG EA 12-26 HR UNMNTR: CPT

## 2024-09-30 PROCEDURE — 999N000128 HC STATISTIC PERIPHERAL IV START W/O US GUIDANCE

## 2024-09-30 PROCEDURE — 250N000013 HC RX MED GY IP 250 OP 250 PS 637: Performed by: PSYCHIATRY & NEUROLOGY

## 2024-09-30 PROCEDURE — 99232 SBSQ HOSP IP/OBS MODERATE 35: CPT | Performed by: PHYSICIAN ASSISTANT

## 2024-09-30 PROCEDURE — 250N000013 HC RX MED GY IP 250 OP 250 PS 637: Performed by: PHYSICIAN ASSISTANT

## 2024-09-30 PROCEDURE — 120N000002 HC R&B MED SURG/OB UMMC

## 2024-09-30 PROCEDURE — 95720 EEG PHY/QHP EA INCR W/VEEG: CPT | Performed by: PSYCHIATRY & NEUROLOGY

## 2024-09-30 RX ADMIN — Medication 1 TABLET: at 08:03

## 2024-09-30 RX ADMIN — ATENOLOL 50 MG: 25 TABLET ORAL at 08:04

## 2024-09-30 RX ADMIN — ATENOLOL 25 MG: 25 TABLET ORAL at 20:23

## 2024-09-30 RX ADMIN — CARBAMAZEPINE 400 MG: 200 CAPSULE, EXTENDED RELEASE ORAL at 20:23

## 2024-09-30 RX ADMIN — LEVETIRACETAM 750 MG: 750 TABLET, FILM COATED ORAL at 08:03

## 2024-09-30 RX ADMIN — LEVETIRACETAM 750 MG: 750 TABLET, FILM COATED ORAL at 20:23

## 2024-09-30 RX ADMIN — CARBAMAZEPINE 400 MG: 200 CAPSULE, EXTENDED RELEASE ORAL at 08:03

## 2024-09-30 RX ADMIN — LORAZEPAM 0.5 MG: 0.5 TABLET ORAL at 20:23

## 2024-09-30 ASSESSMENT — ACTIVITIES OF DAILY LIVING (ADL)
ADLS_ACUITY_SCORE: 26
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
## 2024-09-30 NOTE — PLAN OF CARE
Time/length of seizure event: 7 minutes  Movements (head, eyes, extremities):n/a  Orientation during seizure:n/a  After seizure, remembers the unique phrase given during seizure:n/a  After seizure, remembers an unnamed visual object shown during seizure:n/a  Able to identify/name aloud item during seizure:n/a  Able to follow command during seizure:n/a  Able to read test sentence aloud during seizure:n/a  Able to read test sentence aloud again after the seizure:n/a  After seizure, remembers name of object shown during seizure:n/a  Orientation and level of consciousness after seizure:A&04  VS and oxygen saturation during/after seizure:/77 O2 100 HR 73  Recall of the event?:no  Was this a typical seizure/event?:yes  Presence of aura or pre-seizure activity:n/a  Incontinence:no

## 2024-09-30 NOTE — PLAN OF CARE
Status: admitted for VEEG monitoring for pre-surgical workup  Vitals: VSS  Neuros: intact  IV: PIV SL'd  Labs/Electrolytes: WNL  Resp/trach: WNL  Diet: tolerating reg  without difficulty  Bowel status: LBM   : voiding without difficulty, continent  Skin: intact, VEEG leads in place  Pain: denied  Activity: up with SBA and GB, ambulated in hallways this afternoon, hygiene break this AM  Social: no visitors or phone calls this shift  Plan: continue to monitor for events  Updates this shift: pt had one event this morning, see writer's earlier note

## 2024-09-30 NOTE — PLAN OF CARE
"Time/length of seizure event:0918 approx 1 min  Movements (head, eyes, extremities):restless, whole body  Orientation during seizure:DIONTE, pt not   After seizure, remembers the unique phrase given during seizure:n/a  After seizure, remembers an unnamed visual object shown during seizure:n/a  Able to identify/name aloud item during seizure:n/a  Able to follow command during seizure:n/a  Able to read test sentence aloud during seizure:n/a  Able to read test sentence aloud again after the seizure:yes  After seizure, remembers name of object shown during seizure: n/a  Orientation and level of consciousness after seizure:yes  VS and oxygen saturation during/after seizure:intact  Recall of the event?:yes  Was this a typical seizure/event?:yes  Presence of aura or pre-seizure activity:yes, \"weird feeling\"  Incontinence:no        "

## 2024-09-30 NOTE — PROGRESS NOTES
[facility], [place]   Epilepsy Service Daily Note      Interval History:   Had seizure during hygiene break this morning. Also had seizure while in bathroom last night at 2132. She doesn't recall much of either one.     Review of System:   No nausea, No vomiting, no headaches, no dizziness, no chest pain.     Medications:   Antiepileptic Medications Home Doses: levetiracetam 6132-3947, carbatrol 400-400  Antiepileptic Medications Current Doses: levetiracetam 750-750, carbatrol 400-400    Exam: Blood pressure (!) 150/77, pulse 61, temperature 98.4  F (36.9  C), temperature source Oral, resp. rate 18, SpO2 96%, not currently breastfeeding.  General: NAD  Head: NC/AT  Extremities: no LE edema  Neuro:  Alert and oriented. Speech fluent. Hearing intact to normal conversation. PERRL, EOM's intact. Face symmetric, tongue midline. Strong shoulder shrug bilaterally. Strength 5/5 bilaterally. No drift or pronation. Intact FNF. Sensation intact to light touch bilaterally.    EEG: rare right hemispheric epileptiform discharges. a focal electroclinical seizure leading to a partial generalization was recorded on 9/27. EEG showed poorly lateralized onset but a well-developed early right temporal discharge before bilateral spread and EEG features suggesting bilateral tonic-clonic seizure.   Labs: 9/26/24 carbamazepine 6.6, levetiracetam 29.8     Assessment and Plan: discussed with Dr. Milton Montero.Patient is a 59 year old, right-handed female with a history of intractable focal epilepsy s/p VNS placement, depression, anxiety and HTN who is being admitted for vEEG monitoring for presurgical evaluation. One seizure recorded on EEG 9/27, another recorded 9/29 (but fair amount of artifact as patient in restroom and unfortunately off camera) and she had a third seizure this AM but was off EEG for hygiene break.       -continue vEEG monitoring  -continue lower levetiracetam and PTA carbatrol   -psychiatry  consult      Padmini Redmond PA-C    Type of target event identified: staring spell with altered awareness and convulsion   Number of events: more needed  Discharge medication plan: To be decided  Further Imaging studies needed prior to discharge: TBD-needs MRI  Discharge transportation: family to provide  Other pertinent issues/goals for discharge: psychiatry consult       Total time: 35 minute was spent in the care of this patient. The patient agrees with the above mentioned plan of care. I answered all the patient's questions and addressed immediate concerns. More than 50% of time spent consisted of counseling and coordinating care, including discussion of the diagnostic significance of EEG findings, anti-seizure medication management, and planning for discharge home

## 2024-09-30 NOTE — PLAN OF CARE
Status: VEEG monitoring  Vitals: VSS on RA.  Neuros: Intact  IV: saline locked   Resp/trach: WNL  Diet: regular diet, good intake   Bowel status: BS+ BM 9/29  : Voids without difficulty   Skin: EEG leads intact.   Pain: Denies   Activity: SBA with seizure precautions. Up in chair and walked halls.   Plan: Keppra decreased again starting yesterday COntinue with POC. 1 Event yesterday evening.

## 2024-10-01 ENCOUNTER — APPOINTMENT (OUTPATIENT)
Dept: NEUROLOGY | Facility: CLINIC | Age: 59
DRG: 101 | End: 2024-10-01
Attending: PHYSICIAN ASSISTANT
Payer: COMMERCIAL

## 2024-10-01 PROCEDURE — 250N000013 HC RX MED GY IP 250 OP 250 PS 637: Performed by: PSYCHIATRY & NEUROLOGY

## 2024-10-01 PROCEDURE — 95720 EEG PHY/QHP EA INCR W/VEEG: CPT | Performed by: PSYCHIATRY & NEUROLOGY

## 2024-10-01 PROCEDURE — G0008 ADMIN INFLUENZA VIRUS VAC: HCPCS | Performed by: PSYCHIATRY & NEUROLOGY

## 2024-10-01 PROCEDURE — 250N000013 HC RX MED GY IP 250 OP 250 PS 637: Performed by: PHYSICIAN ASSISTANT

## 2024-10-01 PROCEDURE — 250N000011 HC RX IP 250 OP 636: Performed by: PSYCHIATRY & NEUROLOGY

## 2024-10-01 PROCEDURE — 95714 VEEG EA 12-26 HR UNMNTR: CPT

## 2024-10-01 PROCEDURE — 90673 RIV3 VACCINE NO PRESERV IM: CPT | Performed by: PSYCHIATRY & NEUROLOGY

## 2024-10-01 PROCEDURE — 99231 SBSQ HOSP IP/OBS SF/LOW 25: CPT | Performed by: PHYSICIAN ASSISTANT

## 2024-10-01 PROCEDURE — 120N000002 HC R&B MED SURG/OB UMMC

## 2024-10-01 RX ORDER — LEVETIRACETAM 500 MG/1
500 TABLET ORAL 2 TIMES DAILY
Status: DISCONTINUED | OUTPATIENT
Start: 2024-10-01 | End: 2024-10-02

## 2024-10-01 RX ADMIN — ATENOLOL 50 MG: 25 TABLET ORAL at 08:29

## 2024-10-01 RX ADMIN — CARBAMAZEPINE 400 MG: 200 CAPSULE, EXTENDED RELEASE ORAL at 20:00

## 2024-10-01 RX ADMIN — INFLUENZA A VIRUS A/WEST VIRGINIA/30/2022 (H1N1) RECOMBINANT HEMAGGLUTININ ANTIGEN, INFLUENZA A VIRUS A/MASSACHUSETTS/18/2022 (H3N2) RECOMBINANT HEMAGGLUTININ ANTIGEN, AND INFLUENZA B VIRUS B/AUSTRIA/1359417/2021 RECOMBINANT HEMAGGLUTININ ANTIGEN 0.5 ML: 45; 45; 45 INJECTION INTRAMUSCULAR at 13:14

## 2024-10-01 RX ADMIN — LEVETIRACETAM 500 MG: 500 TABLET, FILM COATED ORAL at 20:00

## 2024-10-01 RX ADMIN — Medication 1 TABLET: at 08:28

## 2024-10-01 RX ADMIN — LORAZEPAM 0.5 MG: 0.5 TABLET ORAL at 20:00

## 2024-10-01 RX ADMIN — ATENOLOL 25 MG: 25 TABLET ORAL at 20:00

## 2024-10-01 RX ADMIN — LEVETIRACETAM 750 MG: 750 TABLET, FILM COATED ORAL at 08:28

## 2024-10-01 RX ADMIN — CARBAMAZEPINE 400 MG: 200 CAPSULE, EXTENDED RELEASE ORAL at 08:29

## 2024-10-01 ASSESSMENT — ACTIVITIES OF DAILY LIVING (ADL)
ADLS_ACUITY_SCORE: 25
ADLS_ACUITY_SCORE: 25
ADLS_ACUITY_SCORE: 26
ADLS_ACUITY_SCORE: 25
ADLS_ACUITY_SCORE: 26
ADLS_ACUITY_SCORE: 26
ADLS_ACUITY_SCORE: 25
ADLS_ACUITY_SCORE: 26
ADLS_ACUITY_SCORE: 25
ADLS_ACUITY_SCORE: 26
ADLS_ACUITY_SCORE: 25
ADLS_ACUITY_SCORE: 26
ADLS_ACUITY_SCORE: 25

## 2024-10-01 NOTE — PROGRESS NOTES
Care Management Follow Up    Length of Stay (days): 5    Expected Discharge Date: 10/01/2024     Concerns to be Addressed: discharge planning     Patient plan of care discussed at interdisciplinary rounds: Yes    Anticipated Discharge Disposition: Assisted Living  Anticipated Discharge Services: None  Anticipated Discharge DME: None    Patient/family educated on Medicare website which has current facility and service quality ratings: No  Education Provided on the Discharge Plan: Yes  Patient/Family in Agreement with the Plan: Yes    Referrals Placed by CM/SW:  NA  Private pay costs discussed: Not applicable    Discussed  Partnership in Safe Discharge Planning  document with patient/family: Not at this time    Handoff Completed: No, handoff not indicated or clinically appropriate at this time    Additional Information:  Patient status reviewed, discussed in discharge rounds. Patient continues on vEEG monitoring and is not medically ready for discharge.     Call received from Suki, Case Coordinator with [facility], ph: [phone]. Suki would like an update when patient discharges from the hospital. Suki states she will update patients CADI Franco CHUN (ph: [phone]).    Next Steps:   RNCC will continue to follow. Will assist is coordinating return to [facility] when patient is medically ready for discharge.    [facility]  [address], [address]  [facility]  Ph: [phone]   Fax: [phone]      Zita Patiño, RN, BSN  6A RN Care Coordinator  Ph: [phone]   Vocera: 6A Neuro RNCC

## 2024-10-01 NOTE — PLAN OF CARE
RN [id number]    Goal Outcome Evaluation:     Plan of Care Reviewed With: patient.  Overall Patient Progress: no change.    Admitted 9/26 for vEEG monitoring as pre-surgical evaluation. Hx of intractable focal epilepsy s/p VNS placement, depression, anxiety, HTN.      Vitals: Afebrile. Hypertensive within parameters. OVSS on RA  Neuro: A&Ox4;baseline blurry vision   Mobility: Pt moves SBA w/ GB. BA  Pain/Nausea: Denies pain. Denies nausea.   Diet & GI: Tolerating regular diet with good appetite. LBM date 9/30  Labs: Reviewed.   LDAs: R PIV SL  Skin/incisions: WNL; intact  Respiratory: No acute changes this shift. Lung sounds clear, no SOB noted.   Cardiac: No acute changes this shift.  : Voiding appropriately and spontaneously.     NEW CHANGES: No acute changes this shift. VEEG leads in place; no events witnessed or reported this shift. Continue vEEG monitoring.    Continue to implement Plan of Care.    Safia Sue RN

## 2024-10-01 NOTE — PLAN OF CARE
VSS on RA.  Denied pain.  Neuros intact.  VEEG leads in place; no events witnessed or reported this shift.  R PIV SL.  On a regular diet.  Voiding spontaneously.  Last BM 9/30.  Up SBA and GB-within arms reach at all times.  On decreased dose of PTA Keppra, remains on full dose of Carbatrol.  Back to [facility] when medically ready.  Continue with POC.        Goal Outcome Evaluation:      Plan of Care Reviewed With: patient    Overall Patient Progress: no change    Outcome Evaluation: EEG ongoing

## 2024-10-01 NOTE — PROGRESS NOTES
[facility], [place]   Epilepsy Service Daily Note      Interval History:   No further seizures. No concerns.     Review of System:   No nausea, No vomiting, no headaches, no dizziness, no chest pain.     Medications:   Antiepileptic Medications Home Doses: levetiracetam 2252-4085, carbatrol 400-400  Antiepileptic Medications Current Doses: levetiracetam 750-750, carbatrol 400-400    Exam: Blood pressure (!) 157/80, pulse 67, temperature 98.5  F (36.9  C), temperature source Oral, resp. rate 16, SpO2 97%, not currently breastfeeding.  General: NAD  Head: NC/AT  Extremities: no LE edema  Neuro:  Alert and oriented. Speech fluent. Hearing intact to normal conversation. PERRL, EOM's intact. Face symmetric, tongue midline. Strong shoulder shrug bilaterally. Strength 5/5 bilaterally. No drift or pronation. Intact FNF. Sensation intact to light touch bilaterally.    EEG: rare right hemispheric epileptiform discharges. a focal electroclinical seizure leading to a partial generalization was recorded on 9/27. EEG showed poorly lateralized onset but a well-developed early right temporal discharge before bilateral spread and EEG features suggesting bilateral tonic-clonic seizure.   Labs: 9/26/24 carbamazepine 6.6, levetiracetam 29.8     Assessment and Plan: patient seen and discussed with Dr. Milton Montero.Patient is a 59 year old, right-handed female with a history of intractable focal epilepsy s/p VNS placement, depression, anxiety and HTN who is being admitted for vEEG monitoring for presurgical evaluation. One seizure recorded on EEG 9/27, another recorded 9/29 (but fair amount of artifact as patient in restroom and unfortunately off camera) and she had a third seizure this AM but was off EEG for hygiene break.       -continue vEEG monitoring  -continue PTA carbatrol   -decrease levetiracetam to 500-500      Padmini Redmond PA-C    Type of target event identified: staring spell with altered  awareness and convulsion   Number of events: more needed  Discharge medication plan: To be decided  Further Imaging studies needed prior to discharge: TBD-needs MRI  Discharge transportation: family to provide  Other pertinent issues/goals for discharge: psychiatry consult       Total time: 25 minute was spent in the care of this patient. The patient agrees with the above mentioned plan of care. I answered all the patient's questions and addressed immediate concerns. More than 50% of time spent consisted of counseling and coordinating care, including discussion of the diagnostic significance of EEG findings, anti-seizure medication management, and planning for discharge home

## 2024-10-01 NOTE — PLAN OF CARE
Status: Admitted 9/26 for vEEG monitoring, no events witnessed/reported  Vitals: VSS on RA  Neuros: A&Ox4, baseline blurry vision  IV: PIV SL  Labs/Electrolytes: WNL  Resp/trach: WNL  Diet: Regular, good PO  Bowel status: LBM 9/30  : Voiding  Skin: intact  Pain: Denies  Activity: SBA, GB. Hygiene break this AM  Social: No visitors  Plan/updates: On decreased dose of PO keppra, continue POC

## 2024-10-02 ENCOUNTER — APPOINTMENT (OUTPATIENT)
Dept: NEUROLOGY | Facility: CLINIC | Age: 59
DRG: 101 | End: 2024-10-02
Attending: PHYSICIAN ASSISTANT
Payer: COMMERCIAL

## 2024-10-02 PROCEDURE — 95720 EEG PHY/QHP EA INCR W/VEEG: CPT | Performed by: PSYCHIATRY & NEUROLOGY

## 2024-10-02 PROCEDURE — 99231 SBSQ HOSP IP/OBS SF/LOW 25: CPT | Performed by: PHYSICIAN ASSISTANT

## 2024-10-02 PROCEDURE — 95714 VEEG EA 12-26 HR UNMNTR: CPT

## 2024-10-02 PROCEDURE — 250N000013 HC RX MED GY IP 250 OP 250 PS 637: Performed by: PHYSICIAN ASSISTANT

## 2024-10-02 PROCEDURE — 120N000002 HC R&B MED SURG/OB UMMC

## 2024-10-02 RX ADMIN — ATENOLOL 25 MG: 25 TABLET ORAL at 20:18

## 2024-10-02 RX ADMIN — LEVETIRACETAM 500 MG: 500 TABLET, FILM COATED ORAL at 09:03

## 2024-10-02 RX ADMIN — Medication 1 TABLET: at 09:03

## 2024-10-02 RX ADMIN — ATENOLOL 50 MG: 25 TABLET ORAL at 09:03

## 2024-10-02 RX ADMIN — LORAZEPAM 0.5 MG: 0.5 TABLET ORAL at 20:18

## 2024-10-02 RX ADMIN — CARBAMAZEPINE 400 MG: 200 CAPSULE, EXTENDED RELEASE ORAL at 20:18

## 2024-10-02 RX ADMIN — CARBAMAZEPINE 400 MG: 200 CAPSULE, EXTENDED RELEASE ORAL at 09:03

## 2024-10-02 ASSESSMENT — ACTIVITIES OF DAILY LIVING (ADL)
ADLS_ACUITY_SCORE: 26
ADLS_ACUITY_SCORE: 26
ADLS_ACUITY_SCORE: 25
ADLS_ACUITY_SCORE: 25
ADLS_ACUITY_SCORE: 26
ADLS_ACUITY_SCORE: 26
ADLS_ACUITY_SCORE: 25
ADLS_ACUITY_SCORE: 26
ADLS_ACUITY_SCORE: 26
ADLS_ACUITY_SCORE: 25
ADLS_ACUITY_SCORE: 26
ADLS_ACUITY_SCORE: 25
ADLS_ACUITY_SCORE: 25
ADLS_ACUITY_SCORE: 26

## 2024-10-02 NOTE — PLAN OF CARE
Status: Admitted 9/26 for vEEG monitoring, no events witnessed/reported  Vitals: VSS on RA  Neuros: A&Ox4, baseline blurry vision  IV: PIV SL  Labs/Electrolytes: WNL  Resp/trach: WNL  Diet: Regular, good PO  Bowel status: LBM 9/30  : Voiding  Skin: intact  Pain: Denies  Activity: SBA, GB. Hygiene break this AM  Plan: Continue to monitor and follow POC.

## 2024-10-02 NOTE — PLAN OF CARE
Status: VEEG monitoring. No events witnessed or reported.  Vitals: VSS   Neuros: AOx4. Denies blurry vision.   IV: saline locked   Resp/trach: WNL   Diet: regular   Bowel status: BM 10/2.  : Voids without difficulty   Skin: EEG leads intact.   Pain: denies   Activity: SBA seizure precautions  Plan: Off keppra, continue with POC.     Goal Outcome Evaluation:  Plan of Care Reviewed With: patient  Overall Patient Progress: improving  Outcome Evaluation: EEG continued, off keppra

## 2024-10-02 NOTE — PROGRESS NOTES
[facility], [place]   Epilepsy Service Daily Note      Interval History:   No further seizures. No concerns. Tolerating admission well.     Review of System:   No nausea, No vomiting, no headaches, no dizziness, no chest pain.     Medications:   Antiepileptic Medications Home Doses: levetiracetam 9477-2572, carbatrol 400-400  Antiepileptic Medications Current Doses: levetiracetam 500-500, carbatrol 400-400    Exam: Blood pressure 109/70, pulse 78, temperature 98.1  F (36.7  C), temperature source Oral, resp. rate 16, SpO2 100%, not currently breastfeeding.  General: NAD  Head: NC/AT  Extremities: no LE edema  Neuro:  Alert and oriented. Speech fluent. Hearing intact to normal conversation. PERRL, EOM's intact. Face symmetric, tongue midline. Strong shoulder shrug bilaterally. Strength 5/5 bilaterally. No drift or pronation. Intact FNF. Sensation intact to light touch bilaterally.    EEG: rare right hemispheric epileptiform discharges. a focal electroclinical seizure leading to a partial generalization was recorded on 9/27. EEG showed poorly lateralized onset but a well-developed early right temporal discharge before bilateral spread and EEG features suggesting bilateral tonic-clonic seizure.   Labs: 9/26/24 carbamazepine 6.6, levetiracetam 29.8     Assessment and Plan: patient seen and discussed with Dr. Milton Montero.Patient is a 59 year old, right-handed female with a history of intractable focal epilepsy s/p VNS placement, depression, anxiety and HTN who is being admitted for vEEG monitoring for presurgical evaluation. One seizure recorded on EEG 9/27, another recorded 9/29 (but fair amount of artifact as patient in restroom and unfortunately off camera) and she had a third seizure this 9/30 in the AM but was off EEG for hygiene break. No further seizures since then despite further decreases in levetiracetam.       -continue vEEG monitoring  -continue PTA carbatrol   -stop levetiracetam        Padmini Redmond PA-C    Type of target event identified: staring spell with altered awareness and convulsion   Number of events: more needed  Discharge medication plan: To be decided  Further Imaging studies needed prior to discharge: TBD-needs MRI  Discharge transportation: family to provide  Other pertinent issues/goals for discharge: psychiatry consult       Total time: 25 minute was spent in the care of this patient. The patient agrees with the above mentioned plan of care. I answered all the patient's questions and addressed immediate concerns. More than 50% of time spent consisted of counseling and coordinating care, including discussion of the diagnostic significance of EEG findings, anti-seizure medication management, and planning for discharge home

## 2024-10-03 ENCOUNTER — APPOINTMENT (OUTPATIENT)
Dept: NEUROLOGY | Facility: CLINIC | Age: 59
DRG: 101 | End: 2024-10-03
Attending: PHYSICIAN ASSISTANT
Payer: COMMERCIAL

## 2024-10-03 LAB — GLUCOSE BLDC GLUCOMTR-MCNC: 172 MG/DL (ref 70–99)

## 2024-10-03 PROCEDURE — 250N000013 HC RX MED GY IP 250 OP 250 PS 637: Performed by: PHYSICIAN ASSISTANT

## 2024-10-03 PROCEDURE — 99232 SBSQ HOSP IP/OBS MODERATE 35: CPT | Mod: 25 | Performed by: PHYSICIAN ASSISTANT

## 2024-10-03 PROCEDURE — 95720 EEG PHY/QHP EA INCR W/VEEG: CPT | Performed by: PSYCHIATRY & NEUROLOGY

## 2024-10-03 PROCEDURE — 250N000011 HC RX IP 250 OP 636: Performed by: PHYSICIAN ASSISTANT

## 2024-10-03 PROCEDURE — 120N000002 HC R&B MED SURG/OB UMMC

## 2024-10-03 PROCEDURE — 95714 VEEG EA 12-26 HR UNMNTR: CPT

## 2024-10-03 RX ORDER — LEVETIRACETAM 750 MG/1
750 TABLET ORAL 2 TIMES DAILY
Status: DISCONTINUED | OUTPATIENT
Start: 2024-10-03 | End: 2024-10-04

## 2024-10-03 RX ADMIN — CARBAMAZEPINE 400 MG: 200 CAPSULE, EXTENDED RELEASE ORAL at 21:10

## 2024-10-03 RX ADMIN — LORAZEPAM 2 MG: 2 INJECTION INTRAMUSCULAR; INTRAVENOUS at 13:04

## 2024-10-03 RX ADMIN — LEVETIRACETAM 750 MG: 750 TABLET, FILM COATED ORAL at 16:00

## 2024-10-03 RX ADMIN — LORAZEPAM 0.5 MG: 0.5 TABLET ORAL at 21:10

## 2024-10-03 RX ADMIN — CARBAMAZEPINE 400 MG: 200 CAPSULE, EXTENDED RELEASE ORAL at 08:22

## 2024-10-03 RX ADMIN — ATENOLOL 50 MG: 25 TABLET ORAL at 08:21

## 2024-10-03 RX ADMIN — LEVETIRACETAM 750 MG: 750 TABLET, FILM COATED ORAL at 21:10

## 2024-10-03 RX ADMIN — Medication 1 TABLET: at 08:22

## 2024-10-03 RX ADMIN — ATENOLOL 25 MG: 25 TABLET ORAL at 21:10

## 2024-10-03 ASSESSMENT — ACTIVITIES OF DAILY LIVING (ADL)
ADLS_ACUITY_SCORE: 26

## 2024-10-03 NOTE — PROGRESS NOTES
Time/length of seizure event: 12:  Movements (head, eyes, extremities): eyes closed, unable to follow commands, head initially facing left, then turned to right side, full body movement.   Orientation during seizure: DIONTE  After seizure, remembers the unique phrase given during seizure: DIONTE  After seizure, remembers an unnamed visual object shown during seizure: DIONTE  Able to identify/name aloud item during seizure: DIONTE  Able to follow command during seizure: No  Able to read test sentence aloud during seizure: DIONTE  Able to read test sentence aloud again after the seizure: No  After seizure, remembers name of object shown during seizure: DIONTE  Orientation and level of consciousness after seizure: DIONTE  VS and oxygen saturation during/after seizure: 138/69, HR 92, O2 96.   Recall of the event?: DIONTE   Was this a typical seizure/event?: Yes  Presence of aura or pre-seizure activity: Yes, patient able to press seizure button   Incontinence: no    Team paged, ativan 2mg IV administered. Pt turned on side, VSS normalizing.

## 2024-10-03 NOTE — PLAN OF CARE
Status: VEEG monitoring. 1 event witnessed, see seizure note.   Vitals: VSS   Neuros: AOx4. Denies blurry vision.   IV: saline locked   Resp/trach: WNL   Diet: regular   Bowel status: BM 10/2.  : Voids without difficulty   Skin: EEG leads intact.   Pain: denies   Activity: SBA seizure precautions  Plan: Off keppra, continue with POC    Goal Outcome Evaluation:    Plan of Care Reviewed With: patient  Overall Patient Progress: improving  Outcome Evaluation: EEG continued       Pt will be able to teach back 2 points regarding diet education

## 2024-10-03 NOTE — PLAN OF CARE
"Status: VEEG monitoring. No events witnessed or reported.   Vitals: VSS. Cont pulse ox   Neuros: intact  IV: PIV SL  Resp/trach: WNL  Diet: regular  Bowel status: BM 10/2  : voiding spont  Skin: EEG leads intact  Pain: denies  Activity: SBA with seizure precautions   Plan: Off keppra, continue poc   Updates this shift: pt told RN at 0445 that she felt like she had an event around 0400, pt was unable to push button as she felt it \"happened too fast.\" Pt unable to describe event.      Goal Outcome Evaluation:      Plan of Care Reviewed With: patient    Overall Patient Progress: improvingOverall Patient Progress: improving    Outcome Evaluation: Off keppra, EEG continued      "

## 2024-10-03 NOTE — PROGRESS NOTES
"CLINICAL NUTRITION SERVICES    Reviewed nutrition risk factors due to LOS. Pt is tolerating diet, eating well per nursing documentation. No nutrition issues identified at this time. RD will follow per policy at this time, unless consulted.    Juanito Tavarez RD, LD, [facility]  Neuro ICU Dietitian; 6A Neuro  Vocera \"4E Clinical Dietitian\"  Weekend/holiday \"Weekend Clinical Dietitian\"      "

## 2024-10-03 NOTE — PROGRESS NOTES
[facility], [place]   Epilepsy Service Daily Note      Interval History:   Had seizure out of sleep 0404. She was able to hit event button and nurse performed ROAR. Had a focal to GTC around 1300. Got ativan 2 mg. Spoke to patient after and she denied pain, headache and tongue bite. Reported was a little tired.     Review of System:   No nausea, No vomiting, no headaches, no dizziness, no chest pain.     Medications:   Antiepileptic Medications Home Doses: levetiracetam 9515-4658, carbatrol 400-400  Antiepileptic Medications Current Doses: levetiracetam dc'd, carbatrol 400-400    Exam: Blood pressure (!) 151/65, pulse 62, temperature 98  F (36.7  C), temperature source Oral, resp. rate 16, SpO2 98%, not currently breastfeeding.  General: NAD  Head: NC/AT  Extremities: no LE edema  Neuro:  Alert and oriented. Speech fluent. Hearing intact to normal conversation. PERRL, EOM's intact. Face symmetric, tongue midline. Strong shoulder shrug bilaterally. Strength 5/5 bilaterally. No drift or pronation. Intact FNF. Sensation intact to light touch bilaterally.    EE electrographic seizures on 10/3  Labs: 24 carbamazepine 6.6, levetiracetam 29.8     Assessment and Plan: patient seen and discussed with Dr. Milton Montero.Patient is a 59 year old, right-handed female with a history of intractable focal epilepsy s/p VNS placement, depression, anxiety and HTN who is being admitted for vEEG monitoring for presurgical evaluation. One seizure recorded on EEG , another recorded  (but fair amount of artifact as patient in restroom and unfortunately off camera) and she had a third seizure this  in the AM but was off EEG for hygiene break. No further seizures since then despite further decreases in levetiracetam. Two electrographic seizures on 10/3 (1 focal and 1 focal to GTC).       -continue vEEG monitoring  -restart levetiracetam at 750 BID, give 2 doses today   -continue PTA carbatrol          Padmini Redmond PA-C    Type of target event identified: staring spell with altered awareness and convulsion   Number of events: more needed  Discharge medication plan: To be decided  Further Imaging studies needed prior to discharge: TBD-needs MRI  Discharge transportation: family to provide  Other pertinent issues/goals for discharge: psychiatry consult       Total time: 35 minute was spent in the care of this patient. The patient agrees with the above mentioned plan of care. I answered all the patient's questions and addressed immediate concerns. More than 50% of time spent consisted of counseling and coordinating care, including discussion of the diagnostic significance of EEG findings, anti-seizure medication management, and planning for discharge home

## 2024-10-04 ENCOUNTER — APPOINTMENT (OUTPATIENT)
Dept: NEUROLOGY | Facility: CLINIC | Age: 59
DRG: 101 | End: 2024-10-04
Attending: PHYSICIAN ASSISTANT
Payer: COMMERCIAL

## 2024-10-04 PROCEDURE — 99231 SBSQ HOSP IP/OBS SF/LOW 25: CPT | Mod: 25 | Performed by: PHYSICIAN ASSISTANT

## 2024-10-04 PROCEDURE — 95720 EEG PHY/QHP EA INCR W/VEEG: CPT | Performed by: PSYCHIATRY & NEUROLOGY

## 2024-10-04 PROCEDURE — 250N000013 HC RX MED GY IP 250 OP 250 PS 637: Performed by: PHYSICIAN ASSISTANT

## 2024-10-04 PROCEDURE — 95714 VEEG EA 12-26 HR UNMNTR: CPT

## 2024-10-04 PROCEDURE — 120N000002 HC R&B MED SURG/OB UMMC

## 2024-10-04 RX ORDER — LEVETIRACETAM 500 MG/1
500 TABLET ORAL 2 TIMES DAILY
Status: DISPENSED | OUTPATIENT
Start: 2024-10-04

## 2024-10-04 RX ADMIN — LEVETIRACETAM 750 MG: 750 TABLET, FILM COATED ORAL at 08:06

## 2024-10-04 RX ADMIN — CARBAMAZEPINE 400 MG: 200 CAPSULE, EXTENDED RELEASE ORAL at 19:58

## 2024-10-04 RX ADMIN — CARBAMAZEPINE 400 MG: 200 CAPSULE, EXTENDED RELEASE ORAL at 08:07

## 2024-10-04 RX ADMIN — ATENOLOL 25 MG: 25 TABLET ORAL at 19:58

## 2024-10-04 RX ADMIN — ATENOLOL 50 MG: 25 TABLET ORAL at 08:07

## 2024-10-04 RX ADMIN — LORAZEPAM 0.5 MG: 0.5 TABLET ORAL at 19:58

## 2024-10-04 RX ADMIN — LEVETIRACETAM 500 MG: 500 TABLET, FILM COATED ORAL at 19:58

## 2024-10-04 RX ADMIN — Medication 1 TABLET: at 08:06

## 2024-10-04 ASSESSMENT — ACTIVITIES OF DAILY LIVING (ADL)
ADLS_ACUITY_SCORE: 29
ADLS_ACUITY_SCORE: 30
ADLS_ACUITY_SCORE: 28
ADLS_ACUITY_SCORE: 30
ADLS_ACUITY_SCORE: 28
ADLS_ACUITY_SCORE: 30
ADLS_ACUITY_SCORE: 29
ADLS_ACUITY_SCORE: 30
ADLS_ACUITY_SCORE: 30
ADLS_ACUITY_SCORE: 29
ADLS_ACUITY_SCORE: 30
ADLS_ACUITY_SCORE: 29
ADLS_ACUITY_SCORE: 29
ADLS_ACUITY_SCORE: 30
ADLS_ACUITY_SCORE: 29
ADLS_ACUITY_SCORE: 30
ADLS_ACUITY_SCORE: 29
ADLS_ACUITY_SCORE: 30

## 2024-10-04 NOTE — PROGRESS NOTES
[facility], [place]   Epilepsy Service Daily Note      Interval History:   Had events this morning feeling tingling on right side of head. Did not lose awareness.     Review of System:   No nausea, No vomiting, no headaches, no dizziness, no chest pain.     Medications:   Antiepileptic Medications Home Doses: levetiracetam 0658-4595, carbatrol 400-400  Antiepileptic Medications Current Doses: levetiracetam 750-750, carbatrol 400-400    Exam: Blood pressure 127/74, pulse 73, temperature 98.1  F (36.7  C), temperature source Oral, resp. rate 16, SpO2 100%, not currently breastfeeding.  General: NAD  Head: NC/AT  Extremities: no LE edema  Neuro:  Alert and oriented. Speech fluent. Hearing intact to normal conversation. PERRL, EOM's intact. Face symmetric, tongue midline. Strong shoulder shrug bilaterally. Strength 5/5 bilaterally. No drift or pronation. Intact FNF. Sensation intact to light touch bilaterally.    EE electrographic seizures on 10/3  Labs: 24 carbamazepine 6.6, levetiracetam 29.8     Assessment and Plan: patient seen and discussed with Dr. Milton Montero.Patient is a 59 year old, right-handed female with a history of intractable focal epilepsy s/p VNS placement, depression, anxiety and HTN who is being admitted for vEEG monitoring for presurgical evaluation. One seizure recorded on EEG , another recorded  (but fair amount of artifact as patient in restroom and unfortunately off camera) and she had a third seizure this  in the AM but was off EEG for hygiene break. No further seizures since then despite further decreases in levetiracetam. Two electrographic seizures on 10/3 (1 focal and 1 focal to GTC). No further seizures       -continue vEEG monitoring  -decrease levetiracetam to 500-500  -hygiene break today   -continue PTA carbatrol         Padmini Redmond PA-C    Type of target event identified: staring spell with altered awareness and convulsion    Number of events: more needed  Discharge medication plan: To be decided  Further Imaging studies needed prior to discharge: TBD-needs MRI  Discharge transportation: family to provide  Other pertinent issues/goals for discharge: psychiatry consult       Total time: 25 minute was spent in the care of this patient. The patient agrees with the above mentioned plan of care. I answered all the patient's questions and addressed immediate concerns. More than 50% of time spent consisted of counseling and coordinating care, including discussion of the diagnostic significance of EEG findings, anti-seizure medication management, and planning for discharge home

## 2024-10-04 NOTE — PLAN OF CARE
Goal Outcome Evaluation:                           Goal Outcome Evaluation:       Plan of Care Reviewed With: patient     Overall Patient Progress: improvingOverall Patient Progress: improving     Status: vEEG monitoring. H/O VNS in place. One events reported this shift at 0955- team aware, no meds given.  Vitals: VSS on RA  Neuros: A&Ox4, blurry vision as at baseline  IV: PIV SL  Resp/trach: on RA  Diet: Regular diet- adequate intake  Bowel status: LBM 10/2  : Voiding   Skin: EEG leads in place, removed and replaced for hygiene break today  Pain: denied   Activity: SBA  Plan: Continue with POC

## 2024-10-04 NOTE — PLAN OF CARE
Goal Outcome Evaluation:      Plan of Care Reviewed With: patient    Overall Patient Progress: improvingOverall Patient Progress: improving    Status: Pt admitted for vEEG monitoring. No events witnessed/reported this shift   Vitals: VSS on RA  Neuros: A&Ox4, blurry vission   IV: PIV SL  Resp/trach: on RA  Diet: Regular diet  Bowel status: LBM 10/2  : Voiding spont  Skin: EEG leads in place  Pain: denied pain  Activity: SBA  Plan: Continue with POC  Updates this shift: NO events, Kepra restated,

## 2024-10-04 NOTE — PROGRESS NOTES
Care Management Follow Up    Length of Stay (days): 8    Expected Discharge Date: 10/06/2024     Concerns to be Addressed: discharge planning     Patient plan of care discussed at interdisciplinary rounds: Yes    Anticipated Discharge Disposition: Assisted Living  Anticipated Discharge Services: None  Anticipated Discharge DME: None    Patient/family educated on Medicare website which has current facility and service quality ratings: No  Education Provided on the Discharge Plan: Yes  Patient/Family in Agreement with the Plan: Yes    Referrals Placed by CM/SW:  NA  Private pay costs discussed: Not applicable    Discussed  Partnership in Safe Discharge Planning  document with patient/family: No     Handoff Completed: No, handoff not indicated or clinically appropriate at this time    Additional Information:  Patient status reviewed, discussed in discharge rounds. Per provider following rounds, patient is unlikely to discharge back to [facility] over the weekend. Encouraged provider to reach out to weekend RNCC if this changes and patient is ready for discharge.    Next Steps:   RNCC will continue to follow. Will assist with coordinating return to [facility] when patient is medically ready for discharge. Will need to update Nickolas Cohn Coordinator with [facility], ph: [phone] of discharge.     [facility]  [address], [address]  [facility]  Ph: [phone]   Fax: [phone]      Nickolas Cohn  [facility]  Ph: [phone]    Zita Patiño, RN, BSN  6A RN Care Coordinator  Ph: [phone]   Vocera: 6A Neuro RNCC    [place] [facility] [place] ([phone]) Saturday & Sunday; ([phone]) [facility] Recognized Holidays   Units: 5A Onc 5201 - 5219 RNCC,  5A Onc 5220 thru 5240 RNCC, 5C OFFSERVICE [phone] RNCC & 5C OFF SERVICE [phone] RNCC   Units: 6B Vocera, 6C Card 6401 thru 6420 RNCC, 6C Card 6502 thru 6514 RNCC & 6C Card 6515 thru 6519 RNCC    Units: 7A SOT  RNCC Vocera, 7B Med Surg Vocera, 7C Med Surg 7401 thru 7418 RNCC & 7C Med Surg 7502 thru 7520 RNCC   Units: 6A Vocera & 4A CVICU Vocera, 4C MICU Vocera, and 4E SICU Vocera     Units: 5 Ortho Vocera & 5 Med Surg Vocera    Units: 6 Med Surg Vocera & 8 Med Surg Vocera

## 2024-10-04 NOTE — PLAN OF CARE
Status: Pt admitted for vEEG monitoring. No events witnessed/reported this shift   Vitals: VSS on RA   Neuros: Intact- denied blurry vision  IV: PIV SL   Resp/trach: Denies SOB   Diet: Regular   Bowel status: LBM 10/2   : Voiding spontaneously   Skin: EEG leads intact   Pain: Denied   Activity: SBA- ambulated in hallway   Plan: Continue POC   Updates this shift: Keppra restarted

## 2024-10-05 ENCOUNTER — APPOINTMENT (OUTPATIENT)
Dept: NEUROLOGY | Facility: CLINIC | Age: 59
DRG: 101 | End: 2024-10-05
Attending: PHYSICIAN ASSISTANT
Payer: COMMERCIAL

## 2024-10-05 PROCEDURE — 95714 VEEG EA 12-26 HR UNMNTR: CPT

## 2024-10-05 PROCEDURE — 250N000013 HC RX MED GY IP 250 OP 250 PS 637: Performed by: PHYSICIAN ASSISTANT

## 2024-10-05 PROCEDURE — 95720 EEG PHY/QHP EA INCR W/VEEG: CPT | Performed by: PSYCHIATRY & NEUROLOGY

## 2024-10-05 PROCEDURE — 99232 SBSQ HOSP IP/OBS MODERATE 35: CPT | Mod: 25 | Performed by: PHYSICIAN ASSISTANT

## 2024-10-05 PROCEDURE — 120N000002 HC R&B MED SURG/OB UMMC

## 2024-10-05 PROCEDURE — 99232 SBSQ HOSP IP/OBS MODERATE 35: CPT | Mod: 25 | Performed by: PSYCHIATRY & NEUROLOGY

## 2024-10-05 PROCEDURE — 250N000013 HC RX MED GY IP 250 OP 250 PS 637: Performed by: PSYCHIATRY & NEUROLOGY

## 2024-10-05 RX ADMIN — LEVETIRACETAM 500 MG: 500 TABLET, FILM COATED ORAL at 08:29

## 2024-10-05 RX ADMIN — ATENOLOL 50 MG: 25 TABLET ORAL at 08:29

## 2024-10-05 RX ADMIN — Medication 1 TABLET: at 08:29

## 2024-10-05 RX ADMIN — LORAZEPAM 0.5 MG: 0.5 TABLET ORAL at 20:24

## 2024-10-05 RX ADMIN — CARBAMAZEPINE 300 MG: 200 TABLET ORAL at 20:24

## 2024-10-05 RX ADMIN — ATENOLOL 25 MG: 25 TABLET ORAL at 20:24

## 2024-10-05 RX ADMIN — CARBAMAZEPINE 400 MG: 200 CAPSULE, EXTENDED RELEASE ORAL at 08:31

## 2024-10-05 RX ADMIN — LEVETIRACETAM 500 MG: 500 TABLET, FILM COATED ORAL at 20:23

## 2024-10-05 ASSESSMENT — ACTIVITIES OF DAILY LIVING (ADL)
ADLS_ACUITY_SCORE: 28
ADLS_ACUITY_SCORE: 31
ADLS_ACUITY_SCORE: 28

## 2024-10-05 NOTE — PLAN OF CARE
Goal Outcome Evaluation:      Plan of Care Reviewed With: patient    Overall Patient Progress: improvingOverall Patient Progress: improving       Status: Pt admitted for vEEG monitoring. No events witnessed/reported this shift   Vitals: VSS on RA  Neuros: A&Ox4, blurry vission   IV: PIV SL  Resp/trach: on RA  Diet: Regular diet  Bowel status: LBM 10/2  : Voiding spont  Skin: EEG leads in place  Pain: denied pain  Activity: SBA  Plan: Continue with POC  Updates this shift: NO events

## 2024-10-05 NOTE — PLAN OF CARE
Status: Pt admitted for vEEG monitoring. No events witnessed/reported this shift   Vitals: VSS on RA  Neuros: A&Ox4, blurry vision as at baseline unchanged  IV: PIV SL  Resp/trach: on RA  Diet: Regular diet- good intake  Bowel status: LBM 10/2  : Voiding   Skin: EEG leads in place. Partial bath with min assist this afternoon. Had shower with hygiene break yesterday  Pain: denied pain  Activity: SBA, walked in hernandez with RN and sat in recliner most of the afternoon.  Plan: Continue with POC  Updates this shift: Med changed by team to start med decrease this evening

## 2024-10-05 NOTE — PROGRESS NOTES
[facility], [place]:   Neurology EPILEPSY Service Progress Note     Subjective:   The patient and nursing staff reported that she had no seizures overnight.    She denied having adverse effects of medications.  She has no new complaints.    Objective:   No dermatitis was observed.  Vital signs were as per the eMR.  Cranial nerve examination was normal.  Motor examination showed normal bulk and tone, with no weakness.    Sensation was intact to LT throughout.  Coordination was normal.    Assessment/Plan:   The patient is doing well in tolerating VEEG.      She has had no further seizures recorded since Thursday (10/03/2024).  She again confirmed her interest in pursuing seizure onset zone localization for epilepsy surgery evaluation.  I reviewed reasons that we record multiple seizures to assure adequate localization on scalp EEG.  She had no further questions about this.      We agreed to make a dose reduction in carbamazepine, the purpose of seizure induction, reducing form 400 mg twice daily to 300 mg twice daily.  She understands that a generalized tonic-clonic seizure could occur during monitoring, although her usual seizure pattern makes a focal seizure without generalization more likely.  We reviewed safety assurance by never leaving bed without nurse assistance, with which she strongly agrees.      I reviewed the Psychiatry consultation that was performed during this admission, as a standard presurgical evaluation component.  Continuation of psychotherapy for depression was recommended, but no medication therapy was recommended.      <><><><><><><><><><><><><><><><><><><><><><><><><><><><>         Summary of Diagnosis & Therapy Planning:      VEEG recording of habitual events for presurgical localization:  Additional seizure recoding is indicated.    Therapy of habitual events:  Pending.     Additional consultations-studies planned before or at discharge:  None.     Additional consultations-studies to occur  following discharge:  None.     Psychosocial interventions:  No issues with transportation or housing identified.    Other goals for this admission:  None identified.     <><><><><><><><><><><><><><><><><><><><><><><><><><><><>         I personally saw and examined the patient on October 5, 2024.  I spent 38 minutes in this patient care that was performed on this day, with 18 minutes in direct patient contact, and 20 minutes in chart review and document preparation on the day of the visit.   Amol Rose M.D., Professor of Neurology   Contact information for physicians covering Epilepsy and EEG is available on [facility] under Neurology Adult/[facility]/Staff Epilepsy and EEG

## 2024-10-06 ENCOUNTER — APPOINTMENT (OUTPATIENT)
Dept: NEUROLOGY | Facility: CLINIC | Age: 59
DRG: 101 | End: 2024-10-06
Attending: PHYSICIAN ASSISTANT
Payer: COMMERCIAL

## 2024-10-06 LAB — GLUCOSE BLDC GLUCOMTR-MCNC: 106 MG/DL (ref 70–99)

## 2024-10-06 PROCEDURE — 95720 EEG PHY/QHP EA INCR W/VEEG: CPT | Performed by: PSYCHIATRY & NEUROLOGY

## 2024-10-06 PROCEDURE — 120N000002 HC R&B MED SURG/OB UMMC

## 2024-10-06 PROCEDURE — 95714 VEEG EA 12-26 HR UNMNTR: CPT

## 2024-10-06 PROCEDURE — 250N000013 HC RX MED GY IP 250 OP 250 PS 637: Performed by: PSYCHIATRY & NEUROLOGY

## 2024-10-06 PROCEDURE — 99232 SBSQ HOSP IP/OBS MODERATE 35: CPT | Mod: 25 | Performed by: PSYCHIATRY & NEUROLOGY

## 2024-10-06 PROCEDURE — 250N000013 HC RX MED GY IP 250 OP 250 PS 637: Performed by: PHYSICIAN ASSISTANT

## 2024-10-06 RX ADMIN — LEVETIRACETAM 500 MG: 500 TABLET, FILM COATED ORAL at 19:44

## 2024-10-06 RX ADMIN — ATENOLOL 25 MG: 25 TABLET ORAL at 19:44

## 2024-10-06 RX ADMIN — LORAZEPAM 0.5 MG: 0.5 TABLET ORAL at 19:44

## 2024-10-06 RX ADMIN — ATENOLOL 50 MG: 25 TABLET ORAL at 08:41

## 2024-10-06 RX ADMIN — Medication 1 TABLET: at 08:41

## 2024-10-06 RX ADMIN — CARBAMAZEPINE 300 MG: 200 TABLET ORAL at 08:43

## 2024-10-06 RX ADMIN — CARBAMAZEPINE 300 MG: 200 TABLET ORAL at 19:46

## 2024-10-06 RX ADMIN — LEVETIRACETAM 500 MG: 500 TABLET, FILM COATED ORAL at 09:38

## 2024-10-06 ASSESSMENT — ACTIVITIES OF DAILY LIVING (ADL)
ADLS_ACUITY_SCORE: 26
ADLS_ACUITY_SCORE: 25
ADLS_ACUITY_SCORE: 26
ADLS_ACUITY_SCORE: 26
ADLS_ACUITY_SCORE: 25
ADLS_ACUITY_SCORE: 26
ADLS_ACUITY_SCORE: 25
ADLS_ACUITY_SCORE: 26

## 2024-10-06 NOTE — PLAN OF CARE
Status: Pt admitted 09/26 for vEEG monitoring  Vitals: VSS on RA.   Neuros: Intermittent blurry vision, otherwise intact  IV: PIV SL  Resp/trach: LSC  Diet: Regular- good intake  Bowel status: LBM 10/4  : Voiding spontaneously  Skin: vEEG leads in place. No new deficits  Pain: Denies  Activity: A1, GB. Walked in hernandez, sat in recliner  Plan: Continue to monitor and follow POC    1555- pt pushed event button. RN responded immediately and pt was found to be laying on her back in bed. She was staring straight ahead and did not reply when asked if she was ok. She did not reply when asked to point to ceiling or state her name. She then slid herself towards the bottom of the bed and onto her right side. Staff assisted her to remain in the bed while VS and BG were obtained. HTN in 180's and o2 87% noted. Sats increased to mid 90's quickly. BP re-checked and in 160's. Both eyes then deviated upwards and to the right and she was no longer attempting to get OOB. Total event was approx 10 minutes and then pt could list items and follow commands. Dr. Rose on unit shortly afterwards and aware. No meds given.

## 2024-10-06 NOTE — PLAN OF CARE
Goal Outcome Evaluation:      Plan of Care Reviewed With: patient    Overall Patient Progress: no changeOverall Patient Progress: no change    Outcome Evaluation: No events overnight    Status: Pt admitted 09/26 for vEEG monitoring  Vitals: VSS on RA. Cont. Pulse ox overnight  Neuros: Intermittent blurry vision, otherwise intact  IV: PIV SL  Resp/trach: LSC  Diet: Regular  Bowel status: LBM 10/4  : Voiding spontaneously  Skin: vEEG leads in place. No new deficits  Pain: Denies  Activity: A1, GB  Plan: Continue to monitor and follow POC

## 2024-10-06 NOTE — PROGRESS NOTES
[facility], [place]:   Neurology EPILEPSY Service Progress Note     Subjective:   The patient and nursing staff reported that she had one non-convulsive seizure today.    She denied having adverse effects of medications.  She has no new complaints.    Objective:   No dermatitis was observed.  Vital signs were as per the eMR.  Cranial nerve examination was normal.  Motor examination showed normal bulk and tone, with no weakness.    Sensation was intact to LT throughout.  Coordination was normal.    Assessment/Plan:   The patient continues to tolerate VEEG well.      She had a complex partial (focal impaired) seizure today, with ictal-postictal behavioral testing, which did not secondarily generalize.  Given that has had evidence of left frontotemporal and of right frontotemporal ictal onsets, we will continue VEEG monitoring for the purpose of presurgical localization.   She agrees with this.      Yesterday we made  a dose reduction in carbamazepine, for seizure induction.  In view of today's seizure recording, we will not further taper AEDs at this time.      Psychiatry consultation was performed during this admission, as a standard presurgical evaluation component.  Continuation of psychotherapy for depression was recommended, but no medication therapy was recommended.      <><><><><><><><><><><><><><><><><><><><><><><><><><><><>         Summary of Diagnosis & Therapy Planning:      VEEG recording of habitual events for presurgical localization:  Additional seizure recording is indicated.    Therapy of habitual events:  Pending.     Additional consultations-studies planned before or at discharge:  None.     Additional consultations-studies to occur following discharge:  None.     Psychosocial interventions:  No issues with transportation or housing identified.    Other goals for this admission:  None identified.     <><><><><><><><><><><><><><><><><><><><><><><><><><><><>         I personally saw and examined the  patient on October 6, 2024.  I spent 39 minutes in this patient care that was performed on this day, with 21 minutes in direct patient contact, and 18 minutes in chart review and document preparation on the day of the visit.   Amol Rose M.D., Professor of Neurology   Contact information for physicians covering Epilepsy and EEG is available on [facility] under Neurology Adult/[facility]/Staff Epilepsy and EEG

## 2024-10-06 NOTE — PLAN OF CARE
Pt is alert and oriented x4, hypertensive within parameter 152/71, scheduled atenolol given. afebrile on RA. Pt denies pain. Up with stand by assist with gait belt.  Bed alarm in place. Call light within reach. Bedroom door open and video monitoring in progress.

## 2024-10-07 ENCOUNTER — APPOINTMENT (OUTPATIENT)
Dept: NEUROLOGY | Facility: CLINIC | Age: 59
DRG: 101 | End: 2024-10-07
Attending: PHYSICIAN ASSISTANT
Payer: COMMERCIAL

## 2024-10-07 VITALS
DIASTOLIC BLOOD PRESSURE: 75 MMHG | OXYGEN SATURATION: 99 % | SYSTOLIC BLOOD PRESSURE: 130 MMHG | HEART RATE: 71 BPM | RESPIRATION RATE: 16 BRPM | TEMPERATURE: 99.1 F

## 2024-10-07 LAB — GLUCOSE BLDC GLUCOMTR-MCNC: 129 MG/DL (ref 70–99)

## 2024-10-07 PROCEDURE — 99232 SBSQ HOSP IP/OBS MODERATE 35: CPT | Mod: 25 | Performed by: PSYCHIATRY & NEUROLOGY

## 2024-10-07 PROCEDURE — 95714 VEEG EA 12-26 HR UNMNTR: CPT

## 2024-10-07 PROCEDURE — 95720 EEG PHY/QHP EA INCR W/VEEG: CPT | Performed by: PSYCHIATRY & NEUROLOGY

## 2024-10-07 PROCEDURE — 120N000002 HC R&B MED SURG/OB UMMC

## 2024-10-07 PROCEDURE — 250N000013 HC RX MED GY IP 250 OP 250 PS 637: Performed by: PSYCHIATRY & NEUROLOGY

## 2024-10-07 PROCEDURE — 250N000013 HC RX MED GY IP 250 OP 250 PS 637: Performed by: PHYSICIAN ASSISTANT

## 2024-10-07 RX ADMIN — ATENOLOL 50 MG: 25 TABLET ORAL at 07:54

## 2024-10-07 RX ADMIN — CARBAMAZEPINE 300 MG: 200 TABLET ORAL at 07:54

## 2024-10-07 RX ADMIN — LORAZEPAM 0.5 MG: 0.5 TABLET ORAL at 19:32

## 2024-10-07 RX ADMIN — CARBAMAZEPINE 300 MG: 200 TABLET ORAL at 19:32

## 2024-10-07 RX ADMIN — Medication 1 TABLET: at 07:54

## 2024-10-07 RX ADMIN — ATENOLOL 25 MG: 25 TABLET ORAL at 19:32

## 2024-10-07 RX ADMIN — LEVETIRACETAM 500 MG: 500 TABLET, FILM COATED ORAL at 19:32

## 2024-10-07 RX ADMIN — LEVETIRACETAM 500 MG: 500 TABLET, FILM COATED ORAL at 07:54

## 2024-10-07 ASSESSMENT — ACTIVITIES OF DAILY LIVING (ADL)
ADLS_ACUITY_SCORE: 25

## 2024-10-07 NOTE — PLAN OF CARE
Goal Outcome Evaluation:      Plan of Care Reviewed With: patient    Overall Patient Progress: no changeOverall Patient Progress: no change     Status: Pt admitted 09/26 for vEEG monitoring  Vitals: VSS on RA.   Neuros: Intermittent blurry vision, otherwise intact. No event witnessed or reported overnight.   IV: PIV SL  Resp/trach: LSC  Diet: Regular- good intake  Bowel status: LBM 10/4  : Voiding spontaneously  Skin: vEEG leads in place. No new deficits  Pain: Denies  Activity: A1, GB. Walked in hernandez, sat in recliner  Plan: Continue to monitor and follow POC

## 2024-10-07 NOTE — PLAN OF CARE
Status: Pt admitted for EEG monitoring for presurgical evaluation. Hx of intractable focal epilepsy s/p VNS placement, depression, anxiety and HTN.  Vitals: VSS on RA ex HTN within parameter.   Neuros: Intact ex intermittent blurry vision. Pt more confused after event this evening, but was eventually able to answer orientation questions.   IV: PIV SL.   Resp/trach: LS clear. Denies SOB.   Diet: Regular, good intake.   Bowel status: BS+. LBM today - loose.   : Voids spontaneously with intermittent incontinence. One episode of incontinence reported by pt from overnight.   Skin: EEG leads intact.   Pain: Denies.   Activity: Assist of one, BETH, within arm's reach. Side rails padded. Bed alarm on. Walked the halls with staff.   Social: No visitors this shift.   Plan: Capture events.   Updates this shift: One event this evening - team notified. Pt remained confused for about 30+ minutes after event.   Education completed: Seizure precautions reinforced.     Goal Outcome Evaluation:    Plan of Care Reviewed With: patient  Overall Patient Progress: no change  Outcome Evaluation: No events this shift. No med changes made.

## 2024-10-07 NOTE — PROGRESS NOTES
"[facility], [place]   Epilepsy Service Daily Note      Interval History:   One seizure yesterday afternoon around 1600. She recalls \"prickly\" feeling on top of head. Notes overnight had urinary incontinence in her brief. Feeling fine today. No body soreness, headache or tongue bite.     Review of System:   No nausea, No vomiting, no headaches, no dizziness, no chest pain.     Medications:   Antiepileptic Medications Home Doses: levetiracetam 7793-1659, carbatrol 400-400  Antiepileptic Medications Current Doses: levetiracetam 500-500 carbatrol 300-300    Exam: Blood pressure (!) 140/73, pulse 69, temperature 98.1  F (36.7  C), temperature source Oral, resp. rate 18, SpO2 97%, not currently breastfeeding.  General: NAD  Head: NC/AT  Extremities: no LE edema or tenderness   Neuro:  Alert and oriented. Speech fluent. Hearing intact to normal conversation. PERRL, EOM's intact. Face symmetric, tongue midline. Strong shoulder shrug bilaterally. Strength 5/5 bilaterally. No drift or pronation. Intact FNF. Sensation intact to light touch bilaterally.    EE electrographic seizures on 10/6  Labs: 24 carbamazepine 6.6, levetiracetam 29.8     Assessment and Plan: patient seen and discussed with Dr. Manisha Montero.Patient is a 59 year old, right-handed female with a history of intractable focal epilepsy s/p VNS placement, depression, anxiety and HTN who is being admitted for vEEG monitoring for presurgical evaluation. One seizure recorded on EEG , another recorded  (but fair amount of artifact as patient in restroom and unfortunately off camera) and she had a third seizure   in the AM but was off EEG for hygiene break. Two electrographic seizures on 10/3 (1 focal and 1 focal to GTC). One focal seizure on 10/6.       -continue vEEG monitoring  -continue lower doses of levetiracetam and carbamazepine         Padmini Redmond PA-C    Type of target event identified: staring spell " with altered awareness and convulsion   Number of events: more needed  Discharge medication plan: To be decided  Further Imaging studies needed prior to discharge: TBD-needs MRI  Discharge transportation: family to provide  Other pertinent issues/goals for discharge: psychiatry consult       Total time: 35 minute was spent in the care of this patient. The patient agrees with the above mentioned plan of care. I answered all the patient's questions and addressed immediate concerns. More than 50% of time spent consisted of counseling and coordinating care, including discussion of the diagnostic significance of EEG findings, anti-seizure medication management, and planning for discharge home

## 2024-10-07 NOTE — PROVIDER NOTIFICATION
6205 having an event at 1743 still in progress.  Epileptologist and EEG tech notified and Neurology resident. .    Vandana Alejandro, NEELAMN, RN, PHN, CNRN  Evening Charge Nurse Unit 6A  [phone]    Addendum BP at time of event was 180/134.

## 2024-10-07 NOTE — CARE PLAN
Time/length of seizure event: 1738, about 4 min  Movements (head, eyes, extremities): Whole body.   Orientation during seizure: N/A.   After seizure, remembers the unique phrase given during seizure: Yes.   After seizure, remembers an unnamed visual object shown during seizure: No  Able to identify/name aloud item during seizure: No  Able to follow command during seizure: No  Able to read test sentence aloud during seizure: Yes  Able to read test sentence aloud again after the seizure: Yes  After seizure, remembers name of object shown during seizure: No  Orientation and level of consciousness after seizure: Yes.   VS and oxygen saturation during/after seizure:  Recall of the event?: Intact except hypertensive.   Was this a typical seizure/event?: Yes  Presence of aura or pre-seizure activity:  Incontinence: No

## 2024-10-08 ENCOUNTER — APPOINTMENT (OUTPATIENT)
Dept: NEUROLOGY | Facility: CLINIC | Age: 59
DRG: 101 | End: 2024-10-08
Attending: PHYSICIAN ASSISTANT
Payer: COMMERCIAL

## 2024-10-08 LAB — GLUCOSE BLDC GLUCOMTR-MCNC: 156 MG/DL (ref 70–99)

## 2024-10-08 PROCEDURE — 95720 EEG PHY/QHP EA INCR W/VEEG: CPT | Performed by: PSYCHIATRY & NEUROLOGY

## 2024-10-08 PROCEDURE — 250N000013 HC RX MED GY IP 250 OP 250 PS 637: Performed by: PSYCHIATRY & NEUROLOGY

## 2024-10-08 PROCEDURE — 99232 SBSQ HOSP IP/OBS MODERATE 35: CPT | Mod: 25 | Performed by: PHYSICIAN ASSISTANT

## 2024-10-08 PROCEDURE — 250N000013 HC RX MED GY IP 250 OP 250 PS 637: Performed by: PHYSICIAN ASSISTANT

## 2024-10-08 PROCEDURE — 95714 VEEG EA 12-26 HR UNMNTR: CPT

## 2024-10-08 PROCEDURE — 120N000002 HC R&B MED SURG/OB UMMC

## 2024-10-08 RX ORDER — LEVETIRACETAM 750 MG/1
1500 TABLET ORAL ONCE
Status: COMPLETED | OUTPATIENT
Start: 2024-10-08 | End: 2024-10-08

## 2024-10-08 RX ADMIN — CARBAMAZEPINE 400 MG: 200 CAPSULE, EXTENDED RELEASE ORAL at 20:36

## 2024-10-08 RX ADMIN — LEVETIRACETAM 2000 MG: 750 TABLET, FILM COATED ORAL at 20:36

## 2024-10-08 RX ADMIN — ATENOLOL 50 MG: 25 TABLET ORAL at 07:32

## 2024-10-08 RX ADMIN — Medication 1 TABLET: at 07:32

## 2024-10-08 RX ADMIN — CARBAMAZEPINE 100 MG: 200 TABLET ORAL at 14:45

## 2024-10-08 RX ADMIN — CARBAMAZEPINE 300 MG: 200 TABLET ORAL at 07:32

## 2024-10-08 RX ADMIN — ATENOLOL 25 MG: 25 TABLET ORAL at 20:35

## 2024-10-08 RX ADMIN — LEVETIRACETAM 500 MG: 500 TABLET, FILM COATED ORAL at 07:32

## 2024-10-08 RX ADMIN — LEVETIRACETAM 1500 MG: 750 TABLET, FILM COATED ORAL at 13:27

## 2024-10-08 RX ADMIN — LORAZEPAM 0.5 MG: 0.5 TABLET ORAL at 20:35

## 2024-10-08 ASSESSMENT — ACTIVITIES OF DAILY LIVING (ADL)
ADLS_ACUITY_SCORE: 25

## 2024-10-08 NOTE — PROGRESS NOTES
Time/length of seizure event: button pressed at 1223 - event ~10 minutes from button press to pt responding appropriately  Movements (head, eyes, extremities): eyes closed but moving, pt laid down, not moving extremities, moaning  Orientation during seizure:Not answering orientation questions  After seizure, remembers the unique phrase given during seizure:No  After seizure, remembers an unnamed visual object shown during seizure:Yes  Able to identify/name aloud item during seizure:No  Able to follow command during seizure:No  Able to read test sentence aloud during seizure:No  Able to read test sentence aloud again after the seizure:Yes  After seizure, remembers name of object shown during seizure:Yes  Orientation and level of consciousness after seizure:drowsy, but oriented  VS and oxygen saturation during/after seizure:hypertensive, O2 stable  Recall of the event?: Remembers pressing button, but not event  Was this a typical seizure/event?:Yes  Presence of aura or pre-seizure activity:Yes  Incontinence:No    Team notified of event.

## 2024-10-08 NOTE — PROGRESS NOTES
[facility], [place]   Epilepsy Service Daily Note      Interval History:   One seizure yesterday around 1743. She recalls being out of bed at onset and feeling prickly sensation on top of head. Had a seizure today around 1230.    Review of System:   No nausea, No vomiting, no headaches, no dizziness, no chest pain.     Medications:   Antiepileptic Medications Home Doses: levetiracetam 7243-0262, carbatrol 400-400  Antiepileptic Medications Current Doses: levetiracetam 500-500 carbatrol 300-300    Exam: Blood pressure (!) 143/85, pulse 71, temperature 99.2  F (37.3  C), temperature source Oral, resp. rate 18, SpO2 98%, not currently breastfeeding.  General: NAD  Head: NC/AT  Extremities: no LE edema or tenderness   Neuro:  Alert and oriented. Speech fluent. Hearing intact to normal conversation. PERRL, EOM's intact. Face symmetric, tongue midline. Strong shoulder shrug bilaterally. Strength 5/5 bilaterally. No drift or pronation. Intact FNF. Sensation intact to light touch bilaterally.    EE electrographic seizures on 10/7  Labs: 24 carbamazepine 6.6, levetiracetam 29.8     Assessment and Plan: patient seen and discussed with Dr. Manisha Montero.Patient is a 59 year old, right-handed female with a history of intractable focal epilepsy s/p VNS placement, depression, anxiety and HTN who is being admitted for vEEG monitoring for presurgical evaluation. One seizure recorded on EEG , another recorded  (but fair amount of artifact as patient in restroom and unfortunately off camera) and she had a third seizure   in the AM but was off EEG for hygiene break. Two electrographic seizures on 10/3 (1 focal and 1 focal to GTC). One focal seizure on 10/6. Another focal seizure 10/7. Seizure today 10/8.      -continue vEEG monitoring  -restart levetiracetam 5151-0725  -resume PTA carbatrol 400-400        Padmini Redmond PA-C    Type of target event identified: staring spell with  altered awareness and convulsion   Number of events: more needed  Discharge medication plan: To be decided  Further Imaging studies needed prior to discharge: TBD-needs MRI  Discharge transportation: family to provide  Other pertinent issues/goals for discharge: psychiatry consult       Total time: 35 minute was spent in the care of this patient. The patient agrees with the above mentioned plan of care. I answered all the patient's questions and addressed immediate concerns. More than 50% of time spent consisted of counseling and coordinating care, including discussion of the diagnostic significance of EEG findings, anti-seizure medication management, and planning for discharge home

## 2024-10-08 NOTE — PLAN OF CARE
Goal Outcome Evaluation:      Plan of Care Reviewed With: patient    Overall Patient Progress: no changeOverall Patient Progress: no change     Status: Pt admitted 09/26 for vEEG monitoring  Vitals: VSS on RA.   Neuros: Intermittent blurry vision, otherwise intact. No event witnessed or reported overnight.   IV: PIV SL  Resp/trach: LSC  Diet: Regular- good intake  Bowel status: LBM 10/7  : Voiding spontaneously  Skin: vEEG leads in place. No new deficits  Pain: Denies  Activity: A1, GB.   Plan: Continue to monitor and follow POC

## 2024-10-08 NOTE — PLAN OF CARE
Status: Pt admitted for EEG monitoring for presurgical evaluation. Hx of intractable focal epilepsy s/p VNS placement, depression, anxiety and HTN.  Vitals: VSS on RA ex HTN within parameter.   Neuros: Intact ex intermittent blurry vision.   IV: PIV SL.   Resp/trach: LS clear. Denies SOB.   Diet: Regular, good intake.   Bowel status: BS+. LBM 10/7.  : Voids spontaneously with intermittent incontinence.   Skin: EEG leads intact.   Pain: Denies.   Activity: Assist of oneBETH, within arm's reach. Side rails padded. Bed alarm on. Walked the halls with staff.   Social: No visitors this shift.   Plan: Capture events.   Updates this shift: One event this afternoon - see previous note.  Education completed: Seizure precautions reinforced.     Goal Outcome Evaluation:    Plan of Care Reviewed With: patient  Overall Patient Progress: no change  Outcome Evaluation: One event this afternoon. See note.

## 2024-10-09 ENCOUNTER — APPOINTMENT (OUTPATIENT)
Dept: NEUROLOGY | Facility: CLINIC | Age: 59
DRG: 101 | End: 2024-10-09
Attending: PHYSICIAN ASSISTANT
Payer: COMMERCIAL

## 2024-10-09 VITALS
TEMPERATURE: 97.7 F | DIASTOLIC BLOOD PRESSURE: 74 MMHG | RESPIRATION RATE: 16 BRPM | SYSTOLIC BLOOD PRESSURE: 141 MMHG | HEART RATE: 66 BPM | OXYGEN SATURATION: 100 %

## 2024-10-09 PROCEDURE — 99238 HOSP IP/OBS DSCHRG MGMT 30/<: CPT | Mod: 25 | Performed by: PHYSICIAN ASSISTANT

## 2024-10-09 PROCEDURE — 250N000013 HC RX MED GY IP 250 OP 250 PS 637: Performed by: PHYSICIAN ASSISTANT

## 2024-10-09 PROCEDURE — 95711 VEEG 2-12 HR UNMONITORED: CPT

## 2024-10-09 PROCEDURE — 95718 EEG PHYS/QHP 2-12 HR W/VEEG: CPT | Performed by: PSYCHIATRY & NEUROLOGY

## 2024-10-09 RX ORDER — CARBAMAZEPINE 200 MG/1
CAPSULE, EXTENDED RELEASE ORAL
Qty: 360 CAPSULE | Refills: 1 | Status: SHIPPED | OUTPATIENT
Start: 2024-10-09

## 2024-10-09 RX ORDER — LEVETIRACETAM 1000 MG/1
2000 TABLET ORAL 2 TIMES DAILY
Qty: 360 TABLET | Refills: 1 | Status: SHIPPED | OUTPATIENT
Start: 2024-10-09

## 2024-10-09 RX ADMIN — LEVETIRACETAM 2000 MG: 750 TABLET, FILM COATED ORAL at 08:09

## 2024-10-09 RX ADMIN — CARBAMAZEPINE 400 MG: 200 CAPSULE, EXTENDED RELEASE ORAL at 08:10

## 2024-10-09 RX ADMIN — ATENOLOL 50 MG: 25 TABLET ORAL at 08:10

## 2024-10-09 RX ADMIN — Medication 1 TABLET: at 08:10

## 2024-10-09 ASSESSMENT — ACTIVITIES OF DAILY LIVING (ADL)
ADLS_ACUITY_SCORE: 25

## 2024-10-09 ASSESSMENT — VISUAL ACUITY: OU: GLASSES

## 2024-10-09 NOTE — PLAN OF CARE
Discharge time/date: 10/9/24 1350  Walked or Wheelchair: Walked - nephew providing ride home  PIV removed: Yes  Reviewed AVS with patient: Yes, and nephew  Medication due times added to AVS in EPIC: Yes  Verbalized understanding of discharge with teachback: Yes  Medications retrieved from pharmacy: No - meds will be mailed to pt   Supplies sent home: NA  Belongings from security with patient: Yes - home meds sent with pt, as well as all belongings      Goal Outcome Evaluation:    Plan of Care Reviewed With: patient, family  Overall Patient Progress: improving  Outcome Evaluation: Pt restarted on all AEDs. Discharged back to assisted living.

## 2024-10-09 NOTE — DISCHARGE SUMMARY
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 Early on she believes seizures were staring seizures and she was treated with phenobarbital, phenytoin and around puberty convulsive seizures started occurring. Despite numerous medication trials and VNS implantation in  she has never had a significant period of seizure freedom that she can recall.      Seizure types:  Type 1: May have warning, not always. Stares, unresponsive and has been noted to have hand movements. Lasts about 2 minutes. She is amnestic for what transpires. After confused and may take 20 minutes to get back to baseline. Frequency unclear but patient feels like she notes periods of missed time nearly daily.      Type 2: May have a warning (description difficult to describe) as she as told her niece she is about to have a seizure before. Has stiffening, " shaking, drooling. Eyes open. Lasts 4-5 minutes. Has bitten tongue and had urinary incontinence. After in confused, tired and sore. Sometimes she will wake up with a bitten tongue. Currently occurring once a months.     Hospital course:  ***  One seizure recorded on EEG 9/27, another recorded 9/29 (but fair amount of artifact as patient in restroom and unfortunately off camera). Two electrographic seizures on 10/3 (1 focal and 1 focal to GTC). One focal seizure on 10/6. Another focal seizure 10/7. Focal seizure 10/8.     Prior to discharge her levetiracetam was restarted and increased to 5829-4715 and her PTA carbatrol was increased back to 400-400 (was only decreased to 300-300).    Attending Physician (Dr. Dyer) Summary and Plan:  ***    Consultations:    Psychiatry, Dr. Marie:  ***    Discharge Medications:    Current Discharge Medication List        CONTINUE these medications which have CHANGED    Details   CARBATROL 200 MG 12 hr capsule TAKE TWO CAPSULES BY MOUTH EVERY MORNING AND TAKE TWO CAPSULES BY MOUTH EVERY EVENING  Qty: 360 capsule, Refills: 1    Comments: Brand  Associated Diagnoses: Partial symptomatic epilepsy with complex partial seizures, not intractable, without status epilepticus (H)      levETIRAcetam (KEPPRA) 1000 MG tablet Take 2 tablets (2,000 mg) by mouth 2 times daily.  Qty: 360 tablet, Refills: 1    Associated Diagnoses: Intractable focal epilepsy (H)           CONTINUE these medications which have NOT CHANGED    Details   !! atenolol (TENORMIN) 25 MG tablet Take 25 mg by mouth every evening      !! atenolol (TENORMIN) 50 MG tablet Take 50 mg by mouth every morning      LORazepam (ATIVAN) 0.5 MG tablet TAKE ONE TABLET BY MOUTH EVERY EVENING  Qty: 90 tablet, Refills: 1    Associated Diagnoses: Localization-related focal epilepsy with simple partial seizures (H)      medical cannabis (Patient's own supply) See Admin Instructions (The purpose of this order is to document that the patient  reports taking medical cannabis.  This is not a prescription, and is not used to certify that the patient has a qualifying medical condition.)      Multiple Vitamins-Minerals (CENTRUM SILVER) per tablet Take 1 tablet by mouth daily       !! - Potential duplicate medications found. Please discuss with provider.          Discharge physical examination:   Vitals:  B/P: 141/74, T: 97.7, P: 66, R: 16  General:  Adult, in NAD, cooperative  HEENT:  NC/AT  Cardiac:  RRR, no m/r/g  Chest:  CTAB  Abdomen:  S/NT/ND  Extremities:  No LE swelling.    Skin:  No rash or lesion.    Psych:  Mood pleasant, affect congruent  Neuro: Alert and oriented. Speech fluent. Hearing intact to normal conversation. PERRL, EOM's intact. Face symmetric, tongue midline. Strong shoulder shrug bilaterally. Strength 5/5 bilaterally. No drift or pronation. Intact FNF. Sensation intact to light touch bilaterally. Gait normal, stable.       Discharge follow up and instructions:    1.  Diet:   as prior to admission   2.  Activity: State laws prohibit operating a motor vehicle following any seizure or other episode with loss of awareness, or inability to sit up (Varies by state, be aware and comply with the regulations applicable to you). State law may require the licensed  to report any future episode to the DMV . Avoid any activities that might lead to self-injury or injury of others following any event with impaired awareness or impaired motor control. Such activities include but are not limited to holding babies or young children at heights from which they might be injured if dropped, bathing infants or young children in situations in which they might drown without continuous interactive care by an adult who is fully capable at all times during the bath, operating power cutting or other tools, handling firearms, exposure to heights from which you might fall, exposure to vessels with hot cooking oil or water, and swimming alone.  3.  Follow up:   [facility] nurse call in 2 days. Discharge video visit in 7-14 days and follow-up with Dr. Gillespie as scheduled in 11/2024. Brain MRI and neuropsychological testing ordered at discharge.     NOE Grimaldo    Total time: *** minutes was spent in the care of this patient. The patient agrees with the above mentioned plan of care. I answered all the patient's questions and addressed immediate concerns. More than 50% of time spent consisted of counseling and coordinating care, including discussion of the diagnostic significance of EEG findings, anti-seizure medication management, and planning for discharge home     EVENING, Disp-360 capsule, R-1, PEYTON, E-PrescribeBrand      levETIRAcetam (KEPPRA) 1000 MG tablet Take 2 tablets (2,000 mg) by mouth 2 times daily., Disp-360 tablet, R-1, E-Prescribe           CONTINUE these medications which have NOT CHANGED    Details   !! atenolol (TENORMIN) 25 MG tablet Take 25 mg by mouth every evening, Historical      !! atenolol (TENORMIN) 50 MG tablet Take 50 mg by mouth every morning, Historical      LORazepam (ATIVAN) 0.5 MG tablet TAKE ONE TABLET BY MOUTH EVERY EVENING, Disp-90 tablet, R-1, E-Prescribe      medical cannabis (Patient's own supply) See Admin Instructions (The purpose of this order is to document that the patient reports taking medical cannabis.  This is not a prescription, and is not used to certify that the patient has a qualifying medical condition.), Historical      Multiple Vitamins-Minerals (CENTRUM SILVER) per tablet Take 1 tablet by mouth daily, Historical       !! - Potential duplicate medications found. Please discuss with provider.          Discharge physical examination:   Vitals:  B/P: 141/74, T: 97.7, P: 66, R: 16  General:  Adult, in NAD, cooperative  HEENT:  NC/AT  Cardiac:  RRR, no m/r/g  Chest:  CTAB  Abdomen:  S/NT/ND  Extremities:  No LE swelling.    Skin:  No rash or lesion.    Psych:  Mood pleasant, affect congruent  Neuro: Alert and oriented. Speech fluent. Hearing intact to normal conversation. PERRL, EOM's intact. Face symmetric, tongue midline. Strong shoulder shrug bilaterally. Strength 5/5 bilaterally. No drift or pronation. Intact FNF. Sensation intact to light touch bilaterally. Gait normal, stable.       Discharge follow up and instructions:    1.  Diet:   as prior to admission   2.  Activity: State laws prohibit operating a motor vehicle following any seizure or other episode with loss of awareness, or inability to sit up (Varies by state, be aware and comply with the regulations applicable to you). State law may require the licensed  to  report any future episode to the DMV . Avoid any activities that might lead to self-injury or injury of others following any event with impaired awareness or impaired motor control. Such activities include but are not limited to holding babies or young children at heights from which they might be injured if dropped, bathing infants or young children in situations in which they might drown without continuous interactive care by an adult who is fully capable at all times during the bath, operating power cutting or other tools, handling firearms, exposure to heights from which you might fall, exposure to vessels with hot cooking oil or water, and swimming alone.  3.  Follow up:  [facility] nurse call in 2 days. Discharge video visit in 7-14 days and follow-up with Dr. Gillespie as scheduled in 11/2024. Brain MRI and neuropsychological testing ordered at discharge.     NOE Grimaldo    Total time: 25 minutes was spent in the care of this patient. The patient agrees with the above mentioned plan of care. I answered all the patient's questions and addressed immediate concerns. More than 50% of time spent consisted of counseling and coordinating care, including discussion of the diagnostic significance of EEG findings, anti-seizure medication management, and planning for discharge home

## 2024-10-09 NOTE — PLAN OF CARE
Status: Pt admitted for EEG monitoring for presurgical evaluation. Hx of intractable focal epilepsy s/p VNS placement, depression, anxiety and HTN.   Vitals: VSS except HTN, resolved with scheduled BP meds. Cont pulse ox noc  Neuros: intact  IV: PIV SL  Resp/trach: LSC on RA  Diet: regular  Bowel status: BM 10/7. BS+  : voiding spont  Skin: EEG leads intact. EEG leads on chest causing blister like patch, leads location changed.   Pain: denies  Activity: SBA/GB  Plan: capture events  Updates this shift: no events this shift      Goal Outcome Evaluation:      Plan of Care Reviewed With: patient    Overall Patient Progress: no changeOverall Patient Progress: no change    Outcome Evaluation: Started back on AED medications.

## 2024-10-09 NOTE — PROGRESS NOTES
Care Management Discharge Note    Discharge Date: 10/09/2024       Discharge Disposition: Assisted Living    Discharge Services: None    Discharge DME: None    Discharge Transportation: family or friend will provide    Private pay costs discussed: Not applicable    Does the patient's insurance plan have a 3 day qualifying hospital stay waiver?  No    PAS Confirmation Code:    Patient/family educated on Medicare website which has current facility and service quality ratings: no    Education Provided on the Discharge Plan: Yes  Persons Notified of Discharge Plans: Pt, senior living and Nephew  Patient/Family in Agreement with the Plan: yes    Handoff Referral Completed: Yes, non-[facility] PCP: External handoff communication completed    Additional Information:  RNCC notified Pt is medically ready to return to her senior living. Her nephew will transport the Pt home.    RNCC  updated senior living of return and faxed DC summary.    RNCC left a VM for Suki case coordinator, notifying her of the discharge.    No other discharge needs identified at this time.     - - - -  [facility]  [address]  [place]  Ph: [phone]   Fax: [phone]       Suki Case Coordinator  [facility]  Ph: [phone]    Kiki Engle RN  RNCC Float   [phone]

## 2024-10-11 ENCOUNTER — VIRTUAL VISIT (OUTPATIENT)
Dept: NEUROLOGY | Facility: CLINIC | Age: 59
End: 2024-10-11
Payer: COMMERCIAL

## 2024-10-11 ENCOUNTER — TELEPHONE (OUTPATIENT)
Dept: NEUROLOGY | Facility: CLINIC | Age: 59
End: 2024-10-11

## 2024-10-11 DIAGNOSIS — G40.219 PARTIAL SYMPTOMATIC EPILEPSY WITH COMPLEX PARTIAL SEIZURES, INTRACTABLE, WITHOUT STATUS EPILEPTICUS (H): Primary | ICD-10-CM

## 2024-10-11 NOTE — PROGRESS NOTES
"Call to patient following inpatient VEEG monitoring.  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  Hospital course:  One seizure recorded on EEG , another recorded  (but fair amount of artifact as patient in restroom and unfortunately off camera). Two electrographic seizures on 10/3 (1 focal and 1 focal to GTC). One focal seizure on 10/6. Another focal seizure 10/7. Focal seizure 10/8.      Prior to discharge her levetiracetam was restarted and increased to 5586-5891 and her PTA carbatrol was increased back to 400-400 (was only decreased to 300-300).\"      Patient was accompanied by a niece and a friend on the martita.    Patient has had one seizure since discharge, and she notes that it was soon after she arrived home, and she was unable to find her VNS magnet in time to swipe it. It was a focal seizure.    We discussed that a number of seizures were recorded during the stay, including one that generalized. Mili noted that she had a bit of leg pain after that seizure, but she is much improved now, and feels she should be fully recovered in the next day or two.    Medications discussed. LEV " dose increase has been initiated, however Mili received a call from her pharmacy yesterday that her Carbatrol is not covered.  Chart review shows the most recent PA  at the end of July, so will start a PA encounter.    Follow up reviewed  Future Appointments   Date Time Provider Department Center   10/23/2024 10:00 AM Padmini Redmond PA [facility]   2024  2:00 PM Dario Gillespie MD [facility]       No other questions at this time  Patient/caregivers were instructed to call if questions or concerns arise

## 2024-10-11 NOTE — PROGRESS NOTES
Mili is a 59 year old who is being evaluated via a billable telephone visit.    What phone number would you like to be contacted at? [phone]  How would you like to obtain your AVS? Mail a copy  Originating Location (pt. Location): Home  {PROVIDER LOCATION On-site should be selected for visits conducted from your clinic location or adjoining [facility] hospital, academic office, or other nearby [facility] building. Off-site should be selected for all other provider locations, including home:835884}  Distant Location (provider location):  On-site    {PROVIDER CHARTING PREFERENCE:516395}    Subjective   Mili is a 59 year old, presenting for the following health issues:  Follow Up (Hospital Follow Up /September 26th and October 9th )    HPI     ***    {ROS Picklists (Optional):054794}      Objective           Vitals:  No vitals were obtained today due to virtual visit.    Physical Exam   General: Alert and no distress //Respiratory: No audible wheeze, cough, or shortness of breath // Psychiatric:  Appropriate affect, tone, and pace of words      {Diagnostic Test Results (Optional):760637}      Phone call duration: *** minutes  Signed Electronically by: [facility] Nurse 1  {Email feedback regarding this note to [email or web address]   :972701}

## 2024-10-11 NOTE — TELEPHONE ENCOUNTER
Prior Authorization Retail Medication Request    Medication/Dose: Carbatrol 200 mg caps (Brand)  Diagnosis and ICD code (if different than what is on RX):    New/renewal/insurance change PA/secondary ins. PA:  Previously Tried and Failed:    Rationale:      Insurance   Primary:   Insurance ID:      Secondary (if applicable):  Insurance ID:      Pharmacy Information (if different than what is on RX)  Name:    Phone:    Fax:    Clinic Information  Preferred routing pool for dept communication: [facility] LIRA

## 2024-10-14 NOTE — PROGRESS NOTES
Ms. Mili Mane was admitted to epilepsy monitoring unit and underwent video EEG monitoring for 14 days (9/26-10/9/2024) for a presurgical evaluation.      Patient was admitted on levetiracetam 3500 mg/day and carbatrol 800 mg/d. Her interictal EEG was abnormal, revealing independent bitemporal delta slowing was present.  During some days of the monitoring, rare independent epileptiform discharges were seen over the right temporal and left frontotemporal region.  Her levetiracetam was reduced to 2500 mg/d on day 1 and 1500 mg/d on day 2.  The patient had a focal to bilateral tonic-clonic seizure on 9/27.  It was concluded that the patient is sensitive to minor changes in levetiracetam dose, therefore, she was loaded with LVT and the dose was increased and she was monitored on 2500 mg/d for couple days.  Then it was reduced to 1500 mg/d on 9/29. Unfortunately she had 2 seizures on 9/29, one during hygiene break and one when she was in the bathroom and EEG was uninterpretable due to significant artifact.   Levetiracetam was then tapered to off, but resumed again at 1000 mg/d and then carbatrol was reduced to 600 mg/d which resulted in recording of 4 more electroclinical seizures.      Below is the summary of patient's electroclinical seizures.   During day 2 of the monitoring, a focal to bilateral tonic-clonic seizure was recorded with first ictal discharges seen over bitemporal regions 30 seconds after patient's aura, with best evolution over the right temporal region.  Following termination of tonic-clonic seizure, a left hemispheric ictal discharge continued without interruption, and spread to right hemisphere.    On day 7 of the monitoring, the patient had a focal seizure out of sleep with a left temporal (at T3/T5 maxima) onset. On day 8 she had a focal to bilateral tonic-clonic seizure of left temporal onset.(ictal EEG preceded by an aura 7 sec earlier).  There was a brief pause in the ictal discharge  "following termination of tonic-clonic seizure and then there was a right frontotemporal ictal discharge.  On day 11 of the monitoring, the patient had a focal impaired-aware seizure with the first ictal discharge seen over the left frontotemporal region, 45 seconds after patient's aura, followed by a pause and then right temporal ictal activities. On day 12 the patient had a focal impaired-aware seizure with initial ictal onset over the left temporal region, 70 seconds after the patient's aura.      Overall, ictal pattern was complex.  All seizures except seizure #1, had a late-onset left temporal ictal discharge, which was preceded by an aura several seconds earlier, (typically 30-70 sec).  First seizure had a late bitemporal onset--30 seconds after aura, with best evolution in the right temporal lobe.  There was a distinct ictal discharge evolved after termination of seizures on day 2, 8 and 11 on the opposite side.   Although ictal EEG pattern was best seen in temporal lobes, clinical symptoms were not of typical temporal lobe seizures.  She had an aura of \"prickling/tingling\" over the top of her scalp consistently reported during the last 3 seizures.  Manual/oral automatisms, were not prominent symptoms and if any, they occurred later in the seizure, after seizure spread. She did not have a well lateralizing/localizing symptom at seizure onset.    These findings may indicate multifocal epilepsy or more likely an extratemporal seizure-focus.      Patient was restarted on PTA Carbatrol (800 mg/d). Levetiracetam was increased to 2000 mg bid.  No medication was added as the patient is a surgical candidate.      Patient is going to have a brain MRI to look for potential intracranial lesions, as well as a FDG PET. She will also need a neuropsychological evaluation.  She most likely needs extensive stereo EEG as it's not a straightforward temporal lobe epilepsy. Will hold off on Wada for now as resection is less likely " unless MRI is lesional. Neurostimulation seems to be more likely treatment option for this patient.      Findings and next steps were discussed the patient. Patient is going to have a follow up visit with Dr. Gillespie in 4-6 weeks.      Azeb Andrea MD

## 2024-10-15 NOTE — TELEPHONE ENCOUNTER
Retail Pharmacy Prior Authorization Team   Phone: [phone]  PA Initiation    Medication: CARBATROL 200 MG PO CP12  Insurance Company: [employer] - Phone [phone] Fax [phone]  Pharmacy Filling the Rx: [facility] - [place], [place] - [address]  Filling Pharmacy Phone: [phone]  Filling Pharmacy Fax:    Start Date: 10/15/2024

## 2024-10-16 ENCOUNTER — MYC MEDICAL ADVICE (OUTPATIENT)
Dept: NEUROLOGY | Facility: CLINIC | Age: 59
End: 2024-10-16

## 2024-10-16 NOTE — TELEPHONE ENCOUNTER
Retail Pharmacy Prior Authorization Team   Phone: [phone]    Additional question set requested and faxed back:

## 2024-10-18 NOTE — TELEPHONE ENCOUNTER
Retail Pharmacy Prior Authorization Team   Phone: [phone]    Prior Authorization Approval    Medication: CARBATROL 200 MG PO CP12  Authorization Effective Date: 10/17/2024  Authorization Expiration Date: 10/16/2025  Reference #:[id number]     Insurance Company: [employer] - Phone [phone] Fax [phone]  Which Pharmacy is filling the prescription: [facility] - [place] [address]   Pharmacy Notified: YES  Patient Notified: YES **Instructed pharmacy to notify patient when script is ready to /ship.**

## 2024-10-23 ENCOUNTER — VIRTUAL VISIT (OUTPATIENT)
Dept: NEUROLOGY | Facility: CLINIC | Age: 59
End: 2024-10-23
Payer: COMMERCIAL

## 2024-10-23 VITALS — WEIGHT: 147 LBS | HEIGHT: 62 IN | BODY MASS INDEX: 27.05 KG/M2

## 2024-10-23 DIAGNOSIS — G40.219 PARTIAL SYMPTOMATIC EPILEPSY WITH COMPLEX PARTIAL SEIZURES, INTRACTABLE, WITHOUT STATUS EPILEPTICUS (H): Primary | ICD-10-CM

## 2024-10-23 PROCEDURE — 99496 TRANSJ CARE MGMT HIGH F2F 7D: CPT | Mod: 95 | Performed by: PHYSICIAN ASSISTANT

## 2024-10-23 ASSESSMENT — PAIN SCALES - GENERAL: PAINLEVEL_OUTOF10: NO PAIN (0)

## 2024-10-23 NOTE — PROGRESS NOTES
Virtual Visit Details    Type of service:  Video Visit   Video Start Time:  10:01am  Video End Time: 10:20am    Originating Location (pt. Location): Home    Distant Location (provider location):  On-site  Platform used for Video Visit: Jb

## 2024-10-23 NOTE — PROGRESS NOTES
[facility] Epilepsy Care Progress Note      Patient:  Mili Mane  :  1965   Age:  59 year old   Today's Video Visit:  10/23/2024    Epilepsy Data:  Right-handed female with a history of intractable focal epilepsy s/p VNS placement, depression, anxiety and HTN. She reports seizure onset at age 18 months      Seizure types:  Type 1: May have warning, not always. Stares, unresponsive and has been noted to have hand movements. Lasts about 2 minutes. She is amnestic for what transpires. After confused and may take 20 minutes to get back to baseline. Frequency unclear but patient feels like she notes periods of missed time nearly daily.      Type 2: May have a warning (description difficult to describe) as she as told her niece she is about to have a seizure before. Has stiffening, shaking, drooling. Eyes open. Lasts 4-5 minutes. Has bitten tongue and had urinary incontinence. After in confused, tired and sore. Sometimes she will wake up with a bitten tongue     VEEG 2024: Summary of 14 days of video-EEG monitoring: This video EEG was abnormal.  Independent bitemporal delta slowing was present.  During some days of the monitoring, rare independent epileptiform discharges were seen over the right temporal and left frontotemporal region.  During day 2 of the monitoring, a focal to bilateral tonic-clonic seizure was recorded with first ictal discharges seen over bitemporal regions 30 seconds after patient's aura, with best evolution over the right temporal region.  Following termination of tonic-clonic seizure, a left hemispheric ictal discharge continued without interruption, and spread to right hemisphere.     On day 7 of the monitoring, the patient had a focal seizure out of sleep with a left temporal (at T3/T5 maxima) onset.  On day 8 she had a focal to bilateral tonic-clonic seizure of left temporal onset.  There was a brief pause in the ictal discharge following termination of  "tonic-clonic seizure and then there was a right frontotemporal ictal discharge.  On day 11 of the monitoring, the patient had a focal impaired-aware seizure with the first ictal discharge seen over the left frontotemporal region, 45 seconds after patient's aura, followed by a pause and then right temporal ictal activities. On day 12 the patient had a focal impaired-aware seizure with initial ictal onset over the left temporal region, 70 seconds after the patient's aura. On day 13, the patient had an aura with amnesia for memory phrase with no EEG changes.      Overall, ictal pattern was complex.  All seizures except first one, had a late-onset left temporal ictal discharge, which was preceded by an aura typically 30-70 seconds earlier.  First seizure had a late  bitemporal discharge--30 seconds after aura, with best evolution in the right temporal lobe.  There was a distinct ictal discharge evolved after termination of seizures on day 2, 8 and 11 on the opposite side.   Although ictal EEG pattern was best seen in temporal lobes, clinical symptoms were not of typical temporal lobe seizures.  She had an aura of \"prickling/tingling\" over the top of her scalp consistently reported during the last 3 seizures.  Manual/oral automatisms, were not prominent symptoms and if any, they occurred later in the seizure, after seizure spread. She did not have a well lateralizing/localizing symptom at seizure onset.    These findings may indicate an extratemporal seizure-focus.    History of Present Illness:   She reports 3-4 focal seizures since discharge. No generalized tonic-clonic seizures that she is aware of. Shewas discharged on PTA carbatrol 400-400 and increased levetiracetam [id number]. She does report increased tiredness. She feels a lot of seizures occur in early evening.     Current Outpatient Medications   Medication Sig Dispense Refill    atenolol (TENORMIN) 25 MG tablet Take 25 mg by mouth every evening      atenolol " "(TENORMIN) 50 MG tablet Take 50 mg by mouth every morning      CARBATROL 200 MG 12 hr capsule TAKE TWO CAPSULES BY MOUTH EVERY MORNING AND TAKE TWO CAPSULES BY MOUTH EVERY EVENING 360 capsule 1    levETIRAcetam (KEPPRA) 1000 MG tablet Take 2 tablets (2,000 mg) by mouth 2 times daily. 360 tablet 1    LORazepam (ATIVAN) 0.5 MG tablet TAKE ONE TABLET BY MOUTH EVERY EVENING (Patient taking differently: Take 0.5 mg by mouth every evening. TAKE ONE TABLET BY MOUTH EVERY EVENING) 90 tablet 1    medical cannabis (Patient's own supply) See Admin Instructions (The purpose of this order is to document that the patient reports taking medical cannabis.  This is not a prescription, and is not used to certify that the patient has a qualifying medical condition.)      Multiple Vitamins-Minerals (CENTRUM SILVER) per tablet Take 1 tablet by mouth daily          Perceived AED Side Effects:  Yes-tiredness    Medication Notes:  Carbatrol 400-400, levetiracetam [id number]      AED Medication Compliance:  compliant most of the time  Using a pill box:  Yes    Review of Systems:  Lethargy / Tiredness:  Yes  Slowed Cognitive Function:  No  Memory Problems:  Yes  Poor Balance:  No  Dizziness:  No    Other Issues:  Mood is doing ok  Is patient safe to drive:  No    Exam:  Alert and oriented. Speech fluent. Hearing intact to normal conversation. Face symmetric. Does not appear overtly depressed or anxious. Does appear tired.   Ht 1.575 m (5' 2.01\")   Wt 66.7 kg (147 lb)   LMP  (LMP Unknown)   BMI 26.88 kg/m       Wt Readings from Last 5 Encounters:   10/23/24 66.7 kg (147 lb)   08/06/24 66.7 kg (147 lb)   02/20/24 62.9 kg (138 lb 9.6 oz)   01/12/24 61.3 kg (135 lb 3.2 oz)   11/21/23 60 kg (132 lb 3.2 oz)       Assessment and Plan:   1.Intractable focal epilepsy s/p VNS placement. Has continued to have seizures despite increase in levetiracetam and is having increased tiredness. Will try TID dosing and if this doesn't help could consider " trying XR and she feel that helped her tolerate carbamazepine. MRI and neuropsych have been ordered. MRI scheduled and neuropsych not yet scheduled.       -try changing levetiracetam to [phone]  -could consider trying XR in future   -she has scheduled MRI at [facility] (however we discussed she will need nurse visit before/after to turn VNS off/on. I have asked [facility]  to schedule this).   -follow-up with Dr. Gillespie as scheduled 11/2024      Padmini Redmond PA-C     Transition Care Management Services  Admission date 9/26/24  Discharge date 10/9/24  Discharge diagnosis Intractable focal epilepsy  Interactive contact date: 10/11/24  Face-to-face visit date: 10/23/2024    Medical complexity decision making: High complexity ([id number])    Total time on video today 19 min. Additional 5 min reviewing chart prior to visit. Additional 8 min generating note and coordinating care following visit. So total of 32 min, all on day of visit.

## 2024-10-23 NOTE — NURSING NOTE
Current patient location: [address]    [place] [address]    Is the patient currently in the state of [place]? YES    Visit mode:VIDEO    If the visit is dropped, the patient can be reconnected by: VIDEO VISIT: Text to cell phone:   Telephone Information:   Mobile [phone]       Will anyone else be joining the visit? NO  (If patient encounters technical issues they should call [phone]    Are changes needed to the allergy or medication list? No    Are refills needed on medications prescribed by this physician? NO    Rooming Documentation:  Questionnaire(s) not pre-assigned    Reason for visit: Follow Up    Lucy POST

## 2024-10-23 NOTE — LETTER
10/23/2024       RE: Mili Mane  [address]  [address]  [place] [address]     Dear Colleague,    Thank you for referring your patient, Mili Mane, to the [facility] [place] at [facility]. Please see a copy of my visit note below.    Virtual Visit Details    Type of service:  Video Visit   Video Start Time:  10:01am  Video End Time: 10:20am    Originating Location (pt. Location): Home    Distant Location (provider location):  On-site  Platform used for Video Visit: [employer]      [facility] Epilepsy Care Progress Note      Patient:  Mili Mane  :  1965   Age:  59 year old   Today's Video Visit:  10/23/2024    Epilepsy Data:  Right-handed female with a history of intractable focal epilepsy s/p VNS placement, depression, anxiety and HTN. She reports seizure onset at age 18 months      Seizure types:  Type 1: May have warning, not always. Stares, unresponsive and has been noted to have hand movements. Lasts about 2 minutes. She is amnestic for what transpires. After confused and may take 20 minutes to get back to baseline. Frequency unclear but patient feels like she notes periods of missed time nearly daily.      Type 2: May have a warning (description difficult to describe) as she as told her niece she is about to have a seizure before. Has stiffening, shaking, drooling. Eyes open. Lasts 4-5 minutes. Has bitten tongue and had urinary incontinence. After in confused, tired and sore. Sometimes she will wake up with a bitten tongue     VEEG 2024: Summary of 14 days of video-EEG monitoring: This video EEG was abnormal.  Independent bitemporal delta slowing was present.  During some days of the monitoring, rare independent epileptiform discharges were seen over the right temporal and left frontotemporal region.  During day 2 of the monitoring, a focal to bilateral tonic-clonic seizure was  "recorded with first ictal discharges seen over bitemporal regions 30 seconds after patient's aura, with best evolution over the right temporal region.  Following termination of tonic-clonic seizure, a left hemispheric ictal discharge continued without interruption, and spread to right hemisphere.     On day 7 of the monitoring, the patient had a focal seizure out of sleep with a left temporal (at T3/T5 maxima) onset.  On day 8 she had a focal to bilateral tonic-clonic seizure of left temporal onset.  There was a brief pause in the ictal discharge following termination of tonic-clonic seizure and then there was a right frontotemporal ictal discharge.  On day 11 of the monitoring, the patient had a focal impaired-aware seizure with the first ictal discharge seen over the left frontotemporal region, 45 seconds after patient's aura, followed by a pause and then right temporal ictal activities. On day 12 the patient had a focal impaired-aware seizure with initial ictal onset over the left temporal region, 70 seconds after the patient's aura. On day 13, the patient had an aura with amnesia for memory phrase with no EEG changes.      Overall, ictal pattern was complex.  All seizures except first one, had a late-onset left temporal ictal discharge, which was preceded by an aura typically 30-70 seconds earlier.  First seizure had a late  bitemporal discharge--30 seconds after aura, with best evolution in the right temporal lobe.  There was a distinct ictal discharge evolved after termination of seizures on day 2, 8 and 11 on the opposite side.   Although ictal EEG pattern was best seen in temporal lobes, clinical symptoms were not of typical temporal lobe seizures.  She had an aura of \"prickling/tingling\" over the top of her scalp consistently reported during the last 3 seizures.  Manual/oral automatisms, were not prominent symptoms and if any, they occurred later in the seizure, after seizure spread. She did not have a well " lateralizing/localizing symptom at seizure onset.    These findings may indicate an extratemporal seizure-focus.    History of Present Illness:   She reports 3-4 focal seizures since discharge. No generalized tonic-clonic seizures that she is aware of. Shewas discharged on PTA carbatrol 400-400 and increased levetiracetam [id number]. She does report increased tiredness. She feels a lot of seizures occur in early evening.     Current Outpatient Medications   Medication Sig Dispense Refill     atenolol (TENORMIN) 25 MG tablet Take 25 mg by mouth every evening       atenolol (TENORMIN) 50 MG tablet Take 50 mg by mouth every morning       CARBATROL 200 MG 12 hr capsule TAKE TWO CAPSULES BY MOUTH EVERY MORNING AND TAKE TWO CAPSULES BY MOUTH EVERY EVENING 360 capsule 1     levETIRAcetam (KEPPRA) 1000 MG tablet Take 2 tablets (2,000 mg) by mouth 2 times daily. 360 tablet 1     LORazepam (ATIVAN) 0.5 MG tablet TAKE ONE TABLET BY MOUTH EVERY EVENING (Patient taking differently: Take 0.5 mg by mouth every evening. TAKE ONE TABLET BY MOUTH EVERY EVENING) 90 tablet 1     medical cannabis (Patient's own supply) See Admin Instructions (The purpose of this order is to document that the patient reports taking medical cannabis.  This is not a prescription, and is not used to certify that the patient has a qualifying medical condition.)       Multiple Vitamins-Minerals (CENTRUM SILVER) per tablet Take 1 tablet by mouth daily          Perceived AED Side Effects:  Yes-tiredness    Medication Notes:  Carbatrol 400-400, levetiracetam [id number]      AED Medication Compliance:  compliant most of the time  Using a pill box:  Yes    Review of Systems:  Lethargy / Tiredness:  Yes  Slowed Cognitive Function:  No  Memory Problems:  Yes  Poor Balance:  No  Dizziness:  No    Other Issues:  Mood is doing ok  Is patient safe to drive:  No    Exam:  Alert and oriented. Speech fluent. Hearing intact to normal conversation. Face symmetric. Does not  "appear overtly depressed or anxious. Does appear tired.   Ht 1.575 m (5' 2.01\")   Wt 66.7 kg (147 lb)   LMP  (LMP Unknown)   BMI 26.88 kg/m       Wt Readings from Last 5 Encounters:   10/23/24 66.7 kg (147 lb)   08/06/24 66.7 kg (147 lb)   02/20/24 62.9 kg (138 lb 9.6 oz)   01/12/24 61.3 kg (135 lb 3.2 oz)   11/21/23 60 kg (132 lb 3.2 oz)       Assessment and Plan:   1.Intractable focal epilepsy s/p VNS placement. Has continued to have seizures despite increase in levetiracetam and is having increased tiredness. Will try TID dosing and if this doesn't help could consider trying XR and she feel that helped her tolerate carbamazepine. MRI and neuropsych have been ordered. MRI scheduled and neuropsych not yet scheduled.       -try changing levetiracetam to [phone]  -could consider trying XR in future   -[place] [facility] [place]however we discussed she will need nurse visit before/after to turn VNS off/on. I have asked [facility]  to schedule this).   -follow-up with Dr. Gillespie as scheduled 11/2024      Padmini Redmond PA-C     Transition Care Management Services  Admission date 9/26/24  Discharge date 10/9/24  Discharge diagnosis Intractable focal epilepsy  Interactive contact date: 10/11/24  Face-to-face visit date: 10/23/2024    Medical complexity decision making: High complexity ([id number])    Total time on video today 19 min. Additional 5 min reviewing chart prior to visit. Additional 8 min generating note and coordinating care following visit. So total of 32 min, all on day of visit.                          Again, thank you for allowing me to participate in the care of your patient.      Sincerely,    NOE Grimaldo    "

## 2024-10-24 NOTE — PATIENT INSTRUCTIONS
-try changing levetiracetam to [phone]  -could consider trying XR in future   -she has scheduled MRI at [facility] [place] (however we discussed she will need nurse visit before/after to turn VNS off/on. I have asked [facility]  to schedule this).   -follow-up with Dr. Gillespie as scheduled 11/2024

## 2024-10-30 ENCOUNTER — TELEPHONE (OUTPATIENT)
Dept: NEUROLOGY | Facility: CLINIC | Age: 59
End: 2024-10-30

## 2024-10-30 NOTE — TELEPHONE ENCOUNTER
The niece calling wanting to be sure that the referral for the MRI for the patient is ready so she can schedule appointment.

## 2024-10-31 NOTE — TELEPHONE ENCOUNTER
Call placed to patient niece.  Voice messaging left including the information that the order has been placed, and she can call scheduling to arrange the appointment if she is to have it done within the [facility] system, or if she needs the order faxing elsewhere, she can call [facility] at [phone] to ask for the order to be faxed.

## 2024-11-27 ENCOUNTER — OFFICE VISIT (OUTPATIENT)
Dept: NEUROLOGY | Facility: CLINIC | Age: 59
End: 2024-11-27
Payer: COMMERCIAL

## 2024-11-27 VITALS
OXYGEN SATURATION: 96 % | HEART RATE: 71 BPM | DIASTOLIC BLOOD PRESSURE: 81 MMHG | HEIGHT: 62 IN | BODY MASS INDEX: 26.88 KG/M2 | SYSTOLIC BLOOD PRESSURE: 168 MMHG | TEMPERATURE: 97.5 F

## 2024-11-27 DIAGNOSIS — G40.119 INTRACTABLE FOCAL EPILEPSY (H): ICD-10-CM

## 2024-11-27 DIAGNOSIS — G40.109 LOCALIZATION-RELATED FOCAL EPILEPSY WITH SIMPLE PARTIAL SEIZURES (H): Primary | ICD-10-CM

## 2024-11-27 ASSESSMENT — PATIENT HEALTH QUESTIONNAIRE - PHQ9: SUM OF ALL RESPONSES TO PHQ QUESTIONS 1-9: 14

## 2024-11-27 NOTE — PATIENT INSTRUCTIONS
Times of Days           Medication Tablet Size Number of Tablets/Capsules Total Daily Dosage    carbatrol  200 mg  2    2      800 mg                      levetiracetam 1000 mg  1 1/2    2      3500 mg                                                                                               Reduce levetiracetam a little as above. Keep taking carbamazepine as above.    Get MRI of brain done. We will reschedule today. She will need to have VNS turned off prior to MRI and then turned on after MRI.  should arrange for you today.    Carry this with you at all times.  CONTINUE TAKING YOUR OTHER MEDICATIONS AS PREVIOUSLY DIRECTED.      * * *Do not store medications in the bathroom.  Keep medications away from children!* * *

## 2024-11-27 NOTE — LETTER
2024       RE: Mili Mane  : 1965   MRN: [id number]      Dear Colleague,    Thank you for referring your patient, Mili Mane, to the [facility] EPILEPSY CARE at [facility]. Please see a copy of my visit note below.    NEW EPILEPSY EVALUATION    DATE OF VISIT: 2024  NAME: Mili Mane  MRN: [id number]    HISTORY    EPILEPSY HISTORY: 59-year-old right-handed woman presents for further evaluation and treatment of her medically intractable epilepsy.  She had been followed by Dr. Crews for many years and has recently been followed by Dr. Hernandez for almost 3 years.  She presents with her niece who provides additional information and extensive information is available from electronic medical records.    Patient reports seizure onset 18 months.  Seizures were initially focal impaired seizures.  Bilateral tonic-clonic seizures started around puberty.  She was trialed with multiple medications with no control.  Experienced status epilepticus in  when she was unable to keep meds down due to a flu.  VNS was initiated and helped.  Over the last several years she reports that her seizures have become more frequent.  She was admitted for video EEG monitoring and reevaluation about 2 months ago with results as below.  Presents now for further follow-up.  She describes her seizures as follows.    SPELL TYPE 1: Focal aware to focal impaired seizure.  Reports a warning before some seizures.  Describes a stereotyped auditory hallucination of a ringing bell.  States that this increases in amplitude and then she loses connection.  Automatic activity is described.  Amnestic during the seizure.  Postictally family reports that she speaks poorly.  She describes 5 such as seizures in October and 10 such as seizures in November.    SPELL TYPE 2: Bilateral tonic-clonic seizures.  Does not recall aura prior to these.  Observers described  collapse, stiffness, jerking, unresponsiveness.  Jerking last for several minutes.  Takes about an hour before she returns back to normal following the seizure.  Tongue bite and urinary incontinence have occurred.  She reports 1 such as seizure in November and does not believe that she had any in October.    RISK FACTORS FOR EPILEPSY: Best that she knows of birth and delivery were normal.  Reports that development was delayed.  Went to school and took regular classes.  Denies knowledge of febrile convulsions, meningitis, encephalitis, brain strokes, brain tumors, significant head trauma, street drug or alcohol use.    PREVIOUS EVALUATION FOR EPILEPSY: I am unable to find MRI of the brain in [facility] [place] or in care everywhere.  CT scan head 5/24/2018 normal (report only Care Everywhere).  Video EEG x 13 days September 2024 showed independent bitemporal epileptiform discharges.  1 seizure was recorded with bilateral discharge at onset right hemisphere higher in amplitude than left hemisphere.  This led to bilateral tonic-clonic seizure and then a postictal left temporal seizure discharge.  3 seizures were recorded with left temporal onset, 1 of which had onset at T3-T5.  These propagated to the right hemisphere resulting in an independent right hemispheric discharge in all cases.  Scalp ictal EEG onset followed event marks and some seizures.    CURRENT ANTISEIZURE MEDICATIONS: Carbatrol (200 mg) 400 mg twice daily levetiracetam (1000 mg) 2000 mg twice daily.  Levetiracetam was increased from 3500 mg to 4000 mg/day following last admission.  Patient reports more sedation since then and would like to reduce the medication.  Previous carbamazepine levels have ranged between 9 and 14, levetiracetam levels typically in the mid 20s.    SIGNIFICANT OTHER MEDICATIONS: Atenolol 75 mg/day, medical cannabis.    PREVIOUS ANTISEIZURE MEDICATIONS: Previously treated with divalproex, dose uncertain, for 9 years.  Treated with  phenytoin which reportedly caused gum hypertrophy but further details not available.  Lamotrigine was used, no details.  Phenobarbital and primidone both caused a rash.  Oxcarbazepine used, maximum 900 mg/day, cause significant hyponatremia.  Topiramate caused nausea.  Lacosamide max 400 mg/day, max level 7.5.  Perampanel max dose 4 mg/day caused the fatigue.  I cannot find indication of treatment with felbamate, tiagabine, zonisamide, cenobamate, gabapentin or pregabalin.  She is being treated with a vagal nerve stimulator.    SIGNIFICANT PAST MEDICAL HISTORY: Hypertension.  There are vague notes about cardiomyopathy (see cardiology notes 2017) and left bundle branch block.  She denies coronary artery disease or heart attacks.    FAMILY HISTORY: No family history of seizures.    PSYCHOSOCIAL HISTORY: Born and raised in [place].  Stable early family life with no sexual abuse.  Graduated from high school.  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  Eventually placed on disability.  Resides in an assisted living situation which she is happy with.  Reports periods of low mood and moderate anxiety.  Reports that she is easily overwhelmed and has challenges with multiple medical tests.  Her PHQ-9 today equal 14.  She is not being treated with antidepressant medications.  Does follow with a therapist whom she thinks helps.  Denies hospitalizations for depression, suicidal ideation or suicide attempts.  Denies suicidal ideation recently.    She finds seizures disabling.  When asked how her life would be different if she had no epilepsy she reports that she would have increased independence, would be able to do more volunteer work, and would be less easily overwhelmed.  She has never learned to drive and has never driven automobiles.  She especially finds that the convulsive seizures troubling and would like these to stop.  She  is open to more aggressive treatment of her epilepsy.    REVIEW OF SYSTEMS: Denies headaches.  Denies dysphagia.  Denies shortness of breath or chest pain.  Denies abdominal pain or diarrhea.  Denies dysuria hematuria flank pain or kidney stones.  Denies fractures.  Occasional falls related to seizures.  Postmenopausal.      PHYSICAL EXAM    VITAL SIGNS: Blood pressure 165/80.  Pulse 70 and regular.  Weight 66 kg, BMI around 27.  GENERAL PHYSICAL EXAM: Heart exam without murmur.  Regular rate and rhythm.  No hemiatrophy.  NEUROLOGICAL EXAM: Pleasant and cooperative woman.  Does not appear depressed.  Normal straightaway gait.  Romberg negative.  Visual fields full.  Extraocular movements intact.  Normal smooth pursuit.  No nystagmus.  Smile symmetrical.  Tongue midline.  No drift, pronation, or tremor.  Strength is full proximally and distally in arms and legs.  Reflexes present and symmetrical including ankle jerks.  Plantar responses go down bilaterally.  Vibratory sensation is preserved.  Finger finger-nose and heel-knee-shin is done well.      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  Clear indication of bilateral epileptogenicity and she probably has bilateral independent seizure onset areas but this is not entirely clear.  Stereotyped auditory hallucinations prior to many seizures suggest temporal opercular seizure onset.  She may have an extra temporal onset with variable seizure propagation.  2) medically refractory.  Significant seizure burden.  Persistent tonic-clonic seizures and spite of therapeutic levels of 2 appropriate antiseizure medications and vagal nerve stimulator.  3) probably some cognitive impairment.  Moderate anxiety, currently untreated.  Reports that she is easily overwhelmed.  Nonetheless, she seems open to more aggressive treatment of her seizures.      DISCUSSION: Above discussed frankly and supportively.  We discussed the  potential severe consequences of convulsive seizures including trauma, further cognitive impairment, and the small possibility of sudden death.  More aggressive treatment recommended and patient was fine with this.  We discussed various remaining treatment options.  Other medication options exist (see above).  However, these are very unlikely to result in significant improvement.  We discussed the potential for ablative,resective, and neuromodulation treatments.  Discussed potential need for intracranial recording.  Discussed remaining evaluation that would be needed before we could proceed.  We emphasized the need for epilepsy directed MRI as well as PET scanning.  Emphasized that the fact that she has a VNS would require use of the special MRI technique and that VNS would have to be turned off prior to study and return back down.  She was willing to proceed.      PLAN:   1) anticonvulsant levels today to confirm compliance, rule out toxicity.  2) continue current carbamazepine dose.  Reduce levetiracetam to 1500 mg in a.m. and 2000 mg in p.m.  Precise regimen provided in AVS.  3) moved VNS to 30 seconds on 3 minutes off, other settings the same.  Short-term goal would be to move to step to rapid cycling to see if this helps.  4) reordered MRI of brain epilepsy protocol.  Asked  to have VNS turned off prior to study and turned back on after study.  Informed the patient of the need to do this.  Emphasized that patient has VNS in order.  5) neuropsych testing has been ordered and is scheduled for October 2025 (sic); will try to advance.  6) after MRI, we will move forward to obtain PET scan and consider intracarotid Amytal testing.  7) as far as anticonvulsant medications are concerned, Felbatol, tiagabine, zonisamide, and cenobamate are remaining options.  Would avoid a zonisamide or cenobamate if intracarotid Amytal testing is planned or further monitoring is planned in the short-term.  8) return to  clinic in 3 months.    Total time in person today 60 min. Additional 18 min reviewing chart prior to visit. Additional 22 min generating note and coordinating care following visit. So total of 100 min, all on day of visit. Discussed with niece who provided unique information.  Reviewed multiple labs and notes as summarized above. The longitudinal plan of care for this patient's epilepsy (including epilepsy comorbidities if appropriate) was addressed during this visit. Due to the added complexity in care, I will continue to support this patient in the subsequent management of this condition(s) and with the ongoing continuity of care of this condition(s).    Dario Gillespie MD          Again, thank you for allowing me to participate in the care of your patient.      Sincerely,    Dario Gillespie MD

## 2024-11-28 LAB
ALT SERPL W P-5'-P-CCNC: 15 U/L (ref 0–50)
CARBAMAZEPINE SERPL-MCNC: 6.7 UG/ML (ref 4–12)
LEVETIRACETAM SERPL-MCNC: 26.9 ÂΜG/ML (ref 10–40)
SODIUM SERPL-SCNC: 136 MMOL/L (ref 135–145)

## 2024-11-28 NOTE — PROGRESS NOTES
NEW EPILEPSY EVALUATION    DATE OF VISIT: 11/27/2024  NAME: Mili Mane  MRN: [id number]    HISTORY    EPILEPSY HISTORY: 59-year-old right-handed woman presents for further evaluation and treatment of her medically intractable epilepsy.  She had been followed by Dr. Crews for many years and has recently been followed by Dr. Hernandez for almost 3 years.  She presents with her niece who provides additional information and extensive information is available from electronic medical records.    Patient reports seizure onset 18 months.  Seizures were initially focal impaired seizures.  Bilateral tonic-clonic seizures started around puberty.  She was trialed with multiple medications with no control.  Experienced status epilepticus in 1999 when she was unable to keep meds down due to a flu.  VNS was initiated and helped.  Over the last several years she reports that her seizures have become more frequent.  She was admitted for video EEG monitoring and reevaluation about 2 months ago with results as below.  Presents now for further follow-up.  She describes her seizures as follows.    SPELL TYPE 1: Focal aware to focal impaired seizure.  Reports a warning before some seizures.  Describes a stereotyped auditory hallucination of a ringing bell.  States that this increases in amplitude and then she loses connection.  Automatic activity is described.  Amnestic during the seizure.  Postictally family reports that she speaks poorly.  She describes 5 such as seizures in October and 10 such as seizures in November.    SPELL TYPE 2: Bilateral tonic-clonic seizures.  Does not recall aura prior to these.  Observers described collapse, stiffness, jerking, unresponsiveness.  Jerking last for several minutes.  Takes about an hour before she returns back to normal following the seizure.  Tongue bite and urinary incontinence have occurred.  She reports 1 such as seizure in November and does not believe that she had any in  October.    RISK FACTORS FOR EPILEPSY: Best that she knows of birth and delivery were normal.  Reports that development was delayed.  Went to school and took regular classes.  Denies knowledge of febrile convulsions, meningitis, encephalitis, brain strokes, brain tumors, significant head trauma, street drug or alcohol use.    PREVIOUS EVALUATION FOR EPILEPSY: I am unable to find MRI of the brain in [facility] or in care everywhere.  CT scan head 5/24/2018 normal (report only Care Everywhere).  Video EEG x 13 days September 2024 showed independent bitemporal epileptiform discharges.  1 seizure was recorded with bilateral discharge at onset right hemisphere higher in amplitude than left hemisphere.  This led to bilateral tonic-clonic seizure and then a postictal left temporal seizure discharge.  3 seizures were recorded with left temporal onset, 1 of which had onset at T3-T5.  These propagated to the right hemisphere resulting in an independent right hemispheric discharge in all cases.  Scalp ictal EEG onset followed event marks and some seizures.    CURRENT ANTISEIZURE MEDICATIONS: Carbatrol (200 mg) 400 mg twice daily levetiracetam (1000 mg) 2000 mg twice daily.  Levetiracetam was increased from 3500 mg to 4000 mg/day following last admission.  Patient reports more sedation since then and would like to reduce the medication.  Previous carbamazepine levels have ranged between 9 and 14, levetiracetam levels typically in the mid 20s.    SIGNIFICANT OTHER MEDICATIONS: Atenolol 75 mg/day, medical cannabis.    PREVIOUS ANTISEIZURE MEDICATIONS: Previously treated with divalproex, dose uncertain, for 9 years.  Treated with phenytoin which reportedly caused gum hypertrophy but further details not available.  Lamotrigine was used, no details.  Phenobarbital and primidone both caused a rash.  Oxcarbazepine used, maximum 900 mg/day, cause significant hyponatremia.  Topiramate caused nausea.  Lacosamide max 400 mg/day, max  level 7.5.  Perampanel max dose 4 mg/day caused the fatigue.  I cannot find indication of treatment with felbamate, tiagabine, zonisamide, cenobamate, gabapentin or pregabalin.  She is being treated with a vagal nerve stimulator.    SIGNIFICANT PAST MEDICAL HISTORY: Hypertension.  There are vague notes about cardiomyopathy (see cardiology notes 2017) and left bundle branch block.  She denies coronary artery disease or heart attacks.    FAMILY HISTORY: No family history of seizures.    PSYCHOSOCIAL HISTORY: Born and raised in [place].  Stable early family life with no sexual abuse.  Graduated from high school.  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  Eventually placed on disability.  Resides in an assisted living situation which she is happy with.  Reports periods of low mood and moderate anxiety.  Reports that she is easily overwhelmed and has challenges with multiple medical tests.  Her PHQ-9 today equal 14.  She is not being treated with antidepressant medications.  Does follow with a therapist whom she thinks helps.  Denies hospitalizations for depression, suicidal ideation or suicide attempts.  Denies suicidal ideation recently.    She finds seizures disabling.  When asked how her life would be different if she had no epilepsy she reports that she would have increased independence, would be able to do more volunteer work, and would be less easily overwhelmed.  She has never learned to drive and has never driven automobiles.  She especially finds that the convulsive seizures troubling and would like these to stop.  She is open to more aggressive treatment of her epilepsy.    REVIEW OF SYSTEMS: Denies headaches.  Denies dysphagia.  Denies shortness of breath or chest pain.  Denies abdominal pain or diarrhea.  Denies dysuria hematuria flank pain or kidney stones.  Denies fractures.  Occasional falls related to  seizures.  Postmenopausal.      PHYSICAL EXAM    VITAL SIGNS: Blood pressure 165/80.  Pulse 70 and regular.  Weight 66 kg, BMI around 27.  GENERAL PHYSICAL EXAM: Heart exam without murmur.  Regular rate and rhythm.  No hemiatrophy.  NEUROLOGICAL EXAM: Pleasant and cooperative woman.  Does not appear depressed.  Normal straightaway gait.  Romberg negative.  Visual fields full.  Extraocular movements intact.  Normal smooth pursuit.  No nystagmus.  Smile symmetrical.  Tongue midline.  No drift, pronation, or tremor.  Strength is full proximally and distally in arms and legs.  Reflexes present and symmetrical including ankle jerks.  Plantar responses go down bilaterally.  Vibratory sensation is preserved.  Finger finger-nose and heel-knee-shin is done well.      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  Clear indication of bilateral epileptogenicity and she probably has bilateral independent seizure onset areas but this is not entirely clear.  Stereotyped auditory hallucinations prior to many seizures suggest temporal opercular seizure onset.  She may have an extra temporal onset with variable seizure propagation.  2) medically refractory.  Significant seizure burden.  Persistent tonic-clonic seizures and spite of therapeutic levels of 2 appropriate antiseizure medications and vagal nerve stimulator.  3) probably some cognitive impairment.  Moderate anxiety, currently untreated.  Reports that she is easily overwhelmed.  Nonetheless, she seems open to more aggressive treatment of her seizures.      DISCUSSION: Above discussed frankly and supportively.  We discussed the potential severe consequences of convulsive seizures including trauma, further cognitive impairment, and the small possibility of sudden death.  More aggressive treatment recommended and patient was fine with this.  We discussed various remaining treatment options.  Other medication options exist (see  above).  However, these are very unlikely to result in significant improvement.  We discussed the potential for ablative,resective, and neuromodulation treatments.  Discussed potential need for intracranial recording.  Discussed remaining evaluation that would be needed before we could proceed.  We emphasized the need for epilepsy directed MRI as well as PET scanning.  Emphasized that the fact that she has a VNS would require use of the special MRI technique and that VNS would have to be turned off prior to study and return back down.  She was willing to proceed.      PLAN:   1) anticonvulsant levels today to confirm compliance, rule out toxicity.  2) continue current carbamazepine dose.  Reduce levetiracetam to 1500 mg in a.m. and 2000 mg in p.m.  Precise regimen provided in AVS.  3) moved VNS to 30 seconds on 3 minutes off, other settings the same.  Short-term goal would be to move to step to rapid cycling to see if this helps.  4) reordered MRI of brain epilepsy protocol.  Asked  to have VNS turned off prior to study and turned back on after study.  Informed the patient of the need to do this.  Emphasized that patient has VNS in order.  5) neuropsych testing has been ordered and is scheduled for October 2025 (sic); will try to advance.  6) after MRI, we will move forward to obtain PET scan and consider intracarotid Amytal testing.  7) as far as anticonvulsant medications are concerned, Felbatol, tiagabine, zonisamide, and cenobamate are remaining options.  Would avoid a zonisamide or cenobamate if intracarotid Amytal testing is planned or further monitoring is planned in the short-term.  8) return to clinic in 3 months.    Total time in person today 60 min. Additional 18 min reviewing chart prior to visit. Additional 22 min generating note and coordinating care following visit. So total of 100 min, all on day of visit. Discussed with niece who provided unique information.  Reviewed multiple labs and  notes as summarized above. The longitudinal plan of care for this patient's epilepsy (including epilepsy comorbidities if appropriate) was addressed during this visit. Due to the added complexity in care, I will continue to support this patient in the subsequent management of this condition(s) and with the ongoing continuity of care of this condition(s).    Dario Gillespie MD

## 2024-12-28 ENCOUNTER — HEALTH MAINTENANCE LETTER (OUTPATIENT)
Age: 59
End: 2024-12-28

## 2025-01-02 DIAGNOSIS — G40.119 INTRACTABLE FOCAL EPILEPSY (H): ICD-10-CM

## 2025-01-02 RX ORDER — LEVETIRACETAM 1000 MG/1
2000 TABLET ORAL 2 TIMES DAILY
Qty: 360 TABLET | Refills: 1 | Status: CANCELLED | OUTPATIENT
Start: 2025-01-02

## 2025-01-02 RX ORDER — LEVETIRACETAM 500 MG/1
1500 TABLET ORAL 2 TIMES DAILY
Qty: 180 TABLET | Refills: 5 | Status: SHIPPED | OUTPATIENT
Start: 2025-01-02

## 2025-01-02 NOTE — TELEPHONE ENCOUNTER
Per Dr. Gillespie, pt's keppra dose approved to decrease to 3000 mg daily with 500 mg tablets. For additional reduction or adjustment, pt must contact Dr. Gillespie. Refill of adjusted dose sent to pharmacy.

## 2025-01-02 NOTE — TELEPHONE ENCOUNTER
Patient wants to take 500 mg keppra instead of the 1,000 mg tablets. Patient you experiencing a lot of headached and hard time digesting the medication. Patient is wanting to go back to a lower dose.

## 2025-01-16 ENCOUNTER — TRANSFERRED RECORDS (OUTPATIENT)
Dept: HEALTH INFORMATION MANAGEMENT | Facility: CLINIC | Age: 60
End: 2025-01-16

## 2025-01-16 ENCOUNTER — ALLIED HEALTH/NURSE VISIT (OUTPATIENT)
Dept: NEUROLOGY | Facility: CLINIC | Age: 60
End: 2025-01-16
Payer: COMMERCIAL

## 2025-01-16 DIAGNOSIS — G40.119 INTRACTABLE FOCAL EPILEPSY (H): Primary | ICD-10-CM

## 2025-01-16 NOTE — PROGRESS NOTES
[facility] VNS adjustment Note  Patient: Mili Mane  : 1965   Age: 59 year old  Today's Office Visit: 2025  Primary [facility] Provider: Enedelia Boyer PA-C  Interval History: Mili Mane comes into clinic today at the request of Enedelia Boyer PA-C Ordering Provider for VNS adjustment prior to MRI as per guidelines    This service provided today was under the supervising provider of the day Enedelia Boyer PA-C, who was available if needed.    VNS Management (VNS Flowsheet)      2025    10:35 AM 2025    10:40 AM   Vagus Nerve Stimulation   MD Merlin Boyer   Implant Date 2017   Model Number [id number]   Serial Number [id number]   Patient ID ECA [id number]   VNS Interrogation Incoming Parameters Outgoing Parameters   Date 2025   Output Current (mA) 1.25 0   Signal Frequency (Hz) 20 20   Pulse Width (usec) 250 250   Signal ON Time (sec) 30 30   Signal OFF Time (min) 3 3   Magent Output Current (mA) 1.5 0   Magent ON Time (sec) 60 60   Magnet Pulse Width (usec) 250 250   AutoStim Output Current (mA) 1.25 -   AutoStim Pulse Width (usec) 250 -   AutoStim ON Time (sec) 30 -   Tachycardia Detection ON/OFF on off   Heartbeat Detection Sensitivity (1-5) 2    Perform Verify Heartbeat Detection (y/n) No N/A   Threshold for AutoStim (%) 40    Output Current (OK/Low) OK    Current Delivered (mA) 1.25    Impedance Value (Ohms) 2111    Battery Status Indicator (Green/Yellow/Red, %) 25-50%    IFI (No/Yes) No No   Number of Magnet Swipes 0.04/day    Average AutoStims per day 27.36       ASSESSMENT/PLAN:   VNS was adjusted to MRI compatible parameters as per guidelines ( / FDA)  Patient will return to clinic following completion of the MRI for reprogramming back to initial settings.  Letter provided to patient, and faxed to [facility]

## 2025-01-16 NOTE — PROGRESS NOTES
[facility]  Patient: Mili Mane  : 1965   Age: 59 year old  Today's Office Visit: 2025  Primary [facility] Provider: Enedelia Boyer PA-C  Interval History: patient seen to have the VNS restarted following a head MRI.  Device was interrogated, and reprogrammed as noted below.  VNS Management (VNS Flowsheet)      2025     2:00 PM 2025     2:02 PM   Vagus Nerve Stimulation   MD Merlin Boyer   Implant Date 2017   Model Number [id number]   Serial Number [id number]   Patient ID ECA ECA   VNS Interrogation Incoming Parameters Outgoing Parameters   Date 2025   Output Current (mA) 0 1.25   Signal Frequency (Hz) 20 20   Pulse Width (usec) 250 250   Signal ON Time (sec) 30 30   Signal OFF Time (min) 3 3   Magent Output Current (mA) 0 1.5   Magent ON Time (sec) 60 60   Magnet Pulse Width (usec) 250 25   AutoStim Output Current (mA)  1.25   AutoStim Pulse Width (usec)  250   AutoStim ON Time (sec)  30   Tachycardia Detection ON/OFF off on   Heartbeat Detection Sensitivity (1-5)  2   Perform Verify Heartbeat Detection (y/n) N/A Yes   Threshold for AutoStim (%)  40   Output Current (OK/Low)  ok   Current Delivered (mA)  1.25   Impedance Value (Ohms)     Battery Status Indicator (Green/Yellow/Red, %)  Green 25-50%   IFI (No/Yes) No No        ASSESSMENT/PLAN:   Patient tolerated restart of stimulation well, and was able to leave without issue

## 2025-01-16 NOTE — LETTER
1/16/2025       RE: Mili Mane  [address]    [place] [facility] [place] [facility] [address]        Mili has been seen by a [facility] nurse, and the Vagus Nerve Stimulator identification shown below was confirmed.      Output current and magnet current have been set to zero, Autostim function has been disabled, and the device has been re-interrogated to confirm MRI appropriate settings.      Once patient's MRI has been completed, Mili should return to [facility] [place], operated by [facility] clinic to have the VNS re-programmed.      Sincerely,        [facility]  [phone]        Bag Borrow or Steal Vagus Nerve Stimulator interrogated:-  Patient ID ECA, Model , Serial # [id number], Implant date Jun 20, 2017.

## 2025-02-20 ENCOUNTER — TELEPHONE (OUTPATIENT)
Dept: NEUROLOGY | Facility: CLINIC | Age: 60
End: 2025-02-20

## 2025-02-20 NOTE — TELEPHONE ENCOUNTER
Per TE on 1/2/25 - patient called in requesting a lower dose of Keppra along with 500 mg tablets so order was approved and changed to take 3 tablets (1,500 mg) by mouth 2 times daily.    Call returned to patient and received VM. Left message informing her of dose change in early Jan that she had requested which is why prescription is now for 3 tablets (1,500 mg) BID. Informed her that this was approved by Dr. Gillespie and that she should continue on this dose for now. Left CBN if questions.

## 2025-02-20 NOTE — TELEPHONE ENCOUNTER
Pt states Keppra 500 mg medication Rx is incorrect. The evening dose should be 4 tablets and it currently states 3 tablets. Pt is requesting that this be corrected on the Rx.

## 2025-03-05 ENCOUNTER — VIRTUAL VISIT (OUTPATIENT)
Dept: NEUROLOGY | Facility: CLINIC | Age: 60
End: 2025-03-05
Payer: COMMERCIAL

## 2025-03-05 DIAGNOSIS — G40.219 PARTIAL SYMPTOMATIC EPILEPSY WITH COMPLEX PARTIAL SEIZURES, INTRACTABLE, WITHOUT STATUS EPILEPTICUS (H): Primary | ICD-10-CM

## 2025-03-05 NOTE — PROGRESS NOTES
Is the patient currently in the state of [place]? YES    Visit mode:VIDEO    If the visit is dropped, the patient can be reconnected by: VIDEO VISIT: Text to cell phone: [phone]    Will anyone else be joining the visit? YES: How would they like to receive their invitation? Text to cell phone: [phone]      How would you like to obtain your AVS? MyChart    Are changes needed to the allergy or medication list? NO    Reason for visit: Follow Up     [facility] Epilepsy Care Progress Note      Patient:  Mili Mane  :  1965   Age:  59 year old   Today's Office Visit:  3/5/2025    Epilepsy Data:    Patient History  Primary Epileptologist/Provider: Dario Gillespie M.D.  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)  Epilepsy Syndrome Status: Final  Age of Onset: 18 months  Other Relevant Dx/ Issues: HTN, LBBB, delayed development     Tests/Surgery History  Last EE2024  Last MRI: Pending  Last Neuropsych Testing: Pending  Epilepsy Surgery #1 Date: 02  Epilepsy Related Surgery #1 : Type: VNS placemenet  Epilepsy Surgery #2 Date: 17  Epilepsy Related Surgery #2 : Type : VNS replacement    Seizure Record  Current Visit Date: 25  Previous Visit Date: 24  Months since last visit: 3.22  Seizure Type 1: Simple partial onset followed by impairment of consciousness  Description of Sz Type 1: Reports a warning before some seizures.  Describes a stereotyped auditory hallucination of a ringing bell.  States that this increases in amplitude and then she loses connection.  Automatic activity is described.  Amnestic during the seizure.  Postictally family reports that she speaks poorly.  # of Type 1 Seizure since last visit: 16  Freq. Type 1 / Month: 4.97  Seizure Type 2: Partial seizures with secondary generalization  -  with simple partial seizures evolving to complex partial seizures  "evolving to generalized seizures  Description of Sz Type 2: convulsion  # of Type 2 Seizure since last visit: 6  Freq. Type 2 / Month: 1.86    Background History:  Onset 3 years old. Focal seizures (auditory hallucination ringing bell) followed by automatisms, amensia, impairment. Bilateral tonic clonic seizures. Refractory to carbamazepine, levetiracetam, lacosamide (max 400 mg, level 7.5), Intolerant VNS standard cycling. Intolerant perampanel. Treated with DVP, PHT, levetiracetam, lamotrigine, OXC, details not available. Overt allergy to methsuximide, phenobarbital. No indication of treatment with GP, PGB, FBM, TGB, ZNS, CNB. VEEG Sep 2024 with three seizures left temporal onset (one of three left posterior temporal) with independent late right temporal discharge; one right temporal dominant seizure with BTC with postictal left temporal seizure; independent bitemporal IEDs. No MRI. Depression/anxiety, untreated.    History of Present Illness:     Video visit today. Patient seen with niece who provided additional information.  Seizures continue.   She classifies them as small seizures and medium seizures. She reports impairment with all seizures.  Most seizures are not witnessed. It is not clear whether \"medium\" and \"large\" seizures are convulsive or more intense FIS.  She denies trauma, findings self on floor, tongue bite, urinary incontinence or significant myogenic pain.    Last visit we increased VNS duty cycle; no trouble with increased VNS.    Current Outpatient Medications   Medication Sig Dispense Refill    atenolol (TENORMIN) 25 MG tablet Take 25 mg by mouth every evening      atenolol (TENORMIN) 50 MG tablet Take 50 mg by mouth every morning      carBAMazepine (TEGRETOL) 100 MG chewable tablet Take 4 tabs (400mg) at 6am 2/19/2025, and 2 tabs (200 mg) as soon as possible after your procedure. Use instead of Carbatrol 6 tablet 0    CARBATROL 200 MG 12 hr capsule TAKE TWO CAPSULES BY MOUTH EVERY MORNING AND " TAKE TWO CAPSULES BY MOUTH EVERY EVENING 360 capsule 1    levETIRAcetam (KEPPRA) 500 MG tablet Take 3 tablets (1,500 mg) by mouth 2 times daily. 180 tablet 5    LORazepam (ATIVAN) 0.5 MG tablet TAKE ONE TABLET BY MOUTH EVERY EVENING (Patient taking differently: Take 0.5 mg by mouth every evening. TAKE ONE TABLET BY MOUTH EVERY EVENING) 90 tablet 1    medical cannabis (Patient's own supply) See Admin Instructions (The purpose of this order is to document that the patient reports taking medical cannabis.  This is not a prescription, and is not used to certify that the patient has a qualifying medical condition.)      Multiple Vitamins-Minerals (CENTRUM SILVER) per tablet Take 1 tablet by mouth daily          Perceived AED Side Effects:  No  She is currently being treated with levetiracetam (500) 1500 mg twice a day.    Medication Notes:    AEDs reviewed and as above. No side effects.  AED Medication Compliance:  compliant all of the time  Using a pill box:  Yes    Review of Systems:  No vomiting, diarrhea, fevers, hematuria or kidney stones.  Have you experienced a traumatic fall since your last visit: NO  Are these falls related to your seizures:  Not Applicable    Other Issues:    Mood is doing fine.  Is patient safe to drive:  No    Woman Care:   Patient is:  postmenopausal.    Exam:    LMP  (LMP Unknown)      Wt Readings from Last 5 Encounters:   10/23/24 147 lb (66.7 kg)   08/06/24 147 lb (66.7 kg)   02/20/24 138 lb 9.6 oz (62.9 kg)   01/12/24 135 lb 3.2 oz (61.3 kg)   11/21/23 132 lb 3.2 oz (60 kg)     Self reported weight unchanged. Neurological examination was performed over video link. Alert and fully oriented. Language was fluent with normal content. Mood was normal with no indication of depression or anxiety. Normal straightaway gait. Extraocular movements intact without nystagmus. Smile symmetrial. Tongue midline. There is no drift, pronation, or tremor. Finger finger nose was done well. Rapid fine  "movements were done well and symmetrically.    3T MRI of brain performed at [facility] and personally reviewed. Some movement artefact. No obvious epileptogenic lesions. Fair amount of white matter disease that appears mostly in parasaggital regions bilaterally.    Neuropsychometric testing performed and reviewed. Non lateralizing. Memory adequate. Frontal dysfunction.    Carbamazepine level = 6.7. levetiracetam level = 27.    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  Clear indication of bilateral epileptogenicity and she probably has bilateral independent seizure onset areas but this is not entirely clear.  Stereotyped auditory hallucinations prior to many seizures suggest temporal opercular seizure onset.  She may have an extra temporal onset with variable seizure propagation.  2) medically refractory.  Significant seizure burden.  Persistent tonic-clonic seizures and spite of therapeutic levels of 2 appropriate antiseizure medications and vagal nerve stimulator.  3) probably some cognitive impairment.  Moderate anxiety, currently untreated.  Reports that she is easily overwhelmed.  Nonetheless, she seems open to more aggressive treatment of her seizures.     DISCUSSION: Above discussed frankly and supportively.  She remains keen on more aggressive treatment. \"Something must be done\". She is open to resective, ablative, and more aggressive neuromodulation treatments. Goals remain being more confident in interactions, being able to volunteer more. She does not believe that she will be able to learn to drive at this point and does not think that she will be able to engage in work for pay.     PLAN:   1) anticonvulsant levels today to confirm compliance, rule out toxicity.  2) continue current carbamazepine and levetiracetam dose. Continue current VNS.  3) PET scan of brain.  4) Case management conference following PET scan. Consideration of intracarotid amytal " testing at that time.  5) VNS rapid cycling, felbatol, tiagabine, zonisamide and cenobamate are remaining options. Would avoid zonisamide or cenobamate if further monitoring is planned.   6) return to clinic in 3 months.     Total time in person today 30 min. Additional 8 min reviewing chart prior to visit. Additional 8 min generating note and coordinating care following visit. So total of 46 min, all on day of visit. Reviewed multiple labs and tests as above. Discussed with family who provided additional information. Due to the added complexity in care, I will continue to support this patient in the subsequent management of this condition(s) and with the ongoing continuity of care of this condition(s).     Dario Gillespie MD

## 2025-03-05 NOTE — Clinical Note
Alfred Wilks. Have scheduled PET scan. Following this we are ready to present for case management conference. Would defer intracarotid amytal test for now.

## 2025-03-05 NOTE — LETTER
3/5/2025       RE: Mili Mane  : 1965   MRN: [id number]      Dear Colleague,    Thank you for referring your patient, Mili Mane, to the [facility] EPILEPSY CARE at [facility]. Please see a copy of my visit note below.    Is the patient currently in the state of [place]? YES    Visit mode:VIDEO    If the visit is dropped, the patient can be reconnected by: VIDEO VISIT: Text to cell phone: [phone]    Will anyone else be joining the visit? YES: How would they like to receive their invitation? Text to cell phone: [phone]      How would you like to obtain your AVS? MyChart    Are changes needed to the allergy or medication list? NO    Reason for visit: Follow Up     [facility] Epilepsy Care Progress Note      Patient:  Mili Mane  :  1965   Age:  59 year old   Today's Office Visit:  3/5/2025    Epilepsy Data:    Patient History  Primary Epileptologist/Provider: Dario Gillespie M.D.  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)  Epilepsy Syndrome Status: Final  Age of Onset: 18 months  Other Relevant Dx/ Issues: HTN, LBBB, delayed development     Tests/Surgery History  Last EE2024  Last MRI: Pending  Last Neuropsych Testing: Pending  Epilepsy Surgery #1 Date: 02  Epilepsy Related Surgery #1 : Type: VNS placemenet  Epilepsy Surgery #2 Date: 17  Epilepsy Related Surgery #2 : Type : VNS replacement    Seizure Record  Current Visit Date: 25  Previous Visit Date: 24  Months since last visit: 3.22  Seizure Type 1: Simple partial onset followed by impairment of consciousness  Description of Sz Type 1: Reports a warning before some seizures.  Describes a stereotyped auditory hallucination of a ringing bell.  States that this increases in amplitude and then she loses connection.  Automatic activity is  "described.  Amnestic during the seizure.  Postictally family reports that she speaks poorly.  # of Type 1 Seizure since last visit: 16  Freq. Type 1 / Month: 4.97  Seizure Type 2: Partial seizures with secondary generalization  -  with simple partial seizures evolving to complex partial seizures evolving to generalized seizures  Description of Sz Type 2: convulsion  # of Type 2 Seizure since last visit: 6  Freq. Type 2 / Month: 1.86    Background History:  Onset 3 years old. Focal seizures (auditory hallucination ringing bell) followed by automatisms, amensia, impairment. Bilateral tonic clonic seizures. Refractory to carbamazepine, levetiracetam, lacosamide (max 400 mg, level 7.5), Intolerant VNS standard cycling. Intolerant perampanel. Treated with DVP, PHT, levetiracetam, lamotrigine, OXC, details not available. Overt allergy to methsuximide, phenobarbital. No indication of treatment with GP, PGB, FBM, TGB, ZNS, CNB. VEEG Sep 2024 with three seizures left temporal onset (one of three left posterior temporal) with independent late right temporal discharge; one right temporal dominant seizure with BTC with postictal left temporal seizure; independent bitemporal IEDs. No MRI. Depression/anxiety, untreated.    History of Present Illness:     Video visit today. Patient seen with niece who provided additional information.  Seizures continue.   She classifies them as small seizures and medium seizures. She reports impairment with all seizures.  Most seizures are not witnessed. It is not clear whether \"medium\" and \"large\" seizures are convulsive or more intense FIS.  She denies trauma, findings self on floor, tongue bite, urinary incontinence or significant myogenic pain.    Last visit we increased VNS duty cycle; no trouble with increased VNS.    Current Outpatient Medications   Medication Sig Dispense Refill     atenolol (TENORMIN) 25 MG tablet Take 25 mg by mouth every evening       atenolol (TENORMIN) 50 MG tablet " Take 50 mg by mouth every morning       carBAMazepine (TEGRETOL) 100 MG chewable tablet Take 4 tabs (400mg) at 6am 2/19/2025, and 2 tabs (200 mg) as soon as possible after your procedure. Use instead of Carbatrol 6 tablet 0     CARBATROL 200 MG 12 hr capsule TAKE TWO CAPSULES BY MOUTH EVERY MORNING AND TAKE TWO CAPSULES BY MOUTH EVERY EVENING 360 capsule 1     levETIRAcetam (KEPPRA) 500 MG tablet Take 3 tablets (1,500 mg) by mouth 2 times daily. 180 tablet 5     LORazepam (ATIVAN) 0.5 MG tablet TAKE ONE TABLET BY MOUTH EVERY EVENING (Patient taking differently: Take 0.5 mg by mouth every evening. TAKE ONE TABLET BY MOUTH EVERY EVENING) 90 tablet 1     medical cannabis (Patient's own supply) See Admin Instructions (The purpose of this order is to document that the patient reports taking medical cannabis.  This is not a prescription, and is not used to certify that the patient has a qualifying medical condition.)       Multiple Vitamins-Minerals (CENTRUM SILVER) per tablet Take 1 tablet by mouth daily          Perceived AED Side Effects:  No  She is currently being treated with levetiracetam (500) 1500 mg twice a day.    Medication Notes:    AEDs reviewed and as above. No side effects.  AED Medication Compliance:  compliant all of the time  Using a pill box:  Yes    Review of Systems:  No vomiting, diarrhea, fevers, hematuria or kidney stones.  Have you experienced a traumatic fall since your last visit: NO  Are these falls related to your seizures:  Not Applicable    Other Issues:    Mood is doing fine.  Is patient safe to drive:  No    Woman Care:   Patient is:  postmenopausal.    Exam:    LMP  (LMP Unknown)      Wt Readings from Last 5 Encounters:   10/23/24 147 lb (66.7 kg)   08/06/24 147 lb (66.7 kg)   02/20/24 138 lb 9.6 oz (62.9 kg)   01/12/24 135 lb 3.2 oz (61.3 kg)   11/21/23 132 lb 3.2 oz (60 kg)     Self reported weight unchanged. Neurological examination was performed over video link. Alert and fully  "oriented. Language was fluent with normal content. Mood was normal with no indication of depression or anxiety. Normal straightaway gait. Extraocular movements intact without nystagmus. Smile symmetrial. Tongue midline. There is no drift, pronation, or tremor. Finger finger nose was done well. Rapid fine movements were done well and symmetrically.    3T MRI of brain performed at [facility] and personally reviewed. Some movement artefact. No obvious epileptogenic lesions. Fair amount of white matter disease that appears mostly in parasaggital regions bilaterally.    Neuropsychometric testing performed and reviewed. Non lateralizing. Memory adequate. Frontal dysfunction.    Carbamazepine level = 6.7. levetiracetam level = 27.    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  Clear indication of bilateral epileptogenicity and she probably has bilateral independent seizure onset areas but this is not entirely clear.  Stereotyped auditory hallucinations prior to many seizures suggest temporal opercular seizure onset.  She may have an extra temporal onset with variable seizure propagation.  2) medically refractory.  Significant seizure burden.  Persistent tonic-clonic seizures and spite of therapeutic levels of 2 appropriate antiseizure medications and vagal nerve stimulator.  3) probably some cognitive impairment.  Moderate anxiety, currently untreated.  Reports that she is easily overwhelmed.  Nonetheless, she seems open to more aggressive treatment of her seizures.     DISCUSSION: Above discussed frankly and supportively.  She remains keen on more aggressive treatment. \"Something must be done\". She is open to resective, ablative, and more aggressive neuromodulation treatments. Goals remain being more confident in interactions, being able to volunteer more. She does not believe that she will be able to learn to drive at this point and does not think that she will be able to " engage in work for pay.     PLAN:   1) anticonvulsant levels today to confirm compliance, rule out toxicity.  2) continue current carbamazepine and levetiracetam dose. Continue current VNS.  3) PET scan of brain.  4) Case management conference following PET scan. Consideration of intracarotid amytal testing at that time.  5) VNS rapid cycling, felbatol, tiagabine, zonisamide and cenobamate are remaining options. Would avoid zonisamide or cenobamate if further monitoring is planned.   6) return to clinic in 3 months.     Total time in person today 30 min. Additional 8 min reviewing chart prior to visit. Additional 8 min generating note and coordinating care following visit. So total of 46 min, all on day of visit. Reviewed multiple labs and tests as above. Discussed with family who provided additional information. Due to the added complexity in care, I will continue to support this patient in the subsequent management of this condition(s) and with the ongoing continuity of care of this condition(s).     Dario Gillespie MD      Again, thank you for allowing me to participate in the care of your patient.      Sincerely,    Dario Gillespei MD

## 2025-03-12 ENCOUNTER — ALLIED HEALTH/NURSE VISIT (OUTPATIENT)
Dept: NEUROLOGY | Facility: CLINIC | Age: 60
End: 2025-03-12
Payer: COMMERCIAL

## 2025-03-12 DIAGNOSIS — G40.219 PARTIAL SYMPTOMATIC EPILEPSY WITH COMPLEX PARTIAL SEIZURES, INTRACTABLE, WITHOUT STATUS EPILEPTICUS (H): Primary | ICD-10-CM
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
## 2025-03-19 ENCOUNTER — LAB (OUTPATIENT)
Dept: LAB | Facility: CLINIC | Age: 60
End: 2025-03-19
Payer: COMMERCIAL

## 2025-03-19 DIAGNOSIS — G40.219 PARTIAL SYMPTOMATIC EPILEPSY WITH COMPLEX PARTIAL SEIZURES, INTRACTABLE, WITHOUT STATUS EPILEPTICUS (H): ICD-10-CM

## 2025-03-19 PROCEDURE — 36415 COLL VENOUS BLD VENIPUNCTURE: CPT

## 2025-03-20 LAB — LEVETIRACETAM SERPL-MCNC: 30.8 ÂΜG/ML (ref 10–40)

## 2025-03-25 LAB
CARBAMAZEPINE EP SERPL-MCNC: 1.7 UG/ML
CARBAMAZEPINE SERPL-MCNC: 5.1 UG/ML

## 2025-03-28 PROBLEM — G40.119 INTRACTABLE FOCAL EPILEPSY (H): Status: ACTIVE | Noted: 2024-09-26

## 2025-04-08 ENCOUNTER — LAB (OUTPATIENT)
Dept: LAB | Facility: CLINIC | Age: 60
End: 2025-04-08
Payer: COMMERCIAL

## 2025-04-08 DIAGNOSIS — G40.119 INTRACTABLE FOCAL EPILEPSY (H): ICD-10-CM

## 2025-04-08 PROCEDURE — 36415 COLL VENOUS BLD VENIPUNCTURE: CPT

## 2025-04-08 PROCEDURE — 80161 ASY CARBAMAZEPIN 10,11-EPXID: CPT | Mod: 90

## 2025-04-08 PROCEDURE — 80156 ASSAY CARBAMAZEPINE TOTAL: CPT | Mod: 90

## 2025-04-08 PROCEDURE — 99000 SPECIMEN HANDLING OFFICE-LAB: CPT

## 2025-04-13 ENCOUNTER — HEALTH MAINTENANCE LETTER (OUTPATIENT)
Age: 60
End: 2025-04-13

## 2025-04-13 LAB
CARBAMAZEPINE EP SERPL-MCNC: 1.7 UG/ML
CARBAMAZEPINE SERPL-MCNC: 6.1 UG/ML

## 2025-04-22 DIAGNOSIS — G40.209 PARTIAL SYMPTOMATIC EPILEPSY WITH COMPLEX PARTIAL SEIZURES, NOT INTRACTABLE, WITHOUT STATUS EPILEPTICUS (H): ICD-10-CM

## 2025-04-25 NOTE — TELEPHONE ENCOUNTER
"Last Written Prescription:     CARBATROL 200 MG 12 hr capsule 360 capsule 1 10/9/2024 -- Yes   Sig: TAKE TWO CAPSULES BY MOUTH EVERY MORNING AND TAKE TWO CAPSULES BY MOUTH EVERY EVENING     ----------------------  Last Visit Date: 3/5/2025 Jose Miguel  \"PLAN:   1) anticonvulsant levels today to confirm compliance, rule out toxicity.  2) continue current carbamazepine and levetiracetam dose. Continue current VNS.  3) PET scan of brain.  4) Case management conference following PET scan. Consideration of intracarotid amytal testing at that time.  5) VNS rapid cycling, felbatol, tiagabine, zonisamide and cenobamate are remaining options. Would avoid zonisamide or cenobamate if further monitoring is planned.   6) return to clinic in 3 months.\"     Future Visit Date: 6/6/25 Jose Miguel  ----------------------           Refill decision: Medication unable to be refilled by RN due to: Other: Last prescribed at hospital discharge 10/9/24 by alternate provider, Padmini Redmond  CARBATROL 200 MG 12 hr capsule          Sig: TAKE TWO CAPSULES BY MOUTH EVERY MORNING AND TAKE TWO CAPSULES BY MOUTH EVERY EVENING   Future Visit Date: 6/6/25 Jose Miguel        Request from pharmacy:  Requested Prescriptions   Pending Prescriptions Disp Refills    CARBATROL 200 MG 12 hr capsule 360 capsule 1     Sig: TAKE TWO CAPSULES BY MOUTH EVERY MORNING AND TAKE TWO CAPSULES BY MOUTH EVERY EVENING       Anti-Seizure Meds Protocol  Failed - 4/25/2025  7:54 AM        Failed - Review Authorizing provider's last note.      Refer to last progress notes: confirm request is for original authorizing provider (cannot be through other providers).          Passed - Normal ALT or AST on file in past 26 months     Recent Labs   Lab Test 11/27/24  1532   ALT 15     Recent Labs   Lab Test 09/26/24  1431   AST 19             Passed - Carbamazepine level within therapeutic range in last 26 months     No lab results found.    Carbamazepine level must be checked 2-4 weeks " after dosage change.            Passed - Medication is active on med list and the sig matches. RN to manually verify dose and sig if red X/fail.     If the protocol passes (green check), you do not need to verify med dose and sig.    A prescription matches if they are the same clinical intention.    For Example: once daily and every morning are the same.    The protocol can not identify upper and lower case letters as matching and will fail.     For Example: Take 1 tablet (50 mg) by mouth daily     TAKE 1 TABLET (50 MG) BY MOUTH DAILY    For all fails (red x), verify dose and sig.    If the refill does match what is on file, the RN can still proceed to approve the refill request.       If they do not match, route to the appropriate provider.             Passed - Has GFR on file in past 12 months and most recent value is normal        Passed - Recent (12 mo) or future (90 days) visit within the authorizing provider's specialty     The patient must have completed an in-person or virtual visit within the past 12 months or has a future visit scheduled within the next 90 days with the authorizing provider s specialty.  Urgent care and e-visits do not qualify as an office visit for this protocol.          Passed - Medication indicated for associated diagnosis     Medication is associated with one or more of the following diagnoses:     Bipolar   Dementia   Depression   Epilepsy   Migraine   Seizure   Trigeminal Neuralgia   Cyclothymia          Passed - No active pregnancy on record        Passed - No positive pregnancy test in last 12 months

## 2025-04-28 RX ORDER — CARBAMAZEPINE 200 MG/1
CAPSULE, EXTENDED RELEASE ORAL
Qty: 360 CAPSULE | Refills: 1 | Status: SHIPPED | OUTPATIENT
Start: 2025-04-28

## 2025-05-30 ENCOUNTER — TELEPHONE (OUTPATIENT)
Dept: NEUROLOGY | Facility: CLINIC | Age: 60
End: 2025-05-30

## 2025-05-30 NOTE — TELEPHONE ENCOUNTER
Patient showered independently, denies pain or further needs at this time.
Tele placed back on patient. Received Provider communication Form  Form to be completed. Form saved to [facility], encounter routed.     FRANK Tomas

## 2025-06-09 DIAGNOSIS — G40.119 INTRACTABLE FOCAL EPILEPSY (H): ICD-10-CM

## 2025-06-10 NOTE — TELEPHONE ENCOUNTER
Last Written Prescription:  1/2/25  180 : 5  ----------------------  Last Visit Date: 6/6/25  Future Visit Date: 0  [facility]  3 MOS  ----------------------    Refill decision: Medication refilled per  Medication Refill in Ambulatory Care  policy.     Request from pharmacy:  Requested Prescriptions   Pending Prescriptions Disp Refills    levETIRAcetam (KEPPRA) 500 MG tablet [Pharmacy Med Name: LEVETIRACETAM 500MG TABS] 180 tablet 5     Sig: TAKE THREE TABLETS (1,500MG) BY MOUTH TWICE A DAY       There is no refill protocol information for this order

## 2025-06-11 ENCOUNTER — ALLIED HEALTH/NURSE VISIT (OUTPATIENT)
Dept: NEUROLOGY | Facility: CLINIC | Age: 60
End: 2025-06-11
Payer: COMMERCIAL

## 2025-06-11 DIAGNOSIS — G40.119 INTRACTABLE FOCAL EPILEPSY (H): Primary | ICD-10-CM

## 2025-06-11 NOTE — PROGRESS NOTES
"Surgery Education, Nurse Visit    Mili Mane comes into clinic today at the request of Dario Gillespie MD Ordering Provider for   Patient education about surgery, including indwelling electrodes, ablation, resection, neuromodulation    This service provided today was under the supervising provider of the nelli Rose MD, who was available if needed.    I met with the patient, and her niece, Cierra. Printed handouts and visual aids used as appropriate  Patient History:  Copied from the recent clinic visit with .  \"Onset 3 years old. Focal seizures (auditory hallucination ringing bell) followed by automatisms, amensia, impairment. Bilateral tonic clonic seizures. Refractory to carbamazepine, levetiracetam, lacosamide (max 400 mg, level 7.5), Intolerant VNS standard cycling. Intolerant perampanel. Treated with DVP, PHT, levetiracetam, lamotrigine, OXC, details not available. Overt allergy to methsuximide, phenobarbital. No indication of treatment with GP, PGB, FBM, TGB, ZNS, CNB. VEEG Sep 2024 with three seizures left temporal onset (one of three left posterior temporal) with independent late right temporal discharge; one right temporal dominant seizure with BTC with postictal left temporal seizure; independent bitemporal IEDs. 3 T epilepsy directed MRI without epileptogenic lesion. Depression/anxiety, untreated. \"   Patient already has a VNS Model 106 in place and functioning.    Topics of discussion included overview of:-  [employer] Deep Brain Stimulator for Epilepsy (DBS)  NeuroPace Responsive Neurostimulator System (RNS)  Resective surgery without prior invasive electrode placement  Subdural and depth electrode placement, cortical stimulation, and seizure localization  Laser ablation surgery        We discussed the initial presurgical work up, including some of the tests and how they will be used to help the team decide on the most appropriate recommendations for treatment options based on " the information available.     I explained that options offered would not be offered if it was felt the risks outweighed the benefits, and that all treatments would need to be agreed to by the patient.   We discussed that it may be good to talk with close family or friends about how they see the effect of seizures on the patient's quality of life and what the potential benefits of improvement in seizure control would be, but that it is the patient views that are important.      We discussed an overview of the process by which each of the options would be undertaken, and the general time commitment that would be required on her part.    We discussed some of the potential benefits of each option, and some of the potential negatives to each option.     We discussed that device battery length of service is dependent on the settings used and can be replaced.    I explained that if a device is not helpful, it may be possible to have it removed.      Visual aids included dummy stimulator devices and a model head with an RNS in place    Many appropriate questions were asked during the educations session. Patient was advised to contact us if there were additional questions or concerns

## 2025-06-12 RX ORDER — LEVETIRACETAM 500 MG/1
1500 TABLET ORAL 2 TIMES DAILY
Qty: 180 TABLET | Refills: 1 | Status: SHIPPED | OUTPATIENT
Start: 2025-06-12

## 2025-06-24 ENCOUNTER — TELEPHONE (OUTPATIENT)
Dept: FAMILY MEDICINE | Facility: CLINIC | Age: 60
End: 2025-06-24
Payer: COMMERCIAL

## 2025-06-24 ENCOUNTER — DOCUMENTATION ONLY (OUTPATIENT)
Dept: NEUROLOGY | Facility: CLINIC | Age: 60
End: 2025-06-24

## 2025-06-24 NOTE — PROGRESS NOTES
Patient seen following the PET scan to return the VNS to the prior settings.    [employer] Vagus Nerve Stimulator interrogated. Settings as follows:-  Patient ID ECA, Model , Serial # [id number], Implant date Jun [birth year] 2017.    -------------------    NORMAL SETTINGS :  Output Current 1.25 mA,  Pulse/signal Frequency 20 Hz,  Pulse Width 250 ?sec,  On Time 30 sec,  Off Time 1.8 min,     ------------------    IFI no.    -------------------    MAGNET SETTINGS:  Magnet Output Current 1.5mA,  Magnet Pulse Width 250 ?sec,  Magnet On Time 60 sec.    -------------------    AUTOSTIM SETTINGS:   AutoStim Current 1.25 mA  AutoStim Pulse Width 250 ?sec  AutoStim On Time 30 Sec.    -------------------    CONFIGURATION SETTINGS:  Tachycardia Detection on    Threshold for AutoStim 40%  Heartbeat Detection (sensitivity)  2

## 2025-06-24 NOTE — PROGRESS NOTES
VNS interrogated and adjusted to output settings zero prior to PET scan    [employer] Vagus Nerve Stimulator interrogated. Settings as follows:-  Patient ID ALPHONSO, Model , Serial # [id number], Implant date Jun 2017.    -------------------    NORMAL SETTINGS DAY:  Output Current 1.25 mA, SET TO 0.0mA  Pulse/signal Frequency 20 Hz,  Pulse Width 250 ?sec,  On Time 30 sec,  Off Time 1.8 min,     ------------------    IFI no.    -------------------    MAGNET SETTINGS DAY:  Magnet Output Current 1.5mA, SET TO 0.0mA  Magnet Pulse Width 250 ?sec,  Magnet On Time 60 sec.    -------------------    AUTOSTIM SETTINGS DAY:   AutoStim Current 1.25 mA, SET TO 0.0 mA  AutoStim Pulse Width 250 ?sec  AutoStim On Time 30 Sec.        -------------------    CONFIGURATION SETTINGS:  Tachycardia Detection; on, SET TO 'OFF'    Threshold for AutoStim  40%  Heartbeat Detection (sensitivity) 2    System Diagnostic performed  Communication OK  Output current ok   Current Delivered 1.25 mA  Impedance 2138 Ohms  IFI NO

## 2025-06-24 NOTE — TELEPHONE ENCOUNTER
Patient Quality Outreach    Patient is due for the following:   Hypertension -  Hypertension follow-up visit  Breast Cancer Screening - Mammogram  Cervical Cancer Screening - PAP Needed    Action(s) Taken:   Schedule a office visit for Blood Pressure Check    Type of outreach:    Sent V[employer] message.    Questions for provider review:    None         Gela Irby CMA  Chart routed to None.

## 2025-07-01 ENCOUNTER — TELEPHONE (OUTPATIENT)
Dept: NEUROLOGY | Facility: CLINIC | Age: 60
End: 2025-07-01

## 2025-07-01 NOTE — TELEPHONE ENCOUNTER
Call placed to Cierra and recommendations from Enedelia Boyer PA-C  discussed.  No other questions at this time

## 2025-07-01 NOTE — TELEPHONE ENCOUNTER
What is the concern that needs to be addressed by a nurse? Cierra called in to notify office patient had another seizure and went to ED again yesterday. Sodium levels were off again. Was hoping to discuss the increase in Keppra dosage that she talked with Efe DU about last week. Also set up appointment with Enedelia Boyer for next week.    May a detailed message be left on [employer]? y    Date of last office visit: 6/6/25    Message routed to: Rach LIRA & Enedelia Boyer

## 2025-07-01 NOTE — TELEPHONE ENCOUNTER
Sodium was 130. Not likely to have caused a breakthrough seizure. Keppra was increased further to [id number] in the ED per neurology.     Would ensure she is salting her food. Can consider fluid restriction, no more than 60oz daily if drinking more than this at baseline, which we can review further in follow up.    Enedelia Boyer PA-C

## 2025-07-07 ENCOUNTER — TELEPHONE (OUTPATIENT)
Dept: NEUROLOGY | Facility: CLINIC | Age: 60
End: 2025-07-07

## 2025-07-07 DIAGNOSIS — G40.119 INTRACTABLE FOCAL EPILEPSY (H): ICD-10-CM

## 2025-07-07 NOTE — TELEPHONE ENCOUNTER
What is the concern that needs to be addressed by a nurse?    Patient needing a nurse   Increase of 5 tablets 500 mg twice a day. The pharmacy is not aware of the increase. Please send over new scripts for the KEPPRA. [facility] - [place], [place] - [address]     May a detailed message be left on voicemail?   YES    Pt's phone for return call:  [phone]  Confirmed?  YES    Date of last office visit:     Message routed to: CHRISTIANO: ME MINCEP RN POOL

## 2025-07-07 NOTE — TELEPHONE ENCOUNTER
Hello, this is pharmacy, we are requesting this dose increase to be sent over to [facility], thanks!

## 2025-07-08 RX ORDER — LEVETIRACETAM 500 MG/1
2500 TABLET ORAL 2 TIMES DAILY
Qty: 900 TABLET | Refills: 1 | Status: SHIPPED | OUTPATIENT
Start: 2025-07-08

## 2025-07-10 ENCOUNTER — OFFICE VISIT (OUTPATIENT)
Dept: NEUROLOGY | Facility: CLINIC | Age: 60
End: 2025-07-10
Payer: COMMERCIAL

## 2025-07-10 VITALS
HEIGHT: 62 IN | SYSTOLIC BLOOD PRESSURE: 146 MMHG | WEIGHT: 147 LBS | HEART RATE: 78 BPM | TEMPERATURE: 97.3 F | DIASTOLIC BLOOD PRESSURE: 84 MMHG | BODY MASS INDEX: 27.05 KG/M2

## 2025-07-10 DIAGNOSIS — R45.4 BEHAVIOR-IRRITABILITY: ICD-10-CM

## 2025-07-10 DIAGNOSIS — R51.9 HEADACHE, UNSPECIFIED HEADACHE TYPE: ICD-10-CM

## 2025-07-10 DIAGNOSIS — G40.119 INTRACTABLE FOCAL EPILEPSY (H): Primary | ICD-10-CM

## 2025-07-10 DIAGNOSIS — G40.209 PARTIAL SYMPTOMATIC EPILEPSY WITH COMPLEX PARTIAL SEIZURES, NOT INTRACTABLE, WITHOUT STATUS EPILEPTICUS (H): ICD-10-CM

## 2025-07-10 NOTE — PROGRESS NOTES
[facility] Epilepsy Care Progress Note      Patient:  Mili aMne  :  1965   Age:  60 year old   Today's Office Visit:  7/10/2025  Chief Complaint: Follow up seizures    Epilepsy Data:  Patient History  Primary Epileptologist/Provider: Dario Gillespie M.D.  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  Describes a stereotyped auditory hallucination of a ringing bell.  States that this increases in amplitude and then she loses connection.  Automatic activity is described.  Amnestic during the seizure.  Postictally family reports that she speaks poorly.  # of Type 1 Seizure since last visit: 12    Seizure Type 2: Partial seizures with secondary generalization  -  with simple partial seizures evolving to complex partial seizures evolving to generalized seizures  Description of Sz Type 2: convulsion  # of Type 2 Seizure since last visit: 2       Background History:  Onset 3 years old. Focal seizures (auditory hallucination ringing bell) followed by automatisms, amensia, impairment. Bilateral tonic clonic seizures. Refractory to carbamazepine, levetiracetam, lacosamide (max 400 mg, level 7.5), Intolerant VNS standard cycling. Intolerant perampanel. Treated with DVP, PHT, levetiracetam, lamotrigine,  OXC, details not available. Overt allergy to methsuximide, phenobarbital. No indication of treatment with GP, PGB, FBM, TGB, ZNS, CNB. VEEG Sep 2024 with three seizures left temporal onset (one of three left posterior temporal) with independent late right temporal discharge; one right temporal dominant seizure with BTC with postictal left temporal seizure; independent bitemporal IEDs. 3 T epilepsy directed MRI without epileptogenic lesion. Depression/anxiety, untreated.       History of Present Illness:   Ms. Mane was last seen by Dr. Gillespie on 6/6/2025. At the time of the patient's last visit, she had focal epilepsy with focal aware to focal impaired seizures, and bilateral tonic clonic seizures. She had stereotyped auditory hallucinations prior to many seizures. She remained medically refractory with significant seizure burden, unchanged. She was to increase her carbamazepine to 400-600 and continue levetiracetam 5418-1541. She was to continue her current VNS settings. PET scan was to be completed followed by case management to discuss surgical interventions. VNS rapid cycling, felbatol, pregabalin, gabapentin, tiagabine, zonisamide, and cenobamate could be considered. She was to follow up in 3 months.     In the interim the patient was evaluated in the ED for two breakthrough seizures. The first was 6/20/2025 in the setting of hyponatremia with sodium of 125. Her carbamazepine was reduced back to 400-400, with Keppra increased to 5866-0391. She had another event on 6/30/2025 with a sodium level of 130 with Keppra increased to 6956-6216.    Today, Ms. Mane presents with her niece who contributes to the history.     She is drinking 50 ounces of water or less per day.     She had a simple partial seizure prior to today's visit.    Since the breakthrough events and increasing Keppra, she has had a slight increase in sedation, and is having more headaches, neck pain and irritability.    She has been on  reduced dosing of lorazepam for approximately one year, which she would be open to discontinuing in the near future. With her recent breakthrough events resulting in emergency department visits, I advised her I felt it would be best to wait on this for now.    She states with coming off of medical cannabis she can have tremulousness severe enough to where she would drop medications due to tremors, occurring 3 times, last occurring in February 2025.    She feels good with her current VNS settings. She indicates she remains on carbamazepine 400-600 and is on the increased dosing of levetiracetam of 3052-7130.    Current Outpatient Medications   Medication Sig Dispense Refill    atenolol (TENORMIN) 25 MG tablet Take 25 mg by mouth every evening      atenolol (TENORMIN) 50 MG tablet Take 50 mg by mouth every morning      carBAMazepine (TEGRETOL) 100 MG chewable tablet Take 4 tabs (400mg) at 6am 2/19/2025, and 2 tabs (200 mg) as soon as possible after your procedure. Use instead of Carbatrol 6 tablet 0    CARBATROL 200 MG 12 hr capsule TAKE TWO CAPSULES BY MOUTH EVERY MORNING AND TAKE THREE CAPSULES BY MOUTH EVERY EVENING 450 capsule 3    levETIRAcetam (KEPPRA) 500 MG tablet Take 5 tablets (2,500 mg) by mouth 2 times daily. 900 tablet 1    LORazepam (ATIVAN) 0.5 MG tablet TAKE ONE TABLET BY MOUTH EVERY EVENING 30 tablet 5    Multiple Vitamins-Minerals (CENTRUM SILVER) per tablet Take 1 tablet by mouth daily      medical cannabis (Patient's own supply) See Admin Instructions (The purpose of this order is to document that the patient reports taking medical cannabis.  This is not a prescription, and is not used to certify that the patient has a qualifying medical condition.)          Perceived AED Side Effects:  Uncertain    Medication Notes:  dosing is around 7-8AM and 7PM Keppra, 8PM Carbamazepine, 9PM lorazepam      AED Medication Compliance:  compliant most of the time  Using a pill box:  She sets up her own pill box.  No one is checking it right now, which she is open to with all of the medication changes.    Diagnostic studies reviewed:  6/30/2025  SODIUM  136 - 145 mmol/L 130 Low    POTASSIUM  3.5 - 5.1 mmol/L 4.3   CHLORIDE  98 - 107 mmol/L 95 Low    CO2,TOTAL  22 - 29 mmol/L 25   ANION GAP  5 - 18 10   GLUCOSE  70 - 99 mg/dL 104 High    CALCIUM  8.8 - 10.4 mg/dL 8.9   Comment: Reference ranges for this test were updated on 11/5/2024 to reflect our healthy population more accurately.  Reference range changes are not retroactively applied to results, but previous results using the same methodology can be interpreted in the context of the new reference range.   BUN  8 - 23 mg/dL 14   CREATININE  0.50 - 0.90 mg/dL 0.58   BUN/CREAT RATIO  10 - 20 24 High    eGFR  >90 mL/min/1.73m2 >90     CARBAMAZEPINE  4.0 - 12.0 ug/mL 7.4     LEVETIRACETAM (KEPPRA)  6.0 - 46.0 ug/mL 26     6/20/2025  LEVETIRACETAM (KEPPRA)  6.0 - 46.0 ug/mL 23.9     CARBAMAZEPINE  4.0 - 12.0 ug/mL 8.4     SODIUM  136 - 145 mmol/L 125 Low    POTASSIUM  3.5 - 5.1 mmol/L 4.1   CHLORIDE  98 - 107 mmol/L 93 Low    CO2,TOTAL  22 - 29 mmol/L 22   ANION GAP  5 - 18 10   GLUCOSE  70 - 99 mg/dL 105 High    CALCIUM  8.8 - 10.4 mg/dL 8.6 Low    Comment: Reference ranges for this test were updated on 11/5/2024 to reflect our healthy population more accurately.  Reference range changes are not retroactively applied to results, but previous results using the same methodology can be interpreted in the context of the new reference range.   BUN  8 - 23 mg/dL 12   CREATININE  0.50 - 0.90 mg/dL 0.67   BUN/CREAT RATIO  10 - 20 18   eGFR  >90 mL/min/1.73m2 >90     CT cervical spine 7/1/2025:  FINDINGS:   Age-indeterminate superior endplate T1 and T2 compression deformities, though favored to be chronic. No evidence of acute fracture or subluxation of the cervical spine by CT imaging. The vertebral bodies of the cervical spine have normal stature and alignment. Multilevel degenerative  disc disease of the cervical spine with disc height loss, most pronounced at C4/C5-C6/C7. No extraspinal abnormality.   Craniovertebral junction and C1-C2: Normal.   C2-C3: No posterior disc bulge or spinal canal narrowing. No neural foraminal narrowing.   C3-C4: No posterior disc bulge or spinal canal narrowing. Uncovertebral joint disease and facet arthropathy with mild left neural foraminal narrowing. No right neural foraminal narrowing.   C4-C5: No posterior disc bulge or spinal canal narrowing. No neural foraminal narrowing.   C5-C6: No posterior disc bulge or spinal canal narrowing. No neural foraminal narrowing.   C6-C7: No posterior disc bulge or spinal canal narrowing. No neural foraminal narrowing.   C7-T1: No posterior disc bulge or spinal canal narrowing. No neural foraminal narrowing.     CT head wo 7/1/2025:  Impression    1.  No evidence of acute intracranial hemorrhage or mass effect.    PET brain 6/24/2025:  IMPRESSION:   - Low metabolic activity in bilateral temporal lateral gyri most  pronounced in the right anterior superior temporal gyrus. Hippocampal  formation uptake is within normal limits.   - Additional areas of mildly reduced metabolism (less than -2.0 z  score), as detailed above.     MRI brain 1/16/2025:      Review of Systems:  Lethargy / Tiredness:  Yes  Nausea / Vomiting:  No  Double Vision:  No  Sleepiness:  Yes  Depression:  Increased irritability with increased Keppra dosing  Poor Balance:  No  Dizziness:  No  Appetite Changes:  No  Blurred Vision:  No  Behavioral Changes:  increased irritability  Skin: negative  Respiratory: Shortness of breath with using stairs more once getting to the second floor  Cardiovascular: negative  Have you experienced a traumatic fall since your last visit: YES  Are these falls related to your seizures:  YES: neck pain, no fractures related to fall.       Other Issues:    Is patient safe to drive:  Not driving      Exam:    BP (!) 146/84   Pulse 78    "Temp 97.3  F (36.3  C) (Temporal)   Ht 5' 2\" (157.5 cm)   Wt 147 lb (66.7 kg)   LMP  (LMP Unknown)   BMI 26.89 kg/m       Wt Readings from Last 5 Encounters:   07/10/25 147 lb (66.7 kg)   06/06/25 145 lb 8 oz (66 kg)   10/23/24 147 lb (66.7 kg)   08/06/24 147 lb (66.7 kg)   02/20/24 138 lb 9.6 oz (62.9 kg)       General: General examination reveals a well developed female in no acute distress  Cardiovascular: RRR, no rubs, gallops, or murmurs.     Neurological Examination:    Mental Status: Alert and awake. Mood and affect were appropriate to situation. Memory not formally tested. Language without dysarthria.  Cranial Nerves:  II, III, IV, VI: Undilated fundoscopic exam not completed. Visual fields full to confrontation. PERRL. EOMI without nystagmus, saccadic pursuits.  V: Normal facial sensation and strength of muscles of mastication.  VII: Facial movements asymmetric at rest in V1 distribution, chronic per review of old photos, left eye brow at rest higher than right, with equal active movements.  VIII: Hearing intact to conversation.  IX/X: Palate elevation symmetric.  XI: Sternocleidomastoid and trapezius are normal and without weakness.  XII: Tongue is midline.  Musculoskeletal: Neck supple.  Motor: Strength 5/5 in upper and lower extremities. Pronator drift is negative.  Reflexes:   Intact in upper and lower extremities.   Sensation: Sensation to vibration is intact in upper and lower extremities.   Coordination: Finger-nose-finger testing was normal and without tremor or ataxia. Rapid alternating hand movements and fine finger movements intact. Heel to shin intact bilaterally  Station and Gait: Station was normal based. Gait was normal with good stride, good arm swing and good turning. Negative Romberg.      Assessment and Plan:   Ms. Mane has history of focal epilepsy with focal aware to focal impaired seizures, and bilateral tonic clonic seizures. She has stereotyped auditory hallucinations prior " to many seizures. She remains medically refractory with a partial seizure prior to today's visit, and two tonic clonic seizures resulting in emergency department visits in the interim. She remains on carbamazepine 400-600 and is on increased dosing of levetiracetam [id number], with some sedation, headaches, neck pain, and irritability though all manageable, with the increase in dosing in the last couple of weeks. Her VNS was last evaluated at the time of her PET scan as part of her presurgical evaluation on 6/24/2025. She has subtle facial asymmetry which on review of old pictures is chronic.    At this time, Ms. Mane will continue her current dosing of medications including Keppra 2500mg twice per day and carbamazepine 400mg in the morning and 600mg in the evening. We will check labs for monitoring, including a sodium level with her recent hyponatremia. She will restrict fluids to no more than 50 ounces of water per day. She is working on hypertension management with primary care and would like to avoid adding salt to her diet if possible.     With her headaches, neck pain, fatigue, and irritability, we will have her trial acupuncture. They were very open to this, and eager pursue this nonpharmacologic option.    Ms. Mane will follow up as scheduled with Dr. Gillespie. They were advised to call sooner with questions, concerns, or worsening symptoms.    Thank you for letting me participate in your patient's care.    As described above, I met with the patient for 52 minutes and during this time counseling was greater than 50% of the visit time.    The longitudinal plan of care for the diagnosis(es)/condition(s) as documented were addressed during this visit. Due to the added complexity in care, I will continue to support Mili in the subsequent management and with ongoing continuity of care.

## 2025-07-10 NOTE — LETTER
7/10/2025       RE: Mili Mane  : 1965   MRN: [id number]      Dear Colleague,    Thank you for referring your patient, Mili Mane, to the [facility], [place], operated by [facility] at [facility]. Please see a copy of my visit note below.    [facility] Progress Note      Patient:  Mili Mane  :  1965   Age:  60 year old   Today's Office Visit:  7/10/2025  Chief Complaint: Follow up seizures    Epilepsy Data:  Patient History  Primary Epileptologist/Provider: Dario Gillespie M.D.  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  Describes a stereotyped auditory hallucination of a ringing bell.  States that this increases in amplitude and then she loses connection.  Automatic activity is described.  Amnestic during the seizure.  Postictally family reports that she speaks poorly.  # of Type 1 Seizure since last visit: 12    Seizure Type 2: Partial seizures with secondary generalization  -  with simple partial seizures evolving to complex partial seizures evolving to generalized seizures  Description of Sz Type 2: convulsion  # of Type 2 Seizure since last visit: 2       Background History:  Onset 3 years old. Focal seizures  (auditory hallucination ringing bell) followed by automatisms, amensia, impairment. Bilateral tonic clonic seizures. Refractory to carbamazepine, levetiracetam, lacosamide (max 400 mg, level 7.5), Intolerant VNS standard cycling. Intolerant perampanel. Treated with DVP, PHT, levetiracetam, lamotrigine, OXC, details not available. Overt allergy to methsuximide, phenobarbital. No indication of treatment with GP, PGB, FBM, TGB, ZNS, CNB. VEEG Sep 2024 with three seizures left temporal onset (one of three left posterior temporal) with independent late right temporal discharge; one right temporal dominant seizure with BTC with postictal left temporal seizure; independent bitemporal IEDs. 3 T epilepsy directed MRI without epileptogenic lesion. Depression/anxiety, untreated.       History of Present Illness:   Ms. Mane was last seen by Dr. Gillespie on 6/6/2025. At the time of the patient's last visit, she had focal epilepsy with focal aware to focal impaired seizures, and bilateral tonic clonic seizures. She had stereotyped auditory hallucinations prior to many seizures. She remained medically refractory with significant seizure burden, unchanged. She was to increase her carbamazepine to 400-600 and continue levetiracetam 2193-9250. She was to continue her current VNS settings. PET scan was to be completed followed by case management to discuss surgical interventions. VNS rapid cycling, felbatol, pregabalin, gabapentin, tiagabine, zonisamide, and cenobamate could be considered. She was to follow up in 3 months.     In the interim the patient was evaluated in the ED for two breakthrough seizures. The first was 6/20/2025 in the setting of hyponatremia with sodium of 125. Her carbamazepine was reduced back to 400-400, with Keppra increased to 2261-0972. She had another event on 6/30/2025 with a sodium level of 130 with Keppra increased to 0619-0237.    Today, Ms. Mane presents with her niece who contributes to the  history.     She is drinking 50 ounces of water or less per day.     She had a simple partial seizure prior to today's visit.    Since the breakthrough events and increasing Keppra, she has had a slight increase in sedation, and is having more headaches, neck pain and irritability.    She has been on reduced dosing of lorazepam for approximately one year, which she would be open to discontinuing in the near future. With her recent breakthrough events resulting in emergency department visits, I advised her I felt it would be best to wait on this for now.    She states with coming off of medical cannabis she can have tremulousness severe enough to where she would drop medications due to tremors, occurring 3 times, last occurring in February 2025.    She feels good with her current VNS settings. She indicates she remains on carbamazepine 400-600 and is on the increased dosing of levetiracetam of 6585-7631.    Current Outpatient Medications   Medication Sig Dispense Refill     atenolol (TENORMIN) 25 MG tablet Take 25 mg by mouth every evening       atenolol (TENORMIN) 50 MG tablet Take 50 mg by mouth every morning       carBAMazepine (TEGRETOL) 100 MG chewable tablet Take 4 tabs (400mg) at 6am 2/19/2025, and 2 tabs (200 mg) as soon as possible after your procedure. Use instead of Carbatrol 6 tablet 0     CARBATROL 200 MG 12 hr capsule TAKE TWO CAPSULES BY MOUTH EVERY MORNING AND TAKE THREE CAPSULES BY MOUTH EVERY EVENING 450 capsule 3     levETIRAcetam (KEPPRA) 500 MG tablet Take 5 tablets (2,500 mg) by mouth 2 times daily. 900 tablet 1     LORazepam (ATIVAN) 0.5 MG tablet TAKE ONE TABLET BY MOUTH EVERY EVENING 30 tablet 5     Multiple Vitamins-Minerals (CENTRUM SILVER) per tablet Take 1 tablet by mouth daily       medical cannabis (Patient's own supply) See Admin Instructions (The purpose of this order is to document that the patient reports taking medical cannabis.  This is not a prescription, and is not used to  certify that the patient has a qualifying medical condition.)          Perceived AED Side Effects:  Uncertain    Medication Notes:  dosing is around 7-8AM and 7PM Keppra, 8PM Carbamazepine, 9PM lorazepam      AED Medication Compliance:  compliant most of the time  Using a pill box:  She sets up her own pill box. No one is checking it right now, which she is open to with all of the medication changes.    Diagnostic studies reviewed:  6/30/2025  SODIUM  136 - 145 mmol/L 130 Low    POTASSIUM  3.5 - 5.1 mmol/L 4.3   CHLORIDE  98 - 107 mmol/L 95 Low    CO2,TOTAL  22 - 29 mmol/L 25   ANION GAP  5 - 18 10   GLUCOSE  70 - 99 mg/dL 104 High    CALCIUM  8.8 - 10.4 mg/dL 8.9   Comment: Reference ranges for this test were updated on 11/5/2024 to reflect our healthy population more accurately.  Reference range changes are not retroactively applied to results, but previous results using the same methodology can be interpreted in the context of the new reference range.   BUN  8 - 23 mg/dL 14   CREATININE  0.50 - 0.90 mg/dL 0.58   BUN/CREAT RATIO  10 - 20 24 High    eGFR  >90 mL/min/1.73m2 >90     CARBAMAZEPINE  4.0 - 12.0 ug/mL 7.4     LEVETIRACETAM (KEPPRA)  6.0 - 46.0 ug/mL 26     6/20/2025  LEVETIRACETAM (KEPPRA)  6.0 - 46.0 ug/mL 23.9     CARBAMAZEPINE  4.0 - 12.0 ug/mL 8.4     SODIUM  136 - 145 mmol/L 125 Low    POTASSIUM  3.5 - 5.1 mmol/L 4.1   CHLORIDE  98 - 107 mmol/L 93 Low    CO2,TOTAL  22 - 29 mmol/L 22   ANION GAP  5 - 18 10   GLUCOSE  70 - 99 mg/dL 105 High    CALCIUM  8.8 - 10.4 mg/dL 8.6 Low    Comment: Reference ranges for this test were updated on 11/5/2024 to reflect our healthy population more accurately.  Reference range changes are not retroactively applied to results, but previous results using the same methodology can be interpreted in the context of the new reference range.   BUN  8 - 23 mg/dL 12   CREATININE  0.50 - 0.90 mg/dL 0.67   BUN/CREAT RATIO  10 - 20 18   eGFR  >90 mL/min/1.73m2 >90     CT cervical  spine 7/1/2025:  FINDINGS:   Age-indeterminate superior endplate T1 and T2 compression deformities, though favored to be chronic. No evidence of acute fracture or subluxation of the cervical spine by CT imaging. The vertebral bodies of the cervical spine have normal stature and alignment. Multilevel degenerative disc disease of the cervical spine with disc height loss, most pronounced at C4/C5-C6/C7. No extraspinal abnormality.   Craniovertebral junction and C1-C2: Normal.   C2-C3: No posterior disc bulge or spinal canal narrowing. No neural foraminal narrowing.   C3-C4: No posterior disc bulge or spinal canal narrowing. Uncovertebral joint disease and facet arthropathy with mild left neural foraminal narrowing. No right neural foraminal narrowing.   C4-C5: No posterior disc bulge or spinal canal narrowing. No neural foraminal narrowing.   C5-C6: No posterior disc bulge or spinal canal narrowing. No neural foraminal narrowing.   C6-C7: No posterior disc bulge or spinal canal narrowing. No neural foraminal narrowing.   C7-T1: No posterior disc bulge or spinal canal narrowing. No neural foraminal narrowing.     CT head wo 7/1/2025:  Impression    1.  No evidence of acute intracranial hemorrhage or mass effect.    PET brain 6/24/2025:  IMPRESSION:   - Low metabolic activity in bilateral temporal lateral gyri most  pronounced in the right anterior superior temporal gyrus. Hippocampal  formation uptake is within normal limits.   - Additional areas of mildly reduced metabolism (less than -2.0 z  score), as detailed above.     MRI brain 1/16/2025:      Review of Systems:  Lethargy / Tiredness:  Yes  Nausea / Vomiting:  No  Double Vision:  No  Sleepiness:  Yes  Depression:  Increased irritability with increased Keppra dosing  Poor Balance:  No  Dizziness:  No  Appetite Changes:  No  Blurred Vision:  No  Behavioral Changes:  increased irritability  Skin: negative  Respiratory: Shortness of breath with using stairs more once  "getting to the second floor  Cardiovascular: negative  Have you experienced a traumatic fall since your last visit: YES  Are these falls related to your seizures:  YES: neck pain, no fractures related to fall.       Other Issues:    Is patient safe to drive:  Not driving      Exam:    BP (!) 146/84   Pulse 78   Temp 97.3  F (36.3  C) (Temporal)   Ht 5' 2\" (157.5 cm)   Wt 147 lb (66.7 kg)   LMP  (LMP Unknown)   BMI 26.89 kg/m       Wt Readings from Last 5 Encounters:   07/10/25 147 lb (66.7 kg)   06/06/25 145 lb 8 oz (66 kg)   10/23/24 147 lb (66.7 kg)   08/06/24 147 lb (66.7 kg)   02/20/24 138 lb 9.6 oz (62.9 kg)       General: General examination reveals a well developed female in no acute distress  Cardiovascular: RRR, no rubs, gallops, or murmurs.     Neurological Examination:    Mental Status: Alert and awake. Mood and affect were appropriate to situation. Memory not formally tested. Language without dysarthria.  Cranial Nerves:  II, III, IV, VI: Undilated fundoscopic exam not completed. Visual fields full to confrontation. PERRL. EOMI without nystagmus, saccadic pursuits.  V: Normal facial sensation and strength of muscles of mastication.  VII: Facial movements asymmetric at rest in V1 distribution, chronic per review of old photos, left eye brow at rest higher than right, with equal active movements.  VIII: Hearing intact to conversation.  IX/X: Palate elevation symmetric.  XI: Sternocleidomastoid and trapezius are normal and without weakness.  XII: Tongue is midline.  Musculoskeletal: Neck supple.  Motor: Strength 5/5 in upper and lower extremities. Pronator drift is negative.  Reflexes:   Intact in upper and lower extremities.   Sensation: Sensation to vibration is intact in upper and lower extremities.   Coordination: Finger-nose-finger testing was normal and without tremor or ataxia. Rapid alternating hand movements and fine finger movements intact. Heel to shin intact bilaterally  Station and Gait: " Station was normal based. Gait was normal with good stride, good arm swing and good turning. Negative Romberg.      Assessment and Plan:   Ms. Mane has history of focal epilepsy with focal aware to focal impaired seizures, and bilateral tonic clonic seizures. She has stereotyped auditory hallucinations prior to many seizures. She remains medically refractory with a partial seizure prior to today's visit, and two tonic clonic seizures resulting in emergency department visits in the interim. She remains on carbamazepine 400-600 and is on increased dosing of levetiracetam 7712-2045, with some sedation, headaches, neck pain, and irritability though all manageable, with the increase in dosing in the last couple of weeks. Her VNS was last evaluated at the time of her PET scan as part of her presurgical evaluation on 6/24/2025. She has subtle facial asymmetry which on review of old pictures is chronic.    At this time, Ms. Mane will continue her current dosing of medications including Keppra 2500mg twice per day and carbamazepine 400mg in the morning and 600mg in the evening. We will check labs for monitoring, including a sodium level with her recent hyponatremia. She will restrict fluids to no more than 50 ounces of water per day. She is working on hypertension management with primary care and would like to avoid adding salt to her diet if possible.     With her headaches, neck pain, fatigue, and irritability, we will have her trial acupuncture. They were very open to this, and eager pursue this nonpharmacologic option.    Ms. Mane will follow up as scheduled with Dr. Gillespie. They were advised to call sooner with questions, concerns, or worsening symptoms.    Thank you for letting me participate in your patient's care.    As described above, I met with the patient for 52 minutes and during this time counseling was greater than 50% of the visit time.    The longitudinal plan of care for the  diagnosis(es)/condition(s) as documented were addressed during this visit. Due to the added complexity in care, I will continue to support Mili in the subsequent management and with ongoing continuity of care.                      Again, thank you for allowing me to participate in the care of your patient.      Sincerely,    Enedelia Boyer PA-C

## 2025-07-10 NOTE — PATIENT INSTRUCTIONS
Continue current dosing of medications at this time including Keppra 2500mg twice per day (5 500mg tabs twice per day) and carbamazepine 400mg in the morning and 600mg in the evening.    We will check labs for monitoring     We will have you start acupuncutre with Oswaldo for headaches, fatigue, irritability    Follow up as scheduled. We will call with further direction after case management. Call sooner with questions, concerns, or worsening symptoms.

## 2025-07-11 LAB
ANION GAP SERPL CALCULATED.3IONS-SCNC: 12 MMOL/L (ref 7–15)
BUN SERPL-MCNC: 22.7 MG/DL (ref 8–23)
CALCIUM SERPL-MCNC: 9.2 MG/DL (ref 8.8–10.4)
CHLORIDE SERPL-SCNC: 101 MMOL/L (ref 98–107)
CREAT SERPL-MCNC: 0.66 MG/DL (ref 0.51–0.95)
EGFRCR SERPLBLD CKD-EPI 2021: >90 ML/MIN/1.73M2
GLUCOSE SERPL-MCNC: 100 MG/DL (ref 70–99)
HCO3 SERPL-SCNC: 25 MMOL/L (ref 22–29)
LEVETIRACETAM SERPL-MCNC: 35.1 ÂΜG/ML (ref 10–40)
POTASSIUM SERPL-SCNC: 4.6 MMOL/L (ref 3.4–5.3)
SODIUM SERPL-SCNC: 138 MMOL/L (ref 135–145)

## 2025-07-15 LAB
CARBAMAZEPINE EP SERPL-MCNC: 3.5 UG/ML
CARBAMAZEPINE SERPL-MCNC: 8.4 UG/ML

## 2025-07-22 ENCOUNTER — OFFICE VISIT (OUTPATIENT)
Dept: PSYCHIATRY | Facility: CLINIC | Age: 60
End: 2025-07-22
Payer: COMMERCIAL

## 2025-07-22 DIAGNOSIS — M54.51 VERTEBROGENIC LOW BACK PAIN: Primary | ICD-10-CM

## 2025-07-22 DIAGNOSIS — F43.21 ADJUSTMENT DISORDER WITH DEPRESSED MOOD: ICD-10-CM

## 2025-07-22 DIAGNOSIS — G40.919 INTRACTABLE EPILEPSY (H): ICD-10-CM

## 2025-07-22 DIAGNOSIS — M54.9 BACK PAIN: ICD-10-CM

## 2025-07-22 DIAGNOSIS — M54.2 NECK PAIN: ICD-10-CM

## 2025-07-23 NOTE — PROGRESS NOTES
Mili Mane, is a 60 year old female is here today for acupuncture treatment for main complaint of Back Pain and Neck Pain      Secondary complaint(s): depression, anxiety, seizures    Patient is referred by: Self    Session 1 (2025)    Subjective: Patient endorses history of chronic low back pain with origin at L4-S1.  This recurrent pain began years ago, imaging in the past diagnosed the origin at L4, L5, S1.  Patient reports pain is recurrent and has not identified a pattern to aggravation or recurrence.  She reports that when pain occurs it can be dull and achey soreness, usually rated 4-6/10.  She denies sciatica. She denies pain specifically in the low back today, but reports recent aggravation.  She endorses chronic pain in the neck at the posterior aspect.  This pain is sore and sometimes sharp.  It is aggravated by specific activities, movements, and increased tension.  Pain in the neck today is rated 5/10.  She also reports history of left shoulder pain for the past few years.  She denies pain here currently.        Energy: Low  Emotions: Yes: Anxiety, Anger/Irritability, Depression, Loss of Interest, and Worry/Over Thinking, has VNS implanted device  Limbs/Back: Refer to Notes  Head/Eyes/Ears: NA  Digestion: no current symptoms  Stools: No Current Symptoms  Palpitation: NA  Women's Health: post menopause      Past Medical History:   has a past medical history of Adjustment disorder with depressed mood, Epileptic seizure (H), and HTN (hypertension).      Objective:  General: Well appearing, well nourished, in no distress. Oriented X3, intact recent and remote memory, judgment and insight, normal mood and affect.    Pulse: Thin choppy       Eastern Medical Diagnosis Pertinent to Treatment:  Yin Deficiency, Qi Stagnation, Blood Stagnation, and Kidney Deficiency    Treatment Principle: relieve pain, strengthen KI, move qi and blood      Points Used Today:    DU 20, BL 5, GB 20, SJ 16, an royal, FA n/s,  4G, KI 3    At 15 minutes, needles inserted and stimulated to de'qi, and retained for 15 minutes.  At 30 minutes needles are reinserted or stimulated as needed and retained additional 15 minutes, then removed at 45 minutes.  CNT followed.  DBC 0.18 x 15 and 0.18 x 30 needles    Total needles inserted: 17 Total needles removed: 17      Accessory Technique's:   TDP Heat Lamp: On Feet    Additional Treatment Notes:  Pulses checked and balanced at needle insertion, at 30 minutes, and at removal.  TDP temperature checked at 30 minutes and at removal.        Acupuncture Treatment Recommendations and Comments:   It is my recommendation that patient seek advice from their PCP about active symptoms not addressed during this visit.      Plan:   Patient agrees to treatment plan: acu and supportive treatment Q 1-2 weeks until symptoms and patterns managed, then as indicated for health maintenance.      Time Spent with Patient:        I spent 32 minutes face-to-face with Mili Mane during today's office visit for acupuncture.      Nessa De Leon LAc

## 2025-07-28 ENCOUNTER — PATIENT OUTREACH (OUTPATIENT)
Dept: CARE COORDINATION | Facility: CLINIC | Age: 60
End: 2025-07-28
Payer: COMMERCIAL

## 2025-07-29 ENCOUNTER — OFFICE VISIT (OUTPATIENT)
Dept: PSYCHIATRY | Facility: CLINIC | Age: 60
End: 2025-07-29
Payer: COMMERCIAL

## 2025-07-29 DIAGNOSIS — M54.2 NECK PAIN: ICD-10-CM

## 2025-07-29 DIAGNOSIS — M54.50 CHRONIC BILATERAL LOW BACK PAIN WITHOUT SCIATICA: ICD-10-CM

## 2025-07-29 DIAGNOSIS — F43.21 ADJUSTMENT DISORDER WITH DEPRESSED MOOD: ICD-10-CM

## 2025-07-29 DIAGNOSIS — G40.919 INTRACTABLE EPILEPSY (H): ICD-10-CM

## 2025-07-29 DIAGNOSIS — M54.51 VERTEBROGENIC LOW BACK PAIN: Primary | ICD-10-CM

## 2025-07-29 DIAGNOSIS — G89.29 CHRONIC BILATERAL LOW BACK PAIN WITHOUT SCIATICA: ICD-10-CM

## 2025-07-29 NOTE — PROGRESS NOTES
Mili Mane, is a 60 year old female is here today for a Follow Up acupuncture treatment for main complaint of Back Pain and Neck Pain      Secondary complaint(s): depression, anxiety, seizures    Patient is referred by: Self    Session 2 (2025)    Subjective: Patient reports significant improvement in chronic pain following initial acupuncture treatment.  She reports improvement in mobility in the back and neck.  Pain today is rated 3/10, and subtly increased yesterday.    Past Medical History:   has a past medical history of Adjustment disorder with depressed mood, Epileptic seizure (H), and HTN (hypertension).      Objective:  General: Well appearing, well nourished, in no distress. Oriented X3, intact recent and remote memory, judgment and insight, normal mood and affect.    Pulse: Thin choppy         Eastern Medical Diagnosis Pertinent to Treatment:  Yin Deficiency, Qi Stagnation, Blood Stagnation, and Kidney Deficiency     Treatment Principle: relieve pain, strengthen KI, move qi and blood      Points Used Today:    4G, DU 20, 24, YT, Esm, buddha triangle L, KI 3, GB 40, SJ 16, an royal, FA n/s, BL 5    At 15 minutes, needles inserted and stimulated to de'qi, and retained for 15 minutes.  At 30 minutes needles are reinserted or stimulated as needed and retained additional 15 minutes, then removed at 45 minutes.  CNT followed.  DBC 0.18 x 15 and 0.18 x 30 needles    Total needles inserted: 24 Total needles removed: 24      Accessory Technique's:   TDP Heat Lamp: On Feet    Additional Treatment Notes:  Pulses checked and balanced at needle insertion, at 30 minutes, and at removal.  TDP temperature checked at 30 minutes and at removal.        Acupuncture Treatment Recommendations and Comments:   It is my recommendation that patient seek advice from their PCP about active symptoms not addressed during this visit.      Plan:   Patient agrees to treatment plan: acu and supportive treatment Q 1-2 weeks until  symptoms and patterns managed, then as indicated for health maintenance.      Time Spent with Patient:        I spent 32 minutes face-to-face with Mili Mane during today's office visit for acupuncture.      Nessa De Leon LAc

## 2025-08-12 ENCOUNTER — OFFICE VISIT (OUTPATIENT)
Dept: PSYCHIATRY | Facility: CLINIC | Age: 60
End: 2025-08-12
Payer: COMMERCIAL

## 2025-08-12 DIAGNOSIS — G40.919 INTRACTABLE EPILEPSY (H): ICD-10-CM

## 2025-08-12 DIAGNOSIS — M54.50 CHRONIC BILATERAL LOW BACK PAIN WITHOUT SCIATICA: ICD-10-CM

## 2025-08-12 DIAGNOSIS — M54.51 VERTEBROGENIC LOW BACK PAIN: Primary | ICD-10-CM

## 2025-08-12 DIAGNOSIS — G89.29 CHRONIC BILATERAL LOW BACK PAIN WITHOUT SCIATICA: ICD-10-CM

## 2025-08-12 DIAGNOSIS — M54.2 NECK PAIN: ICD-10-CM

## 2025-08-19 ENCOUNTER — OFFICE VISIT (OUTPATIENT)
Dept: PSYCHIATRY | Facility: CLINIC | Age: 60
End: 2025-08-19
Payer: COMMERCIAL

## 2025-08-19 DIAGNOSIS — M54.51 VERTEBROGENIC LOW BACK PAIN: Primary | ICD-10-CM

## 2025-08-19 DIAGNOSIS — G89.29 CHRONIC BILATERAL LOW BACK PAIN WITHOUT SCIATICA: ICD-10-CM

## 2025-08-19 DIAGNOSIS — F43.21 ADJUSTMENT DISORDER WITH DEPRESSED MOOD: ICD-10-CM

## 2025-08-19 DIAGNOSIS — M54.50 CHRONIC BILATERAL LOW BACK PAIN WITHOUT SCIATICA: ICD-10-CM

## 2025-08-19 DIAGNOSIS — M54.2 NECK PAIN: ICD-10-CM

## 2025-08-19 DIAGNOSIS — G40.919 INTRACTABLE EPILEPSY (H): ICD-10-CM

## 2025-08-19 ASSESSMENT — PATIENT HEALTH QUESTIONNAIRE - PHQ9
SUM OF ALL RESPONSES TO PHQ QUESTIONS 1-9: 8
10. IF YOU CHECKED OFF ANY PROBLEMS, HOW DIFFICULT HAVE THESE PROBLEMS MADE IT FOR YOU TO DO YOUR WORK, TAKE CARE OF THINGS AT HOME, OR GET ALONG WITH OTHER PEOPLE: SOMEWHAT DIFFICULT
SUM OF ALL RESPONSES TO PHQ QUESTIONS 1-9: 8

## (undated) DEVICE — PREP CHLORAPREP CLEAR 3ML 260400

## (undated) DEVICE — SU VICRYL 3-0 SH 27" J316H

## (undated) DEVICE — ESU ELEC BLADE 2.75" COATED/INSULATED E1455

## (undated) DEVICE — SOL ADH LIQUID BENZOIN SWAB 0.6ML C1544

## (undated) DEVICE — DECANTER VIAL 2006S

## (undated) DEVICE — SOL NACL 0.9% IRRIG 1000ML BOTTLE 2F7124

## (undated) DEVICE — SU ETHILON 3-0 PS-1 18" 1663H

## (undated) DEVICE — DRAPE SHEET REV FOLD 3/4 9349

## (undated) DEVICE — SU SILK 2-0 TIE 12X30" A305H

## (undated) DEVICE — PREP POVIDONE IODINE SOLUTION 10% 120ML

## (undated) DEVICE — PREP SKIN SCRUB TRAY 4461A

## (undated) DEVICE — PACK NEURO MINOR UMMC SNE32MNMU4

## (undated) DEVICE — SU MONOCRYL 4-0 PS-2 27" UND Y426H

## (undated) DEVICE — COVER CAMERA VIDEO 5X96" 29-59009

## (undated) DEVICE — SU VICRYL 3-0 SH 8X18" UND J864D

## (undated) DEVICE — DRAPE MAYO STAND 23X54 8337

## (undated) DEVICE — LINEN TOWEL PACK X6 WHITE 5487

## (undated) DEVICE — DRSG STERI STRIP 1/2X4" R1547

## (undated) DEVICE — BLADE KNIFE SURG 11 371111

## (undated) DEVICE — DRAPE IOBAN INCISE 36X23" 6651EZ

## (undated) DEVICE — PREP POVIDONE IODINE SCRUB 7.5% 120ML

## (undated) DEVICE — PAD CHUX UNDERPAD 23X24" 7136

## (undated) DEVICE — PREP DURAPREP 26ML APL 8630

## (undated) DEVICE — DRSG PRIMAPORE 03 1/8X6" 66000318

## (undated) DEVICE — SOL WATER IRRIG 1000ML BOTTLE 2F7114

## (undated) DEVICE — LINEN TOWEL PACK X5 5464

## (undated) DEVICE — SU ETHIBOND 2-0 SHDA 30" X563H

## (undated) RX ORDER — BACITRACIN 50000 [IU]/1
INJECTION, POWDER, FOR SOLUTION INTRAMUSCULAR
Status: DISPENSED
Start: 2017-06-20

## (undated) RX ORDER — PROPOFOL 10 MG/ML
INJECTION, EMULSION INTRAVENOUS
Status: DISPENSED
Start: 2017-06-20

## (undated) RX ORDER — METOPROLOL TARTRATE 50 MG
TABLET ORAL
Status: DISPENSED
Start: 2017-06-15

## (undated) RX ORDER — ESMOLOL HYDROCHLORIDE 10 MG/ML
INJECTION INTRAVENOUS
Status: DISPENSED
Start: 2017-06-20

## (undated) RX ORDER — METOPROLOL TARTRATE 1 MG/ML
INJECTION, SOLUTION INTRAVENOUS
Status: DISPENSED
Start: 2017-06-15

## (undated) RX ORDER — PHENYLEPHRINE HCL IN 0.9% NACL 1 MG/10 ML
SYRINGE (ML) INTRAVENOUS
Status: DISPENSED
Start: 2017-06-20

## (undated) RX ORDER — FENTANYL CITRATE 50 UG/ML
INJECTION, SOLUTION INTRAMUSCULAR; INTRAVENOUS
Status: DISPENSED
Start: 2017-06-20

## (undated) RX ORDER — ONDANSETRON 2 MG/ML
INJECTION INTRAMUSCULAR; INTRAVENOUS
Status: DISPENSED
Start: 2017-06-20

## (undated) RX ORDER — EPHEDRINE SULFATE 50 MG/ML
INJECTION, SOLUTION INTRAMUSCULAR; INTRAVENOUS; SUBCUTANEOUS
Status: DISPENSED
Start: 2017-06-20

## (undated) RX ORDER — DEXAMETHASONE SODIUM PHOSPHATE 4 MG/ML
INJECTION, SOLUTION INTRA-ARTICULAR; INTRALESIONAL; INTRAMUSCULAR; INTRAVENOUS; SOFT TISSUE
Status: DISPENSED
Start: 2017-06-20

## (undated) RX ORDER — GLYCOPYRROLATE 0.2 MG/ML
INJECTION, SOLUTION INTRAMUSCULAR; INTRAVENOUS
Status: DISPENSED
Start: 2017-06-20

## (undated) RX ORDER — LIDOCAINE HYDROCHLORIDE AND EPINEPHRINE 10; 10 MG/ML; UG/ML
INJECTION, SOLUTION INFILTRATION; PERINEURAL
Status: DISPENSED
Start: 2017-06-20

## (undated) RX ORDER — LIDOCAINE HYDROCHLORIDE 20 MG/ML
INJECTION, SOLUTION EPIDURAL; INFILTRATION; INTRACAUDAL; PERINEURAL
Status: DISPENSED
Start: 2017-06-20